# Patient Record
Sex: FEMALE | Race: WHITE | NOT HISPANIC OR LATINO | Employment: FULL TIME | URBAN - METROPOLITAN AREA
[De-identification: names, ages, dates, MRNs, and addresses within clinical notes are randomized per-mention and may not be internally consistent; named-entity substitution may affect disease eponyms.]

---

## 2017-10-10 ENCOUNTER — CLINICAL SUPPORT (OUTPATIENT)
Dept: OTHER | Facility: CLINIC | Age: 37
End: 2017-10-10
Payer: COMMERCIAL

## 2017-10-10 DIAGNOSIS — Z00.8 HEALTH EXAMINATION IN POPULATION SURVEYS: Primary | ICD-10-CM

## 2017-10-10 PROCEDURE — 82947 ASSAY GLUCOSE BLOOD QUANT: CPT | Mod: QW,S$GLB,, | Performed by: INTERNAL MEDICINE

## 2017-10-10 PROCEDURE — 99401 PREV MED CNSL INDIV APPRX 15: CPT | Mod: S$GLB,,, | Performed by: INTERNAL MEDICINE

## 2017-10-10 PROCEDURE — 80061 LIPID PANEL: CPT | Mod: QW,S$GLB,, | Performed by: INTERNAL MEDICINE

## 2017-10-11 VITALS
HEIGHT: 64 IN | WEIGHT: 193 LBS | BODY MASS INDEX: 32.95 KG/M2 | SYSTOLIC BLOOD PRESSURE: 136 MMHG | DIASTOLIC BLOOD PRESSURE: 92 MMHG

## 2017-10-11 LAB
GLUCOSE SERPL-MCNC: ABNORMAL MG/DL (ref 60–140)
POC CHOLESTEROL, HDL: 39 MG/DL (ref 40–?)
POC CHOLESTEROL, LDL: 124 MG/DL (ref ?–160)
POC CHOLESTEROL, TOTAL: 228 MG/DL (ref ?–240)
POC GLUCOSE FASTING: 189 MG/DL (ref 60–110)
POC TOTAL CHOLESTEROL / HDL RATIO: 5.85 (ref ?–6)
POC TRIGLYCERIDES: 325 MG/DL (ref ?–160)

## 2018-11-27 ENCOUNTER — OFFICE VISIT (OUTPATIENT)
Dept: OTOLARYNGOLOGY | Facility: CLINIC | Age: 38
End: 2018-11-27
Payer: COMMERCIAL

## 2018-11-27 VITALS
WEIGHT: 194.88 LBS | HEART RATE: 72 BPM | TEMPERATURE: 98 F | SYSTOLIC BLOOD PRESSURE: 132 MMHG | DIASTOLIC BLOOD PRESSURE: 76 MMHG | BODY MASS INDEX: 33.45 KG/M2

## 2018-11-27 DIAGNOSIS — H60.391 OTHER INFECTIVE ACUTE OTITIS EXTERNA OF RIGHT EAR: Primary | ICD-10-CM

## 2018-11-27 DIAGNOSIS — H72.93 TYMPANIC MEMBRANE PERFORATION, BILATERAL: ICD-10-CM

## 2018-11-27 DIAGNOSIS — H91.93 BILATERAL HEARING LOSS, UNSPECIFIED HEARING LOSS TYPE: ICD-10-CM

## 2018-11-27 PROCEDURE — 99999 PR PBB SHADOW E&M-EST. PATIENT-LVL III: CPT | Mod: PBBFAC,,, | Performed by: ORTHOPAEDIC SURGERY

## 2018-11-27 PROCEDURE — 3008F BODY MASS INDEX DOCD: CPT | Mod: CPTII,S$GLB,, | Performed by: ORTHOPAEDIC SURGERY

## 2018-11-27 PROCEDURE — 99204 OFFICE O/P NEW MOD 45 MIN: CPT | Mod: S$GLB,,, | Performed by: ORTHOPAEDIC SURGERY

## 2018-11-27 RX ORDER — CEFDINIR 300 MG/1
300 CAPSULE ORAL 2 TIMES DAILY
Qty: 20 CAPSULE | Refills: 0 | Status: SHIPPED | OUTPATIENT
Start: 2018-11-27 | End: 2018-12-07

## 2018-11-27 RX ORDER — FLUTICASONE PROPIONATE 50 MCG
2 SPRAY, SUSPENSION (ML) NASAL DAILY
COMMUNITY
End: 2018-12-27

## 2018-11-28 NOTE — PROGRESS NOTES
Subjective:       Patient ID: Duane Romero is a 38 y.o. female.    Chief Complaint: Frequent ear infections    Patient is a very pleasant 38-year-old female here to see me today for the 1st time for evaluation of her ears.  She has a long and complicated otologic history, and required 10 sets of ear tubes as a child.  She says that she eventually had a retained ear tube as well as bilateral perforations, the retained tube was removed at about 17-18 years of age.  They attempted repair of her left tympanic membrane perforation around 1997 with a postauricular approach.  However, that was not successful, and she has not had any further attempts at repair.  She has also followed with Dr. Lora at [facility] and Dr. Cortez, her last audiogram was approximately a year ago.  She also had a CT scan done at an outside facility several years ago, she does not have those reports with her now.  Currently, she has had drainage from her right ear since Sunday.  She has not been using any ear drops, and says that in fact she has not used any ear drops since childhood she had an episode where triggered severe ear pain.  Occasionally, she does have pain in both ears.  She does have hearing loss at her baseline in both ears, and has not noted any worsening of her hearing.  She denies any dizziness.  She does have tinnitus at her baseline, it has not been lower recently.  She also has a history of significant allergies, and is currently using Flonase daily.  She has had allergy testing in the past, and did reveal reactions to multiple different aeroallergens.      Review of Systems   Constitutional: Negative for chills, fatigue, fever and unexpected weight change.   HENT: Positive for congestion, ear discharge, ear pain, hearing loss and tinnitus. Negative for dental problem, facial swelling, nosebleeds, postnasal drip, rhinorrhea, sinus pressure, sneezing, sore throat, trouble swallowing and voice change.    Eyes: Negative for  redness, itching and visual disturbance.   Respiratory: Negative for cough, choking, shortness of breath and wheezing.    Cardiovascular: Negative for chest pain and palpitations.   Gastrointestinal: Negative for abdominal pain.        No reflux.   Musculoskeletal: Negative for gait problem.   Skin: Negative for rash.   Allergic/Immunologic: Positive for environmental allergies.   Neurological: Negative for dizziness, light-headedness and headaches.       Objective:      Physical Exam   Constitutional: She is oriented to person, place, and time. She appears well-developed and well-nourished. No distress.   HENT:   Head: Normocephalic and atraumatic.   Right Ear: Tympanic membrane, external ear and ear canal normal. There is drainage, swelling and tenderness.   Left Ear: External ear and ear canal normal. Tympanic membrane is perforated.   Nose: Nose normal. No mucosal edema, rhinorrhea, nasal deformity or septal deviation. No epistaxis. Right sinus exhibits no maxillary sinus tenderness and no frontal sinus tenderness. Left sinus exhibits no maxillary sinus tenderness and no frontal sinus tenderness.   Mouth/Throat: Uvula is midline, oropharynx is clear and moist and mucous membranes are normal. Mucous membranes are not pale and not dry. No dental caries. No oropharyngeal exudate or posterior oropharyngeal erythema.   Diffuse edema of the right EAC, very swollen and erythematous, unable to see the TM due to edema of EAC   Eyes: Conjunctivae, EOM and lids are normal. Pupils are equal, round, and reactive to light. Right eye exhibits no chemosis. Left eye exhibits no chemosis. Right conjunctiva is not injected. Left conjunctiva is not injected. No scleral icterus. Right eye exhibits normal extraocular motion and no nystagmus. Left eye exhibits normal extraocular motion and no nystagmus.   Neck: Trachea normal and phonation normal. No tracheal tenderness present. No tracheal deviation present. No thyroid mass and no  thyromegaly present.   Cardiovascular: Intact distal pulses.   Pulmonary/Chest: Effort normal. No stridor. No respiratory distress.   Abdominal: She exhibits no distension.   Lymphadenopathy:        Head (right side): No submental, no submandibular, no preauricular, no posterior auricular and no occipital adenopathy present.        Head (left side): No submental, no submandibular, no preauricular, no posterior auricular and no occipital adenopathy present.     She has no cervical adenopathy.   Neurological: She is alert and oriented to person, place, and time. No cranial nerve deficit.   Skin: Skin is warm and dry. No rash noted. No erythema.   Psychiatric: She has a normal mood and affect. Her behavior is normal.       Assessment:       1. Other infective acute otitis externa of right ear    2. Tympanic membrane perforation, bilateral    3. Bilateral hearing loss, unspecified hearing loss type        Plan:       1.  OE right ear:  She has a significant OE of the right ear, but cannot generally tolerate topical ear drops.  Will start on Floxin ear drops to her right ear.  Will also start on Omnicef due to difficulty with drops.   2.  TM perforation bilaterally:  Has been stable for some time, she is not interested in repair at this time.  Discussed that we do have otology available here if needed.  Will have her sign release for us to obtain her previous records from Dr. Cortez as well as [facility] regarding her CT temporal bones.  She will try and bring a disc to her return visit so we can upload to our system.  3.  Bilateral hearing loss:  I would recommend a repeat audiogram at time of return visit, obtain old audiograms as above.

## 2018-11-29 ENCOUNTER — TELEPHONE (OUTPATIENT)
Dept: OTOLARYNGOLOGY | Facility: CLINIC | Age: 38
End: 2018-11-29

## 2018-11-29 NOTE — TELEPHONE ENCOUNTER
Called and spoke with patient who was at work.  She has noted gradual improvement today but says that ear seems to be popping more today.  She is on Flonase.  She has not used ear drops today.  Continues on oral antibiotics.  Offered her an appointment to see me today to assess whether otowick placement is needed.  She declined and says she'll resume drops and call us back with any worsening symptoms.

## 2018-11-29 NOTE — TELEPHONE ENCOUNTER
Patient saw Dr. Garcia on 11/27 (Tuesday) for right sided ear infection.  Dr. Garcia placed her on Omnicef.  Patient had some Floxin drops that Dr. Garcia told her was ok to use.  She is in horrible pain in the right ear and would like to know what to do.  Can you help?  I told her Dr. Garcia was not in today.  Thanks.

## 2018-11-29 NOTE — TELEPHONE ENCOUNTER
----- Message from Liz Curran sent at 11/29/2018  7:01 AM CST -----  Contact: pt   Call pt regarding a bad ear infection and the ear drops are making it worst and need to know what to do.     [phone] (home)

## 2018-11-30 ENCOUNTER — TELEPHONE (OUTPATIENT)
Dept: OTOLARYNGOLOGY | Facility: CLINIC | Age: 38
End: 2018-11-30

## 2018-11-30 NOTE — TELEPHONE ENCOUNTER
Faxed over a release of information to [facility]. Confirmation received to both locations.

## 2018-12-19 ENCOUNTER — CLINICAL SUPPORT (OUTPATIENT)
Dept: AUDIOLOGY | Facility: CLINIC | Age: 38
End: 2018-12-19
Payer: COMMERCIAL

## 2018-12-19 ENCOUNTER — TELEPHONE (OUTPATIENT)
Dept: OTOLARYNGOLOGY | Facility: CLINIC | Age: 38
End: 2018-12-19

## 2018-12-19 ENCOUNTER — OFFICE VISIT (OUTPATIENT)
Dept: OTOLARYNGOLOGY | Facility: CLINIC | Age: 38
End: 2018-12-19
Payer: COMMERCIAL

## 2018-12-19 VITALS
DIASTOLIC BLOOD PRESSURE: 79 MMHG | TEMPERATURE: 97 F | WEIGHT: 194.88 LBS | BODY MASS INDEX: 33.27 KG/M2 | HEIGHT: 64 IN | SYSTOLIC BLOOD PRESSURE: 122 MMHG

## 2018-12-19 DIAGNOSIS — H72.93 TYMPANIC MEMBRANE PERFORATION, BILATERAL: ICD-10-CM

## 2018-12-19 DIAGNOSIS — H90.0 CONDUCTIVE HEARING LOSS, BILATERAL: ICD-10-CM

## 2018-12-19 DIAGNOSIS — H60.503 ACUTE OTITIS EXTERNA OF BOTH EARS, UNSPECIFIED TYPE: ICD-10-CM

## 2018-12-19 DIAGNOSIS — H72.93 TYMPANIC MEMBRANE PERFORATION, BILATERAL: Primary | ICD-10-CM

## 2018-12-19 DIAGNOSIS — H90.0 CONDUCTIVE HEARING LOSS OF BOTH EARS: Primary | ICD-10-CM

## 2018-12-19 PROCEDURE — 99999 PR PBB SHADOW E&M-EST. PATIENT-LVL III: CPT | Mod: PBBFAC,,, | Performed by: ORTHOPAEDIC SURGERY

## 2018-12-19 PROCEDURE — 92567 TYMPANOMETRY: CPT | Mod: S$GLB,,, | Performed by: AUDIOLOGIST-HEARING AID FITTER

## 2018-12-19 PROCEDURE — 92553 AUDIOMETRY AIR & BONE: CPT | Mod: S$GLB,,, | Performed by: AUDIOLOGIST-HEARING AID FITTER

## 2018-12-19 PROCEDURE — 99214 OFFICE O/P EST MOD 30 MIN: CPT | Mod: S$GLB,,, | Performed by: ORTHOPAEDIC SURGERY

## 2018-12-19 PROCEDURE — 3008F BODY MASS INDEX DOCD: CPT | Mod: CPTII,S$GLB,, | Performed by: ORTHOPAEDIC SURGERY

## 2018-12-19 PROCEDURE — 92555 SPEECH THRESHOLD AUDIOMETRY: CPT | Mod: S$GLB,,, | Performed by: AUDIOLOGIST-HEARING AID FITTER

## 2018-12-19 NOTE — PROGRESS NOTES
Subjective:       Patient ID: Duane Romero is a 38 y.o. female.    Chief Complaint: Follow-up    Patient is a very pleasant 38-year-old female here to see me today in followup for evaluation of her ears.  She has a long and complicated otologic history, and required 10 sets of ear tubes as a child.  She says that she eventually had a retained ear tube as well as bilateral perforations, the retained tube was removed at about 17-18 years of age.  They attempted repair of her left tympanic membrane perforation around 1997 with a postauricular approach.  However, that was not successful, and she has not had any further attempts at repair.  She has also followed with Dr. Lora at [facility] and Dr. Cortez, her last audiogram was approximately a year ago and then had one today.  She also had a CT scan done at an outside facility several years ago, she does not have those reports with her now.  Occasionally, she does have pain in both ears.  She does have hearing loss at her baseline in both ears, and has not noted any worsening of her hearing.  She denies any dizziness.  She does have tinnitus at her baseline.   She also has a history of significant allergies, and is currently using Flonase daily.  She has had allergy testing in the past, and did reveal reactions to multiple different aeroallergens.  Since her last visit, the pain and drainage has resolved her right ear.  She has been on Floxin ear drops as well as oral Omnicef.  She feels that her hearing is worse now than it was prior to her ear infection.      Review of Systems   Constitutional: Negative for chills, fatigue, fever and unexpected weight change.   HENT: Positive for congestion, hearing loss and tinnitus. Negative for dental problem, ear discharge, ear pain, facial swelling, nosebleeds, postnasal drip, rhinorrhea, sinus pressure, sneezing, sore throat, trouble swallowing and voice change.    Eyes: Negative for redness, itching and visual disturbance.    Respiratory: Negative for cough, choking, shortness of breath and wheezing.    Cardiovascular: Negative for chest pain and palpitations.   Gastrointestinal: Negative for abdominal pain.        No reflux.   Musculoskeletal: Negative for gait problem.   Skin: Negative for rash.   Allergic/Immunologic: Positive for environmental allergies.   Neurological: Negative for dizziness, light-headedness and headaches.       Objective:      Physical Exam   Constitutional: She is oriented to person, place, and time. She appears well-developed and well-nourished. No distress.   HENT:   Head: Normocephalic and atraumatic.   Right Ear: External ear and ear canal normal. No drainage, swelling or tenderness. Tympanic membrane is perforated (small, anterior inferior quadrant).   Left Ear: External ear and ear canal normal. Tympanic membrane is perforated (large, anterior).   Nose: Nose normal. No mucosal edema, rhinorrhea, nasal deformity or septal deviation. No epistaxis. Right sinus exhibits no maxillary sinus tenderness and no frontal sinus tenderness. Left sinus exhibits no maxillary sinus tenderness and no frontal sinus tenderness.   Mouth/Throat: Uvula is midline, oropharynx is clear and moist and mucous membranes are normal. Mucous membranes are not pale and not dry. No dental caries. No oropharyngeal exudate or posterior oropharyngeal erythema.   Diffuse edema of the right EAC, very swollen and erythematous, unable to see the TM due to edema of EAC   Eyes: Conjunctivae, EOM and lids are normal. Pupils are equal, round, and reactive to light. Right eye exhibits no chemosis. Left eye exhibits no chemosis. Right conjunctiva is not injected. Left conjunctiva is not injected. No scleral icterus. Right eye exhibits normal extraocular motion and no nystagmus. Left eye exhibits normal extraocular motion and no nystagmus.   Neck: Trachea normal and phonation normal. No tracheal tenderness present. No tracheal deviation present. No  thyroid mass and no thyromegaly present.   Cardiovascular: Intact distal pulses.   Pulmonary/Chest: Effort normal. No stridor. No respiratory distress.   Abdominal: She exhibits no distension.   Lymphadenopathy:        Head (right side): No submental, no submandibular, no preauricular, no posterior auricular and no occipital adenopathy present.        Head (left side): No submental, no submandibular, no preauricular, no posterior auricular and no occipital adenopathy present.     She has no cervical adenopathy.   Neurological: She is alert and oriented to person, place, and time. No cranial nerve deficit.   Skin: Skin is warm and dry. No rash noted. No erythema.   Psychiatric: She has a normal mood and affect. Her behavior is normal.       Audiology:  Bilateral conductive hearing loss with type B tympanograms      Assessment:       1. Tympanic membrane perforation, bilateral    2. Conductive hearing loss, bilateral        Plan:       1.  Bilateral tympanic membrane perforations:  Reviewed her audiogram together in detail.  Unfortunately, we have not yet received her outside records so I cannot tell her if her hearing has in fact worsened in her ear since her ear infection.  However, we did discussed different options available to her at this point.  We discussed that she is a good candidate for a BAHA if she is interested.  She did have very good questions regarding this procedure, I would like to meet with audiology for demonstration.  We also discussed that she would be a candidate for possible middle ear exploration and ossicular chain reconstruction.  She had previously discussed this with Dr. Lora, but was concerned regarding complications, which is certainly reasonable.  She would like to meet with Dr. Kelley to discuss all of her options.  Will schedule visit with audiology and Dr. Kelley for consultation and BAHA demonstration.   She will bring her records with her to that visit.  2.  Bilateral conductive  hearing loss:  As above.

## 2018-12-27 ENCOUNTER — OFFICE VISIT (OUTPATIENT)
Dept: OTOLARYNGOLOGY | Facility: CLINIC | Age: 38
End: 2018-12-27
Payer: COMMERCIAL

## 2018-12-27 VITALS — WEIGHT: 193.13 LBS | BODY MASS INDEX: 32.97 KG/M2 | HEIGHT: 64 IN

## 2018-12-27 DIAGNOSIS — H92.02 LEFT EAR PAIN: ICD-10-CM

## 2018-12-27 DIAGNOSIS — H90.0 CONDUCTIVE HEARING LOSS, BILATERAL: ICD-10-CM

## 2018-12-27 DIAGNOSIS — H72.93 TYMPANIC MEMBRANE PERFORATION, BILATERAL: Primary | ICD-10-CM

## 2018-12-27 PROCEDURE — 99999 PR PBB SHADOW E&M-EST. PATIENT-LVL II: CPT | Mod: PBBFAC,,, | Performed by: PHYSICIAN ASSISTANT

## 2018-12-27 PROCEDURE — 99213 OFFICE O/P EST LOW 20 MIN: CPT | Mod: S$GLB,,, | Performed by: PHYSICIAN ASSISTANT

## 2018-12-27 PROCEDURE — 3008F BODY MASS INDEX DOCD: CPT | Mod: CPTII,S$GLB,, | Performed by: PHYSICIAN ASSISTANT

## 2018-12-27 RX ORDER — OFLOXACIN 3 MG/ML
3 SOLUTION AURICULAR (OTIC) 2 TIMES DAILY
Qty: 5 ML | Refills: 0 | Status: SHIPPED | OUTPATIENT
Start: 2018-12-27 | End: 2019-01-06

## 2018-12-27 RX ORDER — FLUTICASONE PROPIONATE 50 MCG
2 SPRAY, SUSPENSION (ML) NASAL DAILY
Qty: 1 BOTTLE | Refills: 12 | Status: SHIPPED | OUTPATIENT
Start: 2018-12-27 | End: 2019-01-31

## 2018-12-27 NOTE — PROGRESS NOTES
Subjective:       Patient ID: Duane Romero is a 38 y.o. female.    Chief Complaint: Ear Drainage (right) and Otalgia (left)    Patient is a very pleasant 38-year-old female here to see me today for the first time in followup for her ears.  She was last here on 12/19/18 with Dr. Garcia.  She reports left ear pain 2 nights ago; seems better today.  She's noticed her ears are popping.  No left ear drainage but says her right ear continues to drain occasionally since her last visit (< 2 weeks ago) but denies right otalgia.  She had just completed Floxin ear drops as well as oral Omnicef at her last visit.  No recent fever.  No recent change in her hearing.  She also has a history of significant allergies.  She has had allergy testing in the past, and did reveal reactions to multiple different aeroallergens.  She has been out of Flonase for past 7-10 days.    She has a long and complicated otologic history, and required 10 sets of ear tubes as a child.  She says that she eventually had a retained ear tube as well as bilateral perforations.  The retained tube was removed at about 17-18 years of age.  They attempted repair of her left tympanic membrane perforation around 1997 with a postauricular approach.  However, that was not successful, and she has not had any further attempts at repair.  She has also followed with Dr. Lora at [facility] and Dr. Cortez.  She had an audiogram at her last visit.  She also had a CT scan done at an outside facility several years ago, she does not have those reports with her now.  Occasionally, she does have pain in both ears.  She does have hearing loss at her baseline in both ears, and has not noted any worsening of her hearing.  She denies any dizziness.  She does have tinnitus at her baseline.        Review of Systems   Constitutional: Negative for chills, fatigue, fever and unexpected weight change.   HENT: Positive for congestion, hearing loss and tinnitus. Negative for dental  problem, ear discharge, ear pain, facial swelling, nosebleeds, postnasal drip, rhinorrhea, sinus pressure, sneezing, sore throat, trouble swallowing and voice change.    Eyes: Negative for redness, itching and visual disturbance.   Respiratory: Negative for cough, choking, shortness of breath and wheezing.    Cardiovascular: Negative for chest pain and palpitations.   Gastrointestinal: Negative for abdominal pain.        No reflux.   Musculoskeletal: Negative for gait problem.   Skin: Negative for rash.   Allergic/Immunologic: Positive for environmental allergies.   Neurological: Negative for dizziness, light-headedness and headaches.       Objective:      Physical Exam   Constitutional: She is oriented to person, place, and time. She appears well-developed and well-nourished. She is cooperative. No distress.   HENT:   Head: Normocephalic and atraumatic.   Right Ear: External ear and ear canal normal. No drainage, swelling (minimal cracked skin near EAC meatus) or tenderness. Tympanic membrane is scarred. No middle ear effusion.   Left Ear: External ear and ear canal normal. No drainage, swelling or tenderness. Tympanic membrane is perforated (dry middle ear mucosa).   Nose: Nose normal. No mucosal edema, rhinorrhea, nasal deformity or septal deviation. No epistaxis. Right sinus exhibits no maxillary sinus tenderness and no frontal sinus tenderness. Left sinus exhibits no maxillary sinus tenderness and no frontal sinus tenderness.   Mouth/Throat: Uvula is midline, oropharynx is clear and moist and mucous membranes are normal. Mucous membranes are not pale and not dry. No trismus in the jaw. No uvula swelling or dental caries. No oropharyngeal exudate or posterior oropharyngeal erythema.   Eyes: Conjunctivae, EOM and lids are normal. Pupils are equal, round, and reactive to light. Right eye exhibits no chemosis. Left eye exhibits no chemosis. Right conjunctiva is not injected. Left conjunctiva is not injected. No  scleral icterus. Right eye exhibits normal extraocular motion and no nystagmus. Left eye exhibits normal extraocular motion and no nystagmus.   Neck: Trachea normal and phonation normal. No tracheal tenderness present. No tracheal deviation present. No thyroid mass and no thyromegaly present.   Cardiovascular: Intact distal pulses.   Pulmonary/Chest: Effort normal. No stridor. No respiratory distress.   Abdominal: She exhibits no distension.   Lymphadenopathy:        Head (right side): No submental, no submandibular, no preauricular, no posterior auricular and no occipital adenopathy present.        Head (left side): No submental, no submandibular, no preauricular, no posterior auricular and no occipital adenopathy present.     She has no cervical adenopathy.   Neurological: She is alert and oriented to person, place, and time. No cranial nerve deficit.   Skin: Skin is warm and dry. No rash noted. No erythema.   Psychiatric: She has a normal mood and affect. Her behavior is normal.       Assessment:       1. Tympanic membrane perforation, bilateral    2. Conductive hearing loss, bilateral    3. Left ear pain        Plan:         Discussed with patient that she has a large LEFT TM perforation but I see no signs of infection or drainage to culture today.  I recommend she resume Flonase.  Instructed her to RTC with any worsening symptoms.  As for her bilateral tympanic membrane perforations and CHL AU, I agree with referral to Dr. Kelley to discuss treatment options (BAHA if she is interested; candidate for possible middle ear exploration and ossicular chain reconstruction).  She had previously discussed this with Dr. Lora, but was concerned regarding complications, which is certainly reasonable.  She is already scheduled to meet with Dr. Kelley to discuss all of her options (visit with audiology and Dr. Kelley for consultation and BAHA demonstration).   She will bring her records with her to that visit.

## 2019-01-31 ENCOUNTER — OFFICE VISIT (OUTPATIENT)
Dept: OTOLARYNGOLOGY | Facility: CLINIC | Age: 39
End: 2019-01-31
Payer: COMMERCIAL

## 2019-01-31 ENCOUNTER — CLINICAL SUPPORT (OUTPATIENT)
Dept: AUDIOLOGY | Facility: CLINIC | Age: 39
End: 2019-01-31
Payer: COMMERCIAL

## 2019-01-31 VITALS
WEIGHT: 191.81 LBS | BODY MASS INDEX: 33.44 KG/M2 | HEART RATE: 64 BPM | SYSTOLIC BLOOD PRESSURE: 119 MMHG | DIASTOLIC BLOOD PRESSURE: 74 MMHG

## 2019-01-31 DIAGNOSIS — Z71.89 HEARING AID CONSULTATION: Primary | ICD-10-CM

## 2019-01-31 DIAGNOSIS — H90.0 CONDUCTIVE HEARING LOSS, BILATERAL: ICD-10-CM

## 2019-01-31 DIAGNOSIS — H72.93 TYMPANIC MEMBRANE PERFORATION, BILATERAL: Primary | ICD-10-CM

## 2019-01-31 PROCEDURE — 3008F BODY MASS INDEX DOCD: CPT | Mod: CPTII,S$GLB,, | Performed by: OTOLARYNGOLOGY

## 2019-01-31 PROCEDURE — 99999 PR PBB SHADOW E&M-EST. PATIENT-LVL II: CPT | Mod: PBBFAC,,, | Performed by: OTOLARYNGOLOGY

## 2019-01-31 PROCEDURE — 3008F PR BODY MASS INDEX (BMI) DOCUMENTED: ICD-10-PCS | Mod: CPTII,S$GLB,, | Performed by: OTOLARYNGOLOGY

## 2019-01-31 PROCEDURE — 99213 PR OFFICE/OUTPT VISIT, EST, LEVL III, 20-29 MIN: ICD-10-PCS | Mod: S$GLB,,, | Performed by: OTOLARYNGOLOGY

## 2019-01-31 PROCEDURE — 99213 OFFICE O/P EST LOW 20 MIN: CPT | Mod: S$GLB,,, | Performed by: OTOLARYNGOLOGY

## 2019-01-31 PROCEDURE — 99999 PR PBB SHADOW E&M-EST. PATIENT-LVL II: ICD-10-PCS | Mod: PBBFAC,,, | Performed by: OTOLARYNGOLOGY

## 2019-01-31 NOTE — PROGRESS NOTES
Duane Romero was seen 01/31/2019 for a Bone Anchored Hearing Aid evaluation to determine candidacy.  Patient was tested in the sound pires in the sound field using pulsed FM signal to obtain aided audiogram with BAHA programmed for her LEFT side.     Her word recognition score with BAHA demo was 100% at 55dBHL and 100% at a + 5 S/N ratio.    Patient subjectively reported noticeable benefit with the BAHA demo for speech clarity, quality and lateralization.  She would like to proceed with the BAHA surgery bilaterally pending medical clearance and insurance coverage.  The Audiological BAHA purchase agreement was discussed and fitting/programming fee agreed.    The following may be ordered:  BAHA 5 (Connect vs. Attract TBD with ENT) bilateral in blonde  Mini ojhn and TV streamer    Patient will follow-up with  for review and consult.    Recommendations include:    1.  ENT followup  2.  Wear hearing protective devices around loud noise  3.  Annual audiograms

## 2019-01-31 NOTE — LETTER
February 1, 2019      Brittany Garcia MD  [address] [place] [address] [address] [place] [id number]           [facility]  [address] [facility]  [place] [address]  Phone: [phone]  Fax: [phone]          Patient: Duane Romero   MR Number: [id number]   YOB: 1980   Date of Visit: 1/31/2019       Dear Dr. Brittany Garcia:    Thank you for referring Duane Romero to me for evaluation. Attached you will find relevant portions of my assessment and plan of care.    If you have questions, please do not hesitate to call me. I look forward to following Duane Romero along with you.    Sincerely,    Jeff Kelley MD    Enclosure  CC:  No Recipients    If you would like to receive this communication electronically, please contact [email or web address] or [phone] to request more information on [employer] access.    For providers and/or their staff who would like to refer a patient to [facility], please contact us through our one-stop-shop provider referral line, [facility], at [phone].    If you feel you have received this communication in error or would no longer like to receive these types of communications, please e-mail [email or web address]

## 2019-02-01 NOTE — PROGRESS NOTES
Subjective:       Patient ID: Duane Romero is a 38 y.o. female.    Chief Complaint: Hearing Loss    HPI     Duane Romero is a 38 y.o. female referred by Dr. Brittany Garcia for bilateral tympanic membrane perforations and conductive hearing loss.  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  Has had bilateral tympanoplasty with persistent perforation.  Has associated moderate constant bilateral hearing loss. Denies otorrhea currently, but states get ear infections a couple times a year.    Review of Systems   Constitutional: Negative for chills and fever.   HENT: Negative for sore throat and trouble swallowing.    Respiratory: Negative for apnea and chest tightness.    Cardiovascular: Negative for chest pain.       Objective:      Physical Exam   Constitutional: She appears well-developed and well-nourished.   HENT:   Head: Normocephalic and atraumatic.   Right Ear: External ear and ear canal normal. Tympanic membrane is perforated.   Left Ear: External ear and ear canal normal. Tympanic membrane is perforated.   Ears:    Nose: Nose normal.   Mouth/Throat: Uvula is midline, oropharynx is clear and moist and mucous membranes are normal.   Neck: Normal range of motion. No thyroid mass present.       Data:      Audiogram tracings independently reviewed and discussed with patient shows bilateral CHL with very large ABG  Assessment:       1. Tympanic membrane perforation, bilateral    2. Conductive hearing loss, bilateral          This is a 38 yr old with history of CSOM.  Currently she has bilateral anterior perforations and severe CHL.  Hearing rehabilation options include tympanoplasty with possible OCR, conventional HA or a BAHA.  We had an extensive discussion on the pros and cons of each option.  Patient is more interested in BAHA at this time, however she would prefer to have surgery in [place] if it can be done here.  Plan:    Patient to call to schedule surgery   If patient opts for  DEBRA she would like it to be done in [place].  I explained that this is fine, but she would need to schedule with Dr. Arellano at this time, as I am not operating in [place] currently.

## 2019-02-01 NOTE — PROGRESS NOTES
Referring Provider:Dr. Radha Zepeda Heather was seen 12/19/2018 for an audiological evaluation.  Patient was seen for a 1 month follow-up today due to acute otitis externa and bilateral tympanic membrane perforations. She has a long and complicated otologic history, and required 10 sets of ear tubes as a child.  She says that she eventually had a retained ear tube as well as bilateral perforations, the retained tube was removed at about 17-16 years of age.  They attempted repair of her left tympanic membrane perforation around 1997 with a postauricular approach.  However, that was not successful, and she has not had any further attempts at repair.  She has also followed with Dr. Lora at [facility] and Dr. Cortez, her last audiogram was approximately a year ago.    Results reveal a moderate rising to mild conductive hearing loss 250-8000 Hz for the right ear, and a moderately severe rising to mild conductive hearing loss 250-8000 Hz for the left ear.   Speech Reception Thresholds were  40 dBHL for the right ear and 50 dBHL for the left ear.   Tympanograms were Type B large volume, abnormal for the right ear and Type B, abnormal for the left ear.    Patient was counseled on the above findings.    Recommendations:  1. ENT          
5

## 2019-10-23 ENCOUNTER — PATIENT OUTREACH (OUTPATIENT)
Dept: ADMINISTRATIVE | Facility: HOSPITAL | Age: 39
End: 2019-10-23

## 2019-11-20 ENCOUNTER — OFFICE VISIT (OUTPATIENT)
Dept: INTERNAL MEDICINE | Facility: CLINIC | Age: 39
End: 2019-11-20
Payer: COMMERCIAL

## 2019-11-20 VITALS
SYSTOLIC BLOOD PRESSURE: 138 MMHG | TEMPERATURE: 98 F | BODY MASS INDEX: 32.16 KG/M2 | OXYGEN SATURATION: 98 % | DIASTOLIC BLOOD PRESSURE: 66 MMHG | HEIGHT: 64 IN | WEIGHT: 188.38 LBS | HEART RATE: 84 BPM

## 2019-11-20 DIAGNOSIS — E66.9 OBESITY (BMI 30.0-34.9): ICD-10-CM

## 2019-11-20 DIAGNOSIS — Z76.89 ENCOUNTER TO ESTABLISH CARE: Primary | ICD-10-CM

## 2019-11-20 DIAGNOSIS — R00.2 HEART PALPITATIONS: ICD-10-CM

## 2019-11-20 PROBLEM — E66.811 OBESITY (BMI 30.0-34.9): Status: ACTIVE | Noted: 2019-11-20

## 2019-11-20 PROCEDURE — 3008F BODY MASS INDEX DOCD: CPT | Mod: CPTII,S$GLB,, | Performed by: INTERNAL MEDICINE

## 2019-11-20 PROCEDURE — 3008F PR BODY MASS INDEX (BMI) DOCUMENTED: ICD-10-PCS | Mod: CPTII,S$GLB,, | Performed by: INTERNAL MEDICINE

## 2019-11-20 PROCEDURE — 99204 OFFICE O/P NEW MOD 45 MIN: CPT | Mod: S$GLB,,, | Performed by: INTERNAL MEDICINE

## 2019-11-20 PROCEDURE — 99204 PR OFFICE/OUTPT VISIT, NEW, LEVL IV, 45-59 MIN: ICD-10-PCS | Mod: S$GLB,,, | Performed by: INTERNAL MEDICINE

## 2019-11-20 PROCEDURE — 99999 PR PBB SHADOW E&M-EST. PATIENT-LVL III: ICD-10-PCS | Mod: PBBFAC,,, | Performed by: INTERNAL MEDICINE

## 2019-11-20 PROCEDURE — 99999 PR PBB SHADOW E&M-EST. PATIENT-LVL III: CPT | Mod: PBBFAC,,, | Performed by: INTERNAL MEDICINE

## 2019-11-20 NOTE — PROGRESS NOTES
Subjective:      Patient ID: Duane Romero is a 39 y.o. female.    Chief Complaint: [facility]      MsFidelina Romero is a patient of Peter House MD, who presents to [facility] primary care.    HPI    She reports wanting to lose weight.     She reports feeling episodes of 1 heart palpitation 0-3 times per day over the past 2-3 weeks, which causes her to take a deep breath. No chest pain or shortness of breath or dizziness or headache or blurry vision or nausea. She endorses having diaphoresis on some nights, which she attributes to early menopause or with working out. She endorses increased work-related stress.       Past Medical History:   Diagnosis Date    Allergy     Otitis media     Prediabetes     Sinusitis      Past Surgical History:   Procedure Laterality Date    ADENOIDECTOMY       SECTION      CYST REMOVAL Right     Under right arm    DILATION AND CURETTAGE OF UTERUS      TONSILLECTOMY      TYMPANOPLASTY      TYMPANOSTOMY TUBE PLACEMENT       Social History     Socioeconomic History    Marital status:      Spouse name: Not on file    Number of children: Not on file    Years of education: Not on file    Highest education level: Not on file   Occupational History    Not on file   Social Needs    Financial resource strain: Not on file    Food insecurity:     Worry: Not on file     Inability: Not on file    Transportation needs:     Medical: Not on file     Non-medical: Not on file   Tobacco Use    Smoking status: Never Smoker    Smokeless tobacco: Never Used   Substance and Sexual Activity    Alcohol use: Yes    Drug use: Not on file    Sexual activity: Not on file   Lifestyle    Physical activity:     Days per week: Not on file     Minutes per session: Not on file    Stress: Not on file   Relationships    Social connections:     Talks on phone: Not on file     Gets together: Not on file     Attends Taoism service: Not on file     Active  "member of club or organization: Not on file     Attends meetings of clubs or organizations: Not on file     Relationship status: Not on file   Other Topics Concern    Not on file   Social History Narrative    Patient goal(s): Weight of 130 lbs    Motivation: Stay healthy (prevent dz/need for rx) to enjoy outdoor activities with family or by herself    Breakfast: 1-2 sausages & 1-2 eggs with 1 slice bread; water & coffee w/ Stevia & sugar-free creamer    Lunch: Boneless chicken wings & 1 thin slice of cheesecake or 1 turkey sandwich on white bread, 1 slice of American cheese; water    Dinner: Hamburger (90:10 or 80:20 meat) with cheese & bread or chicken; water or wine 5.5 oz    Snacks: Apples or 1-2 small bag of chips daily (160 jamaal)    Eating out: 3-4x/wk    Water (oz/day): 102 oz/d    Physical Activity: 35-50 min/d of resistance/aerobic & 4-5d/wk     Strategies: Lose it radha (smaller portions; lower carb)     Goals    None       Family History   Problem Relation Age of Onset    Cancer Maternal Aunt         Skin cancer    Thyroid disease Maternal Grandmother     Hyperlipidemia Mother     Diabetes Father         DM2 insulin pump & oral    Hypertension Father     Obesity Father     Migraines Neg Hx     Asthma Neg Hx        Current Outpatient Medications:     multivitamin,tx-minerals (VITAMINS AND MINERALS) Tab, Take by mouth once daily., Disp: , Rfl:     Review of patient's allergies indicates:   Allergen Reactions    Pcn [penicillins] Itching     Injection only.  Can tolerate oral form       Review of Systems   All remaining systems negative    Objective:     /66 (BP Location: Right arm, Patient Position: Sitting)   Pulse 84   Temp 98.3 °F (36.8 °C)   Ht 5' 3.5" (1.613 m)   Wt 85.5 kg (188 lb 6.1 oz)   LMP 10/11/2019   SpO2 98%   BMI 32.85 kg/m²     Physical Exam  GEN: A&O fully, NAD  PSYC: Normal affect  CV: RRR, no M/G/R  PULM: CTA bilaterally, no wheezes, rales, crackles   EXT: No C/C/E, " normal DP pulses bilaterally      LABS:  Lab Results   Component Value Date    CHOL 228 10/11/2017       Assessment:      1. Encounter to establish care    2. Obesity (BMI 30.0-34.9): I encouraged the following permanent lifestyle modifications, particularly gradually and systematically increasing physical activity (5-90 min/episode, 3-5 times/week), water (1/2 of body weight in ounces) and avoiding potatoes, sugar sweetened beverages, red/processed meats (including chicken), fast food, grains (rice/bread/pasta); and increasing yogurt, whole fruits, unsalted nuts to 3-5 servings/day, and daily weight logging; non-starchy vegetables, cooked beans, and un-fried seafood have weak effects on weight.        Plan:   Encounter to establish care  -     CBC auto differential; Future; Expected date: 11/20/2019  -     Comprehensive metabolic panel; Future; Expected date: 11/20/2019  -     Lipid panel; Future; Expected date: 11/20/2019  -     Vitamin D; Future; Expected date: 11/20/2019    Obesity (BMI 30.0-34.9)      Follow up in about 3 months (around 2/20/2020), or if symptoms worsen or fail to improve, for FU on pre-diabetes.      I spent >45 minutes of time with patient 50% or more of which was discussing labs and plans of care.

## 2019-11-22 DIAGNOSIS — Z12.39 BREAST CANCER SCREENING: ICD-10-CM

## 2019-11-25 DIAGNOSIS — Z76.89 ESTABLISHING CARE WITH NEW DOCTOR, ENCOUNTER FOR: Primary | ICD-10-CM

## 2019-11-26 ENCOUNTER — OFFICE VISIT (OUTPATIENT)
Dept: CARDIOLOGY | Facility: CLINIC | Age: 39
End: 2019-11-26
Payer: COMMERCIAL

## 2019-11-26 ENCOUNTER — CLINICAL SUPPORT (OUTPATIENT)
Dept: CARDIOLOGY | Facility: CLINIC | Age: 39
End: 2019-11-26
Payer: COMMERCIAL

## 2019-11-26 VITALS
WEIGHT: 185 LBS | SYSTOLIC BLOOD PRESSURE: 120 MMHG | DIASTOLIC BLOOD PRESSURE: 70 MMHG | HEIGHT: 63 IN | BODY MASS INDEX: 32.78 KG/M2 | HEART RATE: 74 BPM

## 2019-11-26 DIAGNOSIS — Z76.89 ESTABLISHING CARE WITH NEW DOCTOR, ENCOUNTER FOR: ICD-10-CM

## 2019-11-26 DIAGNOSIS — R06.09 DOE (DYSPNEA ON EXERTION): ICD-10-CM

## 2019-11-26 DIAGNOSIS — E88.810 METABOLIC SYNDROME: ICD-10-CM

## 2019-11-26 DIAGNOSIS — R00.2 PALPITATIONS: ICD-10-CM

## 2019-11-26 DIAGNOSIS — E88.819 INSULIN RESISTANCE: ICD-10-CM

## 2019-11-26 PROCEDURE — 93010 EKG 12-LEAD: ICD-10-PCS | Mod: S$GLB,,, | Performed by: INTERNAL MEDICINE

## 2019-11-26 PROCEDURE — 99999 PR PBB SHADOW E&M-EST. PATIENT-LVL III: CPT | Mod: PBBFAC,,, | Performed by: NUCLEAR MEDICINE

## 2019-11-26 PROCEDURE — 3008F PR BODY MASS INDEX (BMI) DOCUMENTED: ICD-10-PCS | Mod: CPTII,S$GLB,, | Performed by: NUCLEAR MEDICINE

## 2019-11-26 PROCEDURE — 3008F BODY MASS INDEX DOCD: CPT | Mod: CPTII,S$GLB,, | Performed by: NUCLEAR MEDICINE

## 2019-11-26 PROCEDURE — 93005 ELECTROCARDIOGRAM TRACING: CPT | Mod: S$GLB,,, | Performed by: NUCLEAR MEDICINE

## 2019-11-26 PROCEDURE — 99204 PR OFFICE/OUTPT VISIT, NEW, LEVL IV, 45-59 MIN: ICD-10-PCS | Mod: S$GLB,,, | Performed by: NUCLEAR MEDICINE

## 2019-11-26 PROCEDURE — 99999 PR PBB SHADOW E&M-EST. PATIENT-LVL III: ICD-10-PCS | Mod: PBBFAC,,, | Performed by: NUCLEAR MEDICINE

## 2019-11-26 PROCEDURE — 99204 OFFICE O/P NEW MOD 45 MIN: CPT | Mod: S$GLB,,, | Performed by: NUCLEAR MEDICINE

## 2019-11-26 PROCEDURE — 93005 EKG 12-LEAD: ICD-10-PCS | Mod: S$GLB,,, | Performed by: NUCLEAR MEDICINE

## 2019-11-26 PROCEDURE — 93010 ELECTROCARDIOGRAM REPORT: CPT | Mod: S$GLB,,, | Performed by: INTERNAL MEDICINE

## 2019-11-26 NOTE — LETTER
November 26, 2019      Pteer House MD  [address]  [address]  [place] [address]           [facility]  [address] [address]  Phone: [phone]  Fax: [phone]          Patient: Duane Romero   MR Number: [id number]   YOB: 1980   Date of Visit: 11/26/2019       Dear Dr. Peter House:    Thank you for referring Duane Romero to me for evaluation. Attached you will find relevant portions of my assessment and plan of care.    If you have questions, please do not hesitate to call me. I look forward to following Duane Romero along with you.    Sincerely,    Kevin Good MD    Enclosure  CC:  No Recipients    If you would like to receive this communication electronically, please contact [email or web address] or [phone] to request more information on [facility] access.    For providers and/or their staff who would like to refer a patient to [facility], please contact us through our one-stop-shop provider referral line, [facility], at [phone].    If you feel you have received this communication in error or would no longer like to receive these types of communications, please e-mail [email or web address]

## 2019-11-26 NOTE — PROGRESS NOTES
Subjective:   Patient ID:  Duane Romero is a 39 y.o. female who presents for evaluation of Palpitations and Shortness of Breath      HPI SELF REFERRED FOR CARD EVALUATION OF WILLIS AND PALPITATIONS  3 MONTHS HX OF WILLIS PARTICULARLY DURING GYM EXERCISE.  NO WILLIS WITH REGULAR DAILY ACTIVITIES AND MODERATE ACTIVITIES  NO DYSPNEA AT REST. NO ORTHOPNEA OR PND  ABOUT 2-3 WEEKS HX OF INTERMITTENT , EPISODES OF PALPITATIONS- LIKE THUMP, SKIP BEATS. VERY BRIEF DURATION.  NO ASSOCIATED SYMPTOMS   NO HX OF CARD ARRHYTHMIAS. NO ABNORMAL ECG.  NO CARDIOMEGALY, NO HEART MURMUR. NO CONGENITAL HD  NO HX OF CAD- ANGINA OR ACS  NO HX OF HF, CARDIOMEGALY OR MYOPERICARDITIS  NO FM HX OF SCD. CARD ARRHYTHMIAS, SYNCOPE OR DEVICE PLACEMENT  NO HX OF DVT , PE OR PHT  NO HX OF CKD. HTN, NO THYROID DISEASE. NO SMOKING  HX OF METABOLIC SYND- ABDOMINAL OBESITY. LOW HDL, HIGH TG, INSULIN RESISTANCE  ECG TODAY- SR 70, NORMAL    Family History   Problem Relation Age of Onset    Cancer Maternal Aunt         Skin cancer    Thyroid disease Maternal Grandmother     Hyperlipidemia Mother     Diabetes Father         DM2 insulin pump & oral    Hypertension Father     Obesity Father     Migraines Neg Hx     Asthma Neg Hx      Past Medical History:   Diagnosis Date    Allergy     Asthma     Hyperlipidemia     Otitis media     Prediabetes     Sinusitis     Type A insulin resistance      Social History     Socioeconomic History    Marital status:      Spouse name: Not on file    Number of children: Not on file    Years of education: Not on file    Highest education level: Not on file   Occupational History    Not on file   Social Needs    Financial resource strain: Not on file    Food insecurity:     Worry: Not on file     Inability: Not on file    Transportation needs:     Medical: Not on file     Non-medical: Not on file   Tobacco Use    Smoking status: Never Smoker    Smokeless tobacco: Never Used   Substance and  Sexual Activity    Alcohol use: Yes    Drug use: Never    Sexual activity: Not on file   Lifestyle    Physical activity:     Days per week: Not on file     Minutes per session: Not on file    Stress: Not on file   Relationships    Social connections:     Talks on phone: Not on file     Gets together: Not on file     Attends Pentecostal service: Not on file     Active member of club or organization: Not on file     Attends meetings of clubs or organizations: Not on file     Relationship status: Not on file   Other Topics Concern    Not on file   Social History Narrative    Patient goal(s): Weight of 130 lbs    Motivation: Stay healthy (prevent dz/need for rx) to enjoy outdoor activities with family or by herself    Breakfast: 1-2 sausages & 1-2 eggs with 1 slice bread; water & coffee w/ Stevia & sugar-free creamer    Lunch: Boneless chicken wings & 1 thin slice of cheesecake or 1 turkey sandwich on white bread, 1 slice of American cheese; water    Dinner: Hamburger (90:10 or 80:20 meat) with cheese & bread or chicken; water or wine 5.5 oz    Snacks: Apples or 1-2 small bag of chips daily (160 jamaal)    Eating out: 3-4x/wk    Water (oz/day): 102 oz/d    Physical Activity: 35-50 min/d of resistance/aerobic & 4-5d/wk     Strategies: Lose it radha (smaller portions; lower carb)     Current Outpatient Medications on File Prior to Visit   Medication Sig Dispense Refill    multivitamin,tx-minerals (VITAMINS AND MINERALS) Tab Take by mouth once daily.       No current facility-administered medications on file prior to visit.      Review of patient's allergies indicates:   Allergen Reactions    Pcn [penicillins] Itching     Injection only.  Can tolerate oral form       Review of Systems   Constitution: Negative for chills, decreased appetite, fever, malaise/fatigue, night sweats, weight gain and weight loss.   HENT: Negative for nosebleeds.    Eyes: Negative for blurred vision, double vision and visual disturbance.    Cardiovascular: Positive for dyspnea on exertion and palpitations. Negative for chest pain, claudication, cyanosis, irregular heartbeat, leg swelling, near-syncope, orthopnea, paroxysmal nocturnal dyspnea and syncope.   Respiratory: Negative for cough, hemoptysis, shortness of breath, sleep disturbances due to breathing, snoring and wheezing.    Endocrine: Negative for cold intolerance, heat intolerance, polydipsia and polyuria.   Hematologic/Lymphatic: Does not bruise/bleed easily.   Skin: Negative for flushing and rash.   Musculoskeletal: Negative for gout, joint pain, joint swelling, muscle weakness and myalgias.   Gastrointestinal: Negative for abdominal pain, anorexia, change in bowel habit, constipation, diarrhea, dysphagia, heartburn, hematemesis, hematochezia, jaundice, melena, nausea and vomiting.   Genitourinary: Negative for decreased libido, dysuria, frequency, hematuria, nocturia and urgency.   Neurological: Negative for difficulty with concentration, excessive daytime sleepiness, dizziness, focal weakness, headaches, light-headedness, numbness, seizures, sensory change, tremors, vertigo and weakness.   Psychiatric/Behavioral: Negative for depression and memory loss. The patient does not have insomnia and is not nervous/anxious.    Allergic/Immunologic: Negative for environmental allergies and hives.         Objective:     Physical Exam   Constitutional: She is oriented to person, place, and time. She appears well-developed. No distress.   HENT:   Head: Normocephalic.   Eyes: Pupils are equal, round, and reactive to light. Conjunctivae are normal. No scleral icterus.   Neck: Normal range of motion. Neck supple. Normal carotid pulses, no hepatojugular reflux and no JVD present. Carotid bruit is not present. No edema present. No thyroid mass and no thyromegaly present.   Cardiovascular: Normal rate, regular rhythm, S1 normal, S2 normal, normal heart sounds and intact distal pulses. PMI is not displaced.  Exam reveals no gallop and no friction rub.   No murmur heard.  Pulses:       Carotid pulses are 2+ on the right side, and 2+ on the left side.       Radial pulses are 2+ on the right side, and 2+ on the left side.        Femoral pulses are 2+ on the right side, and 2+ on the left side.       Popliteal pulses are 2+ on the right side, and 2+ on the left side.        Dorsalis pedis pulses are 2+ on the right side, and 2+ on the left side.        Posterior tibial pulses are 2+ on the right side, and 2+ on the left side.   Pulmonary/Chest: Effort normal and breath sounds normal. She has no wheezes. She has no rales. She exhibits no tenderness.   Abdominal: Soft. Bowel sounds are normal. She exhibits no pulsatile midline mass and no mass. There is no hepatosplenomegaly. There is no tenderness.   Musculoskeletal: Normal range of motion. She exhibits no edema or tenderness.        Cervical back: Normal.        Thoracic back: Normal.        Lumbar back: Normal.   Lymphadenopathy:     She has no cervical adenopathy.     She has no axillary adenopathy.        Right: No supraclavicular adenopathy present.        Left: No supraclavicular adenopathy present.   Neurological: She is alert and oriented to person, place, and time. She has normal strength and normal reflexes. No sensory deficit. Gait normal.   Skin: Skin is warm. No rash noted. No cyanosis. No pallor. Nails show no clubbing.   Psychiatric: She has a normal mood and affect. Her speech is normal and behavior is normal. Cognition and memory are normal.       Assessment:     1. Palpitations    2. WILLIS (dyspnea on exertion)    3. Insulin resistance    4. Metabolic syndrome        Plan:     NO CLINICAL EVIDENCE OF STRUCTURAL OR FUNCTIONAL HEART DISEASE    OBSERVATION FOR 6 MONTHS  ENCOURAGE- WEIGHT LOSS.  REGULAR EXERCISE  REVIEW LAB TEST RECENTLY ORDERED BY PCP  RETURN IN 6 MONTHS OR PRN IF SYMPTOMS GET WORSE

## 2019-11-27 ENCOUNTER — LAB VISIT (OUTPATIENT)
Dept: LAB | Facility: HOSPITAL | Age: 39
End: 2019-11-27
Attending: INTERNAL MEDICINE
Payer: COMMERCIAL

## 2019-11-27 DIAGNOSIS — Z76.89 ENCOUNTER TO ESTABLISH CARE: ICD-10-CM

## 2019-11-27 LAB
25(OH)D3+25(OH)D2 SERPL-MCNC: 33 NG/ML (ref 30–96)
ALBUMIN SERPL BCP-MCNC: 3.5 G/DL (ref 3.5–5.2)
ALP SERPL-CCNC: 87 U/L (ref 55–135)
ALT SERPL W/O P-5'-P-CCNC: 18 U/L (ref 10–44)
ANION GAP SERPL CALC-SCNC: 10 MMOL/L (ref 8–16)
AST SERPL-CCNC: 13 U/L (ref 10–40)
BASOPHILS # BLD AUTO: 0.02 K/UL (ref 0–0.2)
BASOPHILS NFR BLD: 0.4 % (ref 0–1.9)
BILIRUB SERPL-MCNC: 0.6 MG/DL (ref 0.1–1)
BUN SERPL-MCNC: 13 MG/DL (ref 6–20)
CALCIUM SERPL-MCNC: 8.8 MG/DL (ref 8.7–10.5)
CHLORIDE SERPL-SCNC: 106 MMOL/L (ref 95–110)
CHOLEST SERPL-MCNC: 203 MG/DL (ref 120–199)
CHOLEST/HDLC SERPL: 4.4 {RATIO} (ref 2–5)
CO2 SERPL-SCNC: 21 MMOL/L (ref 23–29)
CREAT SERPL-MCNC: 0.7 MG/DL (ref 0.5–1.4)
DIFFERENTIAL METHOD: ABNORMAL
EOSINOPHIL # BLD AUTO: 0 K/UL (ref 0–0.5)
EOSINOPHIL NFR BLD: 0.8 % (ref 0–8)
ERYTHROCYTE [DISTWIDTH] IN BLOOD BY AUTOMATED COUNT: 13.2 % (ref 11.5–14.5)
EST. GFR  (AFRICAN AMERICAN): >60 ML/MIN/1.73 M^2
EST. GFR  (NON AFRICAN AMERICAN): >60 ML/MIN/1.73 M^2
GLUCOSE SERPL-MCNC: 232 MG/DL (ref 70–110)
HCT VFR BLD AUTO: 42.8 % (ref 37–48.5)
HDLC SERPL-MCNC: 46 MG/DL (ref 40–75)
HDLC SERPL: 22.7 % (ref 20–50)
HGB BLD-MCNC: 13.9 G/DL (ref 12–16)
IMM GRANULOCYTES # BLD AUTO: 0.01 K/UL (ref 0–0.04)
IMM GRANULOCYTES NFR BLD AUTO: 0.2 % (ref 0–0.5)
LDLC SERPL CALC-MCNC: 140 MG/DL (ref 63–159)
LYMPHOCYTES # BLD AUTO: 1.1 K/UL (ref 1–4.8)
LYMPHOCYTES NFR BLD: 20.4 % (ref 18–48)
MCH RBC QN AUTO: 31.1 PG (ref 27–31)
MCHC RBC AUTO-ENTMCNC: 32.5 G/DL (ref 32–36)
MCV RBC AUTO: 96 FL (ref 82–98)
MONOCYTES # BLD AUTO: 0.4 K/UL (ref 0.3–1)
MONOCYTES NFR BLD: 6.7 % (ref 4–15)
NEUTROPHILS # BLD AUTO: 3.7 K/UL (ref 1.8–7.7)
NEUTROPHILS NFR BLD: 71.5 % (ref 38–73)
NONHDLC SERPL-MCNC: 157 MG/DL
NRBC BLD-RTO: 0 /100 WBC
PLATELET # BLD AUTO: 235 K/UL (ref 150–350)
PMV BLD AUTO: 10.9 FL (ref 9.2–12.9)
POTASSIUM SERPL-SCNC: 4 MMOL/L (ref 3.5–5.1)
PROT SERPL-MCNC: 6.3 G/DL (ref 6–8.4)
RBC # BLD AUTO: 4.47 M/UL (ref 4–5.4)
SODIUM SERPL-SCNC: 137 MMOL/L (ref 136–145)
TRIGL SERPL-MCNC: 85 MG/DL (ref 30–150)
WBC # BLD AUTO: 5.2 K/UL (ref 3.9–12.7)

## 2019-11-27 PROCEDURE — 36415 COLL VENOUS BLD VENIPUNCTURE: CPT | Mod: PO

## 2019-11-27 PROCEDURE — 80061 LIPID PANEL: CPT

## 2019-11-27 PROCEDURE — 82306 VITAMIN D 25 HYDROXY: CPT

## 2019-11-27 PROCEDURE — 80053 COMPREHEN METABOLIC PANEL: CPT

## 2019-11-27 PROCEDURE — 85025 COMPLETE CBC W/AUTO DIFF WBC: CPT

## 2019-11-29 DIAGNOSIS — R73.9 HYPERGLYCEMIA: Primary | ICD-10-CM

## 2020-05-05 ENCOUNTER — TELEPHONE (OUTPATIENT)
Dept: OTOLARYNGOLOGY | Facility: CLINIC | Age: 40
End: 2020-05-05

## 2020-05-05 NOTE — TELEPHONE ENCOUNTER
pt lives in [place] and she will call the clinic there to see someone in office since dr. zhao is not in [place] right now

## 2020-05-05 NOTE — TELEPHONE ENCOUNTER
----- Message from Noris Curtis LPN sent at 5/5/2020 12:05 PM CDT -----  Contact: pt      ----- Message -----  From: Kim Cancino  Sent: 5/5/2020  10:48 AM CDT  To: Radha ADORNO Staff    Pt states she's had a ear infection since Thursday.  She believes the medication is making it worse.  She would like to speak to office about the medication before making an appt. [phone]

## 2020-05-06 ENCOUNTER — OFFICE VISIT (OUTPATIENT)
Dept: OTOLARYNGOLOGY | Facility: CLINIC | Age: 40
End: 2020-05-06
Payer: COMMERCIAL

## 2020-05-06 VITALS
HEART RATE: 60 BPM | BODY MASS INDEX: 34.76 KG/M2 | SYSTOLIC BLOOD PRESSURE: 143 MMHG | WEIGHT: 196.19 LBS | DIASTOLIC BLOOD PRESSURE: 92 MMHG | TEMPERATURE: 98 F

## 2020-05-06 DIAGNOSIS — H72.93 TYMPANIC MEMBRANE PERFORATION, BILATERAL: ICD-10-CM

## 2020-05-06 DIAGNOSIS — H90.0 CONDUCTIVE HEARING LOSS, BILATERAL: ICD-10-CM

## 2020-05-06 DIAGNOSIS — H92.01 RIGHT EAR PAIN: ICD-10-CM

## 2020-05-06 DIAGNOSIS — H60.391 INFECTIVE OTITIS EXTERNA OF RIGHT EAR: Primary | ICD-10-CM

## 2020-05-06 PROCEDURE — 99999 PR PBB SHADOW E&M-EST. PATIENT-LVL III: CPT | Mod: PBBFAC,,, | Performed by: PHYSICIAN ASSISTANT

## 2020-05-06 PROCEDURE — 99214 PR OFFICE/OUTPT VISIT, EST, LEVL IV, 30-39 MIN: ICD-10-PCS | Mod: S$GLB,,, | Performed by: PHYSICIAN ASSISTANT

## 2020-05-06 PROCEDURE — 3008F BODY MASS INDEX DOCD: CPT | Mod: CPTII,S$GLB,, | Performed by: PHYSICIAN ASSISTANT

## 2020-05-06 PROCEDURE — 3008F PR BODY MASS INDEX (BMI) DOCUMENTED: ICD-10-PCS | Mod: CPTII,S$GLB,, | Performed by: PHYSICIAN ASSISTANT

## 2020-05-06 PROCEDURE — 99999 PR PBB SHADOW E&M-EST. PATIENT-LVL III: ICD-10-PCS | Mod: PBBFAC,,, | Performed by: PHYSICIAN ASSISTANT

## 2020-05-06 PROCEDURE — 99214 OFFICE O/P EST MOD 30 MIN: CPT | Mod: S$GLB,,, | Performed by: PHYSICIAN ASSISTANT

## 2020-05-06 RX ORDER — NEOMYCIN SULFATE, POLYMYXIN B SULFATE, HYDROCORTISONE 3.5; 10000; 1 MG/ML; [USP'U]/ML; MG/ML
3 SOLUTION/ DROPS AURICULAR (OTIC)
COMMUNITY
Start: 2020-04-30 | End: 2020-05-06 | Stop reason: ALTCHOICE

## 2020-05-06 RX ORDER — FLUCONAZOLE 150 MG/1
150 TABLET ORAL DAILY
Qty: 1 TABLET | Refills: 1 | Status: SHIPPED | OUTPATIENT
Start: 2020-05-06 | End: 2020-05-07

## 2020-05-06 RX ORDER — OFLOXACIN 3 MG/ML
3 SOLUTION AURICULAR (OTIC) 2 TIMES DAILY
Qty: 5 ML | Refills: 0 | Status: SHIPPED | OUTPATIENT
Start: 2020-05-06 | End: 2020-05-16

## 2020-05-06 NOTE — PROGRESS NOTES
Subjective:       Patient ID: Duane Romero is a 39 y.o. female.    Chief Complaint: Otalgia (Right ear) and Ear Drainage (Right ear)    Patient is a very pleasant 39-year-old female here to see me today for evaluation of RIGHT ear pain and drainage for past week.  She has hx of CSOM with 10 sets of ear tubes as a child.  She says that she eventually had a retained ear tube as well as bilateral perforations.  The retained tube was removed at about 17-18 years of age.  They attempted repair of her left tympanic membrane perforation around 1997 with a postauricular approach.  However, that was not successful, and she has not had any further attempts at repair.  She has also followed with Dr. Lora at [facility] and Dr. Cortez and saw Dr. Kelley in 2019 and has been considering BAHA.     She reports right ear pain started about one week ago.  Was seen at [facility] and started on Augmentin (stopped after 2 days because she thought causing yeast infection), Cortisporin drops (stopped due to burning in the ear and throat) and Prednisone (she did not start it).  She's noticed her ears are popping and initially had pain with manipulation of right external ear but says that's now improved.  No issues with her left ear currently but says her right ear continues to drain clear, sticky fluid.  No recent fever.  She does have hearing loss AU at baseline, and has noticed RIGHT hearing is slightly worse since drainage began.  She denies any dizziness.  She does have tinnitus at her baseline.  She also has a history of significant allergies.  She has had allergy testing in the past, and did reveal reactions to multiple different aeroallergens.  Not currently using Flonase.    Review of Systems   Constitutional: Negative for activity change, appetite change, fatigue and fever.   HENT: Positive for ear discharge (AD), ear pain (AD), hearing loss, rhinorrhea (clear) and tinnitus. Negative for congestion, postnasal drip,  sinus pressure, sinus pain and sneezing.    Respiratory: Negative for cough.    Neurological: Negative for dizziness.       Objective:      Physical Exam   Constitutional: She appears well-developed and well-nourished. She is cooperative.  Non-toxic appearance. She does not have a sickly appearance.   HENT:   Head: Normocephalic and atraumatic.   Right Ear: External ear and ear canal normal. No drainage, swelling (mild erythema of canal) or tenderness. Tympanic membrane is injected, perforated and erythematous.   Left Ear: External ear and ear canal normal. No drainage, swelling or tenderness. Tympanic membrane is perforated.   Ears:    Nose: Nose normal.   Mouth/Throat: Uvula is midline, oropharynx is clear and moist and mucous membranes are normal. No trismus in the jaw.   Neck: Normal range of motion. No thyroid mass present.   Lymphadenopathy:     She has no cervical adenopathy.       Assessment:       1. Infective otitis externa of right ear    2. Tympanic membrane perforation, bilateral    3. Right ear pain    4. Conductive hearing loss, bilateral        Plan:         1. OE right ear:  She has OE of the right ear, but cannot generally tolerate topical ear drops.  Will change from Cortipsorin to Floxin ear drops to her right ear.  I also recommend she complete Augmentin as prescribed due to difficulty with drops.  Will also send in Diflucan as requested for yeast infection.  Discussed dry ear precautions.  RTC in 2-3 weeks for recheck.  She says she might followup with Dr. Arellano to discuss possible BAHA.  2.  TM perforation bilaterally:  Has been stable for some time, she is not interested in repair at this time after seeing Dr. Kelley last year.  She says she may wish to see Dr. Arellano upon return visit to discuss possible BAHA but she's still considering it at this time.    3.  Bilateral hearing loss:  I would recommend a repeat audiogram at time of return visit for comparison; last done in 1/2019.

## 2020-05-14 ENCOUNTER — TELEPHONE (OUTPATIENT)
Dept: OTOLARYNGOLOGY | Facility: CLINIC | Age: 40
End: 2020-05-14

## 2020-05-14 NOTE — TELEPHONE ENCOUNTER
Pt states that her ear is still popping but not painful and wanted to know what to do. Pt informed to continue medication and if the ssx do not subside to call us and we will get her in for a follow up with Dr Garcia or Beverley        ----- Message from Barak Villegas sent at 5/14/2020  4:08 PM CDT -----  Contact: pt   Pt would like cb states she has some questions declined to elaborate.       [phone]

## 2020-10-13 ENCOUNTER — OFFICE VISIT (OUTPATIENT)
Dept: OTOLARYNGOLOGY | Facility: CLINIC | Age: 40
End: 2020-10-13
Payer: COMMERCIAL

## 2020-10-13 VITALS
DIASTOLIC BLOOD PRESSURE: 74 MMHG | SYSTOLIC BLOOD PRESSURE: 128 MMHG | BODY MASS INDEX: 33.43 KG/M2 | WEIGHT: 188.69 LBS | HEART RATE: 62 BPM | TEMPERATURE: 98 F

## 2020-10-13 DIAGNOSIS — H92.12 OTORRHEA OF LEFT EAR: Primary | ICD-10-CM

## 2020-10-13 DIAGNOSIS — H60.391 INFECTIVE OTITIS EXTERNA OF RIGHT EAR: ICD-10-CM

## 2020-10-13 PROCEDURE — 99213 PR OFFICE/OUTPT VISIT, EST, LEVL III, 20-29 MIN: ICD-10-PCS | Mod: S$GLB,,, | Performed by: PHYSICIAN ASSISTANT

## 2020-10-13 PROCEDURE — 99213 OFFICE O/P EST LOW 20 MIN: CPT | Mod: S$GLB,,, | Performed by: PHYSICIAN ASSISTANT

## 2020-10-13 PROCEDURE — 87186 SC STD MICRODIL/AGAR DIL: CPT

## 2020-10-13 PROCEDURE — 3008F PR BODY MASS INDEX (BMI) DOCUMENTED: ICD-10-PCS | Mod: CPTII,S$GLB,, | Performed by: PHYSICIAN ASSISTANT

## 2020-10-13 PROCEDURE — 99999 PR PBB SHADOW E&M-EST. PATIENT-LVL III: ICD-10-PCS | Mod: PBBFAC,,, | Performed by: PHYSICIAN ASSISTANT

## 2020-10-13 PROCEDURE — 87077 CULTURE AEROBIC IDENTIFY: CPT

## 2020-10-13 PROCEDURE — 99999 PR PBB SHADOW E&M-EST. PATIENT-LVL III: CPT | Mod: PBBFAC,,, | Performed by: PHYSICIAN ASSISTANT

## 2020-10-13 PROCEDURE — 87070 CULTURE OTHR SPECIMN AEROBIC: CPT

## 2020-10-13 PROCEDURE — 3008F BODY MASS INDEX DOCD: CPT | Mod: CPTII,S$GLB,, | Performed by: PHYSICIAN ASSISTANT

## 2020-10-13 RX ORDER — OFLOXACIN 3 MG/ML
3 SOLUTION AURICULAR (OTIC) 2 TIMES DAILY
Qty: 5 ML | Refills: 0 | Status: SHIPPED | OUTPATIENT
Start: 2020-10-13 | End: 2020-10-23

## 2020-10-13 RX ORDER — CEFDINIR 300 MG/1
300 CAPSULE ORAL 2 TIMES DAILY
Qty: 20 CAPSULE | Refills: 0 | Status: SHIPPED | OUTPATIENT
Start: 2020-10-13 | End: 2020-10-23

## 2020-10-13 RX ORDER — FLUCONAZOLE 150 MG/1
150 TABLET ORAL DAILY
Qty: 1 TABLET | Refills: 0 | Status: SHIPPED | OUTPATIENT
Start: 2020-10-13 | End: 2020-10-14

## 2020-10-13 NOTE — PROGRESS NOTES
Subjective:   Patient ID: Duane Romero is a 40 y.o. female.    Chief Complaint: Otitis Media    Patient is a very pleasant 40-year-old female here to see me today for evaluation of LEFt ear pain and drainage for past week.  She has hx of CSOM with 10 sets of ear tubes as a child.  She says that she eventually had a retained ear tube as well as bilateral perforations.  The retained tube was removed at about 17-18 years of age.  They attempted repair of her left tympanic membrane perforation around 1997 with a postauricular approach.  However, that was not successful, and she has not had any further attempts at repair.  She has also followed with Dr. Lora at [facility] and Dr. Cortez and saw Dr. Kelley in 2019 and has been considering BAHA.      She reports right ear pain started about one week ago. She's noticed her ears are popping and initially had pain with manipulation of right external ear but says that's now improved. No recent fever.  She does have hearing loss AU at baseline. She denies any dizziness.  She does have tinnitus at her baseline.  She also has a history of significant allergies.  She has had allergy testing in the past, and did reveal reactions to multiple different aeroallergens.         Review of patient's allergies indicates:   Allergen Reactions    Pcn [penicillins] Itching     Injection only.  Can tolerate oral form           Review of Systems   Constitutional: Negative for chills, fatigue, fever and unexpected weight change.   HENT: Positive for ear discharge, ear pain and hearing loss. Negative for congestion, dental problem, facial swelling, nosebleeds, postnasal drip, rhinorrhea, sinus pressure, sneezing, sore throat, tinnitus, trouble swallowing and voice change.    Eyes: Negative for redness, itching and visual disturbance.   Respiratory: Negative for cough, choking, shortness of breath and wheezing.    Cardiovascular: Negative for chest pain and palpitations.    Gastrointestinal: Negative for abdominal pain.        No reflux.   Musculoskeletal: Negative for gait problem.   Skin: Negative for rash.   Neurological: Negative for dizziness, light-headedness and headaches.         Objective:   /74   Pulse 62   Temp 98.2 °F (36.8 °C) (Temporal)   Wt 85.6 kg (188 lb 11.4 oz)   BMI 33.43 kg/m²     Physical Exam       Assessment:     1. Otorrhea of left ear    2. Infective otitis externa of right ear        Plan:     Otorrhea of left ear  -     Aerobic culture    Infective otitis externa of right ear    Other orders  -     cefdinir (OMNICEF) 300 MG capsule; Take 1 capsule (300 mg total) by mouth 2 (two) times daily. for 10 days  Dispense: 20 capsule; Refill: 0  -     ofloxacin (FLOXIN) 0.3 % otic solution; Place 3 drops into both ears 2 (two) times daily. for 10 days  Dispense: 5 mL; Refill: 0  -     fluconazole (DIFLUCAN) 150 MG Tab; Take 1 tablet (150 mg total) by mouth once daily. for 1 day  Dispense: 1 tablet; Refill: 0      I have sent fluid for culture and will add oral antibiotics AND START HER ON OFLOXACIN. She states the steroid in cortisporin causes stinging. Return to clinic in 2 weeks or sooner if needed.

## 2020-10-13 NOTE — LETTER
October 13, 2020      [facility]  [address]  [place] [address]  Phone: [phone]  Fax: [phone]       Patient: Duane Romero   YOB: 1980  Date of Visit: 10/13/2020    To Whom It May Concern:    Napoleon Romero  was at [facility] on 10/13/2020. She may return to work/school on 10/13/2020 with no restrictions. If you have any questions or concerns, or if I can be of further assistance, please do not hesitate to contact me.    Sincerely,      Steph Fontaine MA

## 2020-10-16 LAB — BACTERIA SPEC AEROBE CULT: ABNORMAL

## 2020-10-27 ENCOUNTER — OFFICE VISIT (OUTPATIENT)
Dept: OTOLARYNGOLOGY | Facility: CLINIC | Age: 40
End: 2020-10-27
Payer: COMMERCIAL

## 2020-10-27 VITALS
SYSTOLIC BLOOD PRESSURE: 123 MMHG | WEIGHT: 188.69 LBS | TEMPERATURE: 98 F | BODY MASS INDEX: 33.43 KG/M2 | DIASTOLIC BLOOD PRESSURE: 79 MMHG | HEART RATE: 70 BPM

## 2020-10-27 DIAGNOSIS — H92.12 OTORRHEA OF LEFT EAR: Primary | ICD-10-CM

## 2020-10-27 PROCEDURE — 99213 PR OFFICE/OUTPT VISIT, EST, LEVL III, 20-29 MIN: ICD-10-PCS | Mod: S$GLB,,, | Performed by: PHYSICIAN ASSISTANT

## 2020-10-27 PROCEDURE — 99213 OFFICE O/P EST LOW 20 MIN: CPT | Mod: S$GLB,,, | Performed by: PHYSICIAN ASSISTANT

## 2020-10-27 PROCEDURE — 99999 PR PBB SHADOW E&M-EST. PATIENT-LVL III: ICD-10-PCS | Mod: PBBFAC,,, | Performed by: PHYSICIAN ASSISTANT

## 2020-10-27 PROCEDURE — 3008F BODY MASS INDEX DOCD: CPT | Mod: CPTII,S$GLB,, | Performed by: PHYSICIAN ASSISTANT

## 2020-10-27 PROCEDURE — 99999 PR PBB SHADOW E&M-EST. PATIENT-LVL III: CPT | Mod: PBBFAC,,, | Performed by: PHYSICIAN ASSISTANT

## 2020-10-27 PROCEDURE — 3008F PR BODY MASS INDEX (BMI) DOCUMENTED: ICD-10-PCS | Mod: CPTII,S$GLB,, | Performed by: PHYSICIAN ASSISTANT

## 2020-10-27 RX ORDER — TRIAMCINOLONE ACETONIDE 0.25 MG/G
CREAM TOPICAL 2 TIMES DAILY
Qty: 15 G | Refills: 2 | Status: SHIPPED | OUTPATIENT
Start: 2020-10-27 | End: 2022-06-03

## 2020-10-27 NOTE — PROGRESS NOTES
Subjective:   Patient ID: Duane Romero is a 40 y.o. female.    Chief Complaint: Follow-up (Ear Infection)    Patient is a very pleasant 40-year-old female here to see me today for follow up of LEFt ear pain and drainage . I last saw her 10/13 and treated her with omnicef and ofloxacin. She states that her symptoms have improved.  She has hx of CSOM with 10 sets of ear tubes as a child.  She says that she eventually had a retained ear tube as well as bilateral perforations.  The retained tube was removed at about 17-18 years of age.  They attempted repair of her left tympanic membrane perforation around 1997 with a postauricular approach.  However, that was not successful, and she has not had any further attempts at repair.  She has also followed with Dr. Lora at [facility] and Dr. Cortez and saw Dr. Kelley in 2019 and has been considering BAHA.          Review of patient's allergies indicates:   Allergen Reactions    Pcn [penicillins] Itching     Injection only.  Can tolerate oral form           Review of Systems   Constitutional: Negative for chills, fatigue, fever and unexpected weight change.   HENT: Positive for hearing loss. Negative for congestion, dental problem, ear discharge, ear pain, facial swelling, nosebleeds, postnasal drip, rhinorrhea, sinus pressure, sneezing, sore throat, tinnitus, trouble swallowing and voice change.    Eyes: Negative for redness, itching and visual disturbance.   Respiratory: Negative for cough, choking, shortness of breath and wheezing.    Cardiovascular: Negative for chest pain and palpitations.   Gastrointestinal: Negative for abdominal pain.        No reflux.   Musculoskeletal: Negative for gait problem.   Skin: Negative for rash.   Neurological: Negative for dizziness, light-headedness and headaches.         Objective:   /79   Pulse 70   Temp 98.4 °F (36.9 °C) (Temporal)   Wt 85.6 kg (188 lb 11.4 oz)   BMI 33.43 kg/m²     Physical Exam  Constitutional:        General: She is not in acute distress.     Appearance: She is well-developed.   HENT:      Head: Normocephalic and atraumatic.      Right Ear: Tympanic membrane, ear canal and external ear normal.      Left Ear: Tympanic membrane, ear canal and external ear normal. Drainage (very mild- much improved from last visit) present.      Nose: Nose normal. No nasal deformity, septal deviation, mucosal edema or rhinorrhea.      Right Sinus: No maxillary sinus tenderness or frontal sinus tenderness.      Left Sinus: No maxillary sinus tenderness or frontal sinus tenderness.      Mouth/Throat:      Mouth: Mucous membranes are not pale and not dry.      Dentition: No dental caries.      Pharynx: Uvula midline. No oropharyngeal exudate or posterior oropharyngeal erythema.   Eyes:      General: Lids are normal. No scleral icterus.     Extraocular Movements:      Right eye: Normal extraocular motion and no nystagmus.      Left eye: Normal extraocular motion and no nystagmus.      Conjunctiva/sclera: Conjunctivae normal.      Right eye: Right conjunctiva is not injected. No chemosis.     Left eye: Left conjunctiva is not injected. No chemosis.     Pupils: Pupils are equal, round, and reactive to light.   Neck:      Thyroid: No thyroid mass or thyromegaly.      Trachea: Trachea and phonation normal. No tracheal tenderness or tracheal deviation.   Pulmonary:      Effort: Pulmonary effort is normal. No respiratory distress.      Breath sounds: No stridor.   Abdominal:      General: There is no distension.   Lymphadenopathy:      Head:      Right side of head: No submental, submandibular, preauricular, posterior auricular or occipital adenopathy.      Left side of head: No submental, submandibular, preauricular, posterior auricular or occipital adenopathy.      Cervical: No cervical adenopathy.   Skin:     General: Skin is warm and dry.      Findings: No erythema or rash.   Neurological:      Mental Status: She is alert and  oriented to person, place, and time.      Cranial Nerves: No cranial nerve deficit.   Psychiatric:         Behavior: Behavior normal.          Assessment:     1. Otorrhea of left ear        Plan:     Otorrhea of left ear    Other orders  -     triamcinolone acetonide 0.025% (KENALOG) 0.025 % cream; Apply topically 2 (two) times daily.  Dispense: 15 g; Refill: 2      Otorrhea has resolved. I have written her for steroid cream to apply to left bulb of ear for itching. She would like to be re-evaluated for BAHA. Will set up with audiology.

## 2020-12-03 ENCOUNTER — CLINICAL SUPPORT (OUTPATIENT)
Dept: AUDIOLOGY | Facility: CLINIC | Age: 40
End: 2020-12-03
Payer: COMMERCIAL

## 2020-12-03 ENCOUNTER — OFFICE VISIT (OUTPATIENT)
Dept: OTOLARYNGOLOGY | Facility: CLINIC | Age: 40
End: 2020-12-03
Payer: COMMERCIAL

## 2020-12-03 VITALS
TEMPERATURE: 98 F | SYSTOLIC BLOOD PRESSURE: 134 MMHG | HEART RATE: 49 BPM | DIASTOLIC BLOOD PRESSURE: 77 MMHG | BODY MASS INDEX: 32.89 KG/M2 | HEIGHT: 63 IN | WEIGHT: 185.63 LBS

## 2020-12-03 DIAGNOSIS — H72.93 TYMPANIC MEMBRANE PERFORATION, BILATERAL: Primary | ICD-10-CM

## 2020-12-03 DIAGNOSIS — H90.2 HEARING LOSS, CONDUCTIVE, TYMPANIC MEMBRANE: Primary | ICD-10-CM

## 2020-12-03 DIAGNOSIS — Z71.89 HEARING AID CONSULTATION: Primary | ICD-10-CM

## 2020-12-03 DIAGNOSIS — H90.0 CONDUCTIVE HEARING LOSS, BILATERAL: ICD-10-CM

## 2020-12-03 PROCEDURE — 1126F PR PAIN SEVERITY QUANTIFIED, NO PAIN PRESENT: ICD-10-PCS | Mod: S$GLB,,, | Performed by: OTOLARYNGOLOGY

## 2020-12-03 PROCEDURE — 92567 PR TYMPA2METRY: ICD-10-PCS | Mod: S$GLB,,, | Performed by: AUDIOLOGIST

## 2020-12-03 PROCEDURE — 92567 TYMPANOMETRY: CPT | Mod: S$GLB,,, | Performed by: AUDIOLOGIST

## 2020-12-03 PROCEDURE — 3008F BODY MASS INDEX DOCD: CPT | Mod: CPTII,S$GLB,, | Performed by: OTOLARYNGOLOGY

## 2020-12-03 PROCEDURE — 92557 COMPREHENSIVE HEARING TEST: CPT | Mod: S$GLB,,, | Performed by: AUDIOLOGIST

## 2020-12-03 PROCEDURE — 3008F PR BODY MASS INDEX (BMI) DOCUMENTED: ICD-10-PCS | Mod: CPTII,S$GLB,, | Performed by: OTOLARYNGOLOGY

## 2020-12-03 PROCEDURE — 99999 PR PBB SHADOW E&M-EST. PATIENT-LVL III: CPT | Mod: PBBFAC,,, | Performed by: OTOLARYNGOLOGY

## 2020-12-03 PROCEDURE — 99213 OFFICE O/P EST LOW 20 MIN: CPT | Mod: S$GLB,,, | Performed by: OTOLARYNGOLOGY

## 2020-12-03 PROCEDURE — 99213 PR OFFICE/OUTPT VISIT, EST, LEVL III, 20-29 MIN: ICD-10-PCS | Mod: S$GLB,,, | Performed by: OTOLARYNGOLOGY

## 2020-12-03 PROCEDURE — 92557 PR COMPREHENSIVE HEARING TEST: ICD-10-PCS | Mod: S$GLB,,, | Performed by: AUDIOLOGIST

## 2020-12-03 PROCEDURE — 99999 PR PBB SHADOW E&M-EST. PATIENT-LVL III: ICD-10-PCS | Mod: PBBFAC,,, | Performed by: OTOLARYNGOLOGY

## 2020-12-03 PROCEDURE — 1126F AMNT PAIN NOTED NONE PRSNT: CPT | Mod: S$GLB,,, | Performed by: OTOLARYNGOLOGY

## 2020-12-03 RX ORDER — FLUCONAZOLE 150 MG/1
TABLET ORAL
COMMUNITY
Start: 2020-11-02 | End: 2022-05-02

## 2020-12-03 NOTE — PROGRESS NOTES
Duane Romero was seen 12/03/2020 for an audiological evaluation.  Patient here for repeat audiogram and to determine her surgical and non surgical options.     Results reveal a mild-to-moderate conductive hearing loss 250-3000 Hz for the right ear, and  mild-to-moderately severe sensorineural hearing loss 250-8000 Hz for the left ear.   Speech Reception Thresholds were  30 dBHL for the right ear and 35 dBHL for the left ear.   Word recognition scores were excellent for the right ear and excellent for the left ear.   Tympanograms were Type B large volume for the right ear and Type B large volume for the left ear.    Patient was counseled on the above findings.    Recommendations include:    1.  ENT followup  2.  BAHA/Osia/Hearing aid consult today   3.  Wear hearing protective devices around loud noise  4.  Annual audiograms

## 2020-12-03 NOTE — PROGRESS NOTES
Duane Romero was seen 12/03/2020 for a consult to discuss hearing aids vs. BAHA vs. Osia. Vs revision tympanoplasty.. She has bilateral marginal tympanic membrane perforations, narrow ear canals,  and history of prior failed tympanoplasty.     Duane would like to re-visit all of her treatment options for better hearing. She had a BAHA evaluation last year and did very well. She is not sure about having an abutment with the BAHA. We discussed the new Osia by [employer]chle[employer] today as well. She will check with [employer] to determine if she has any coverage towards hearing aids.      She has many questions for Dr. Kelley and will follow-up with him today.     Patient is unsure which option she would like to pursue at this time.  She will contact the clinic with her decision in the future.

## 2020-12-03 NOTE — PROGRESS NOTES
Subjective:       Patient ID: Duane Romero is a 40 y.o. female.    Chief Complaint: Hearing loss    HPI       Duane Romero is a 40 y.o. female with history of bilateral tympanic membrane perforations for over a decade.  She also has associated bilateral conductive hearing loss worse in the left ear.  She has had a previous tympanoplasty 1 of her ears that did not take.  Previously we discussed options for hearing rehabilitation she is here to read discussed these options with her .  She reports in the last year she has had a few infections of her ears    Review of Systems   Constitutional: Negative for chills and fever.   HENT: Negative for sore throat and trouble swallowing.    Respiratory: Negative for apnea and chest tightness.    Cardiovascular: Negative for chest pain.         Objective:      Physical Exam  Constitutional:       Appearance: She is well-developed.   HENT:      Head: Normocephalic and atraumatic.      Right Ear: Ear canal and external ear normal. Tympanic membrane is perforated.      Left Ear: Ear canal and external ear normal. Tympanic membrane is perforated.      Ears:        Comments: Binocular Microscopy  Indications: perforation, pre op planning  Details: binocular microscopy used to examine both ears  Findings  AD:  Anterior inferior perforation dry unable to see anterior margin due to small ear canal and anterior hump of the of ear  AS:       Nose: Nose normal.      Mouth/Throat:      Pharynx: Uvula midline.   Neck:      Musculoskeletal: Normal range of motion.      Thyroid: No thyroid mass.         Assessment:       1. Tympanic membrane perforation, bilateral    2. Conductive hearing loss, bilateral        Plan:         the patient's problem and treatment thereof.  In summary she has pleasant 40-year-old with bilateral marginal tympanic membrane perforations, narrow ear canals,  and history of prior failed tympanoplasty.    We discussed treatment options  including traditional hearing aids, bone conducting hearing aids, and revision tympanoplasty.  Patient is unsure which option she would like to pursue at this time.  She will contact the clinic with her decision in the future.

## 2021-04-01 ENCOUNTER — TELEPHONE (OUTPATIENT)
Dept: OTOLARYNGOLOGY | Facility: CLINIC | Age: 41
End: 2021-04-01

## 2021-04-01 RX ORDER — OFLOXACIN 3 MG/ML
3 SOLUTION AURICULAR (OTIC) 2 TIMES DAILY
Qty: 5 ML | Refills: 0 | Status: SHIPPED | OUTPATIENT
Start: 2021-04-01 | End: 2021-04-11

## 2021-04-29 ENCOUNTER — PATIENT MESSAGE (OUTPATIENT)
Dept: RESEARCH | Facility: HOSPITAL | Age: 41
End: 2021-04-29

## 2021-07-01 ENCOUNTER — OFFICE VISIT (OUTPATIENT)
Dept: INTERNAL MEDICINE | Facility: CLINIC | Age: 41
End: 2021-07-01
Payer: COMMERCIAL

## 2021-07-01 DIAGNOSIS — M79.604 RIGHT LEG PAIN: Primary | ICD-10-CM

## 2021-07-01 PROCEDURE — 99213 PR OFFICE/OUTPT VISIT, EST, LEVL III, 20-29 MIN: ICD-10-PCS | Mod: 95,,, | Performed by: FAMILY MEDICINE

## 2021-07-01 PROCEDURE — 99213 OFFICE O/P EST LOW 20 MIN: CPT | Mod: 95,,, | Performed by: FAMILY MEDICINE

## 2021-10-19 ENCOUNTER — TELEPHONE (OUTPATIENT)
Dept: OTOLARYNGOLOGY | Facility: CLINIC | Age: 41
End: 2021-10-19

## 2021-10-19 RX ORDER — OFLOXACIN 3 MG/ML
5 SOLUTION AURICULAR (OTIC) 2 TIMES DAILY
Qty: 10 ML | Refills: 0 | Status: SHIPPED | OUTPATIENT
Start: 2021-10-19 | End: 2022-06-03

## 2021-10-20 ENCOUNTER — OFFICE VISIT (OUTPATIENT)
Dept: OTOLARYNGOLOGY | Facility: CLINIC | Age: 41
End: 2021-10-20
Payer: COMMERCIAL

## 2021-10-20 VITALS
DIASTOLIC BLOOD PRESSURE: 78 MMHG | SYSTOLIC BLOOD PRESSURE: 130 MMHG | BODY MASS INDEX: 32.06 KG/M2 | HEART RATE: 62 BPM | WEIGHT: 181 LBS | TEMPERATURE: 98 F

## 2021-10-20 DIAGNOSIS — H92.01 RIGHT EAR PAIN: ICD-10-CM

## 2021-10-20 DIAGNOSIS — H90.0 CONDUCTIVE HEARING LOSS, BILATERAL: ICD-10-CM

## 2021-10-20 DIAGNOSIS — H60.391 INFECTIVE OTITIS EXTERNA OF RIGHT EAR: ICD-10-CM

## 2021-10-20 DIAGNOSIS — H72.93 TYMPANIC MEMBRANE PERFORATION, BILATERAL: Primary | ICD-10-CM

## 2021-10-20 PROCEDURE — 3008F PR BODY MASS INDEX (BMI) DOCUMENTED: ICD-10-PCS | Mod: CPTII,S$GLB,, | Performed by: ORTHOPAEDIC SURGERY

## 2021-10-20 PROCEDURE — 3078F DIAST BP <80 MM HG: CPT | Mod: CPTII,S$GLB,, | Performed by: ORTHOPAEDIC SURGERY

## 2021-10-20 PROCEDURE — 99999 PR PBB SHADOW E&M-EST. PATIENT-LVL III: CPT | Mod: PBBFAC,,, | Performed by: ORTHOPAEDIC SURGERY

## 2021-10-20 PROCEDURE — 3008F BODY MASS INDEX DOCD: CPT | Mod: CPTII,S$GLB,, | Performed by: ORTHOPAEDIC SURGERY

## 2021-10-20 PROCEDURE — 3078F PR MOST RECENT DIASTOLIC BLOOD PRESSURE < 80 MM HG: ICD-10-PCS | Mod: CPTII,S$GLB,, | Performed by: ORTHOPAEDIC SURGERY

## 2021-10-20 PROCEDURE — 3075F SYST BP GE 130 - 139MM HG: CPT | Mod: CPTII,S$GLB,, | Performed by: ORTHOPAEDIC SURGERY

## 2021-10-20 PROCEDURE — 3075F PR MOST RECENT SYSTOLIC BLOOD PRESS GE 130-139MM HG: ICD-10-PCS | Mod: CPTII,S$GLB,, | Performed by: ORTHOPAEDIC SURGERY

## 2021-10-20 PROCEDURE — 99213 OFFICE O/P EST LOW 20 MIN: CPT | Mod: S$GLB,,, | Performed by: ORTHOPAEDIC SURGERY

## 2021-10-20 PROCEDURE — 1159F PR MEDICATION LIST DOCUMENTED IN MEDICAL RECORD: ICD-10-PCS | Mod: CPTII,S$GLB,, | Performed by: ORTHOPAEDIC SURGERY

## 2021-10-20 PROCEDURE — 1159F MED LIST DOCD IN RCRD: CPT | Mod: CPTII,S$GLB,, | Performed by: ORTHOPAEDIC SURGERY

## 2021-10-20 PROCEDURE — 99213 PR OFFICE/OUTPT VISIT, EST, LEVL III, 20-29 MIN: ICD-10-PCS | Mod: S$GLB,,, | Performed by: ORTHOPAEDIC SURGERY

## 2021-10-20 PROCEDURE — 99999 PR PBB SHADOW E&M-EST. PATIENT-LVL III: ICD-10-PCS | Mod: PBBFAC,,, | Performed by: ORTHOPAEDIC SURGERY

## 2021-10-20 RX ORDER — OFLOXACIN 3 MG/ML
3 SOLUTION AURICULAR (OTIC) 2 TIMES DAILY
Qty: 5 ML | Refills: 0 | Status: SHIPPED | OUTPATIENT
Start: 2021-10-20 | End: 2021-10-30

## 2021-10-20 RX ORDER — FLUTICASONE PROPIONATE 50 MCG
2 SPRAY, SUSPENSION (ML) NASAL DAILY
Qty: 16 G | Refills: 12 | Status: SHIPPED | OUTPATIENT
Start: 2021-10-20 | End: 2022-07-15

## 2021-10-22 ENCOUNTER — TELEPHONE (OUTPATIENT)
Dept: OTOLARYNGOLOGY | Facility: CLINIC | Age: 41
End: 2021-10-22

## 2021-10-26 ENCOUNTER — TELEPHONE (OUTPATIENT)
Dept: OTOLARYNGOLOGY | Facility: CLINIC | Age: 41
End: 2021-10-26
Payer: COMMERCIAL

## 2021-10-28 ENCOUNTER — OFFICE VISIT (OUTPATIENT)
Dept: OTOLARYNGOLOGY | Facility: CLINIC | Age: 41
End: 2021-10-28
Payer: COMMERCIAL

## 2021-10-28 VITALS — HEIGHT: 63 IN | WEIGHT: 182.75 LBS | BODY MASS INDEX: 32.38 KG/M2

## 2021-10-28 DIAGNOSIS — H66.014 RECURRENT ACUTE SUPPURATIVE OTITIS MEDIA OF RIGHT EAR WITH SPONTANEOUS RUPTURE OF TYMPANIC MEMBRANE: Primary | ICD-10-CM

## 2021-10-28 DIAGNOSIS — H72.93 TYMPANIC MEMBRANE PERFORATION, BILATERAL: ICD-10-CM

## 2021-10-28 PROCEDURE — 3008F BODY MASS INDEX DOCD: CPT | Mod: CPTII,S$GLB,, | Performed by: STUDENT IN AN ORGANIZED HEALTH CARE EDUCATION/TRAINING PROGRAM

## 2021-10-28 PROCEDURE — 92504 PR EAR MICROSCOPY EXAMINATION: ICD-10-PCS | Mod: S$GLB,,, | Performed by: STUDENT IN AN ORGANIZED HEALTH CARE EDUCATION/TRAINING PROGRAM

## 2021-10-28 PROCEDURE — 1159F MED LIST DOCD IN RCRD: CPT | Mod: CPTII,S$GLB,, | Performed by: STUDENT IN AN ORGANIZED HEALTH CARE EDUCATION/TRAINING PROGRAM

## 2021-10-28 PROCEDURE — 92504 EAR MICROSCOPY EXAMINATION: CPT | Mod: S$GLB,,, | Performed by: STUDENT IN AN ORGANIZED HEALTH CARE EDUCATION/TRAINING PROGRAM

## 2021-10-28 PROCEDURE — 99214 OFFICE O/P EST MOD 30 MIN: CPT | Mod: 25,S$GLB,, | Performed by: STUDENT IN AN ORGANIZED HEALTH CARE EDUCATION/TRAINING PROGRAM

## 2021-10-28 PROCEDURE — 99999 PR PBB SHADOW E&M-EST. PATIENT-LVL III: CPT | Mod: PBBFAC,,, | Performed by: STUDENT IN AN ORGANIZED HEALTH CARE EDUCATION/TRAINING PROGRAM

## 2021-10-28 PROCEDURE — 3008F PR BODY MASS INDEX (BMI) DOCUMENTED: ICD-10-PCS | Mod: CPTII,S$GLB,, | Performed by: STUDENT IN AN ORGANIZED HEALTH CARE EDUCATION/TRAINING PROGRAM

## 2021-10-28 PROCEDURE — 99214 PR OFFICE/OUTPT VISIT, EST, LEVL IV, 30-39 MIN: ICD-10-PCS | Mod: 25,S$GLB,, | Performed by: STUDENT IN AN ORGANIZED HEALTH CARE EDUCATION/TRAINING PROGRAM

## 2021-10-28 PROCEDURE — 99999 PR PBB SHADOW E&M-EST. PATIENT-LVL III: ICD-10-PCS | Mod: PBBFAC,,, | Performed by: STUDENT IN AN ORGANIZED HEALTH CARE EDUCATION/TRAINING PROGRAM

## 2021-10-28 PROCEDURE — 1159F PR MEDICATION LIST DOCUMENTED IN MEDICAL RECORD: ICD-10-PCS | Mod: CPTII,S$GLB,, | Performed by: STUDENT IN AN ORGANIZED HEALTH CARE EDUCATION/TRAINING PROGRAM

## 2021-10-28 PROCEDURE — 87075 CULTR BACTERIA EXCEPT BLOOD: CPT | Performed by: STUDENT IN AN ORGANIZED HEALTH CARE EDUCATION/TRAINING PROGRAM

## 2021-10-28 RX ORDER — SULFACETAMIDE SODIUM 100 MG/ML
SOLUTION/ DROPS OPHTHALMIC
Qty: 5 ML | Refills: 0 | Status: SHIPPED | OUTPATIENT
Start: 2021-10-28 | End: 2022-06-03

## 2021-10-28 RX ORDER — CLOBETASOL PROPIONATE 0.5 MG/G
CREAM TOPICAL
Qty: 15 G | Refills: 0 | Status: SHIPPED | OUTPATIENT
Start: 2021-10-28 | End: 2022-07-15

## 2021-11-03 LAB — BACTERIA SPEC ANAEROBE CULT: NORMAL

## 2021-11-24 ENCOUNTER — CLINICAL SUPPORT (OUTPATIENT)
Dept: AUDIOLOGY | Facility: CLINIC | Age: 41
End: 2021-11-24
Payer: COMMERCIAL

## 2021-11-24 DIAGNOSIS — H90.2 HEARING LOSS, CONDUCTIVE, TYMPANIC MEMBRANE: Primary | ICD-10-CM

## 2021-11-24 PROCEDURE — 92557 COMPREHENSIVE HEARING TEST: CPT | Mod: S$GLB,,, | Performed by: AUDIOLOGIST

## 2021-11-24 PROCEDURE — 92567 PR TYMPA2METRY: ICD-10-PCS | Mod: S$GLB,,, | Performed by: AUDIOLOGIST

## 2021-11-24 PROCEDURE — 92557 PR COMPREHENSIVE HEARING TEST: ICD-10-PCS | Mod: S$GLB,,, | Performed by: AUDIOLOGIST

## 2021-11-24 PROCEDURE — 92567 TYMPANOMETRY: CPT | Mod: S$GLB,,, | Performed by: AUDIOLOGIST

## 2021-12-03 ENCOUNTER — TELEPHONE (OUTPATIENT)
Dept: OTOLARYNGOLOGY | Facility: CLINIC | Age: 41
End: 2021-12-03
Payer: COMMERCIAL

## 2022-03-15 ENCOUNTER — PATIENT MESSAGE (OUTPATIENT)
Dept: OTOLARYNGOLOGY | Facility: CLINIC | Age: 42
End: 2022-03-15
Payer: COMMERCIAL

## 2022-03-31 ENCOUNTER — OFFICE VISIT (OUTPATIENT)
Dept: OTOLARYNGOLOGY | Facility: CLINIC | Age: 42
End: 2022-03-31
Payer: COMMERCIAL

## 2022-03-31 VITALS
BODY MASS INDEX: 30.62 KG/M2 | DIASTOLIC BLOOD PRESSURE: 74 MMHG | SYSTOLIC BLOOD PRESSURE: 122 MMHG | WEIGHT: 172.81 LBS | TEMPERATURE: 98 F

## 2022-03-31 DIAGNOSIS — H90.0 CONDUCTIVE HEARING LOSS, BILATERAL: Primary | ICD-10-CM

## 2022-03-31 PROCEDURE — 3074F SYST BP LT 130 MM HG: CPT | Mod: CPTII,S$GLB,, | Performed by: OTOLARYNGOLOGY

## 2022-03-31 PROCEDURE — 99214 OFFICE O/P EST MOD 30 MIN: CPT | Mod: 57,S$GLB,, | Performed by: OTOLARYNGOLOGY

## 2022-03-31 PROCEDURE — 99214 PR OFFICE/OUTPT VISIT, EST, LEVL IV, 30-39 MIN: ICD-10-PCS | Mod: 57,S$GLB,, | Performed by: OTOLARYNGOLOGY

## 2022-03-31 PROCEDURE — 1160F PR REVIEW ALL MEDS BY PRESCRIBER/CLIN PHARMACIST DOCUMENTED: ICD-10-PCS | Mod: CPTII,S$GLB,, | Performed by: OTOLARYNGOLOGY

## 2022-03-31 PROCEDURE — 3008F BODY MASS INDEX DOCD: CPT | Mod: CPTII,S$GLB,, | Performed by: OTOLARYNGOLOGY

## 2022-03-31 PROCEDURE — 3078F PR MOST RECENT DIASTOLIC BLOOD PRESSURE < 80 MM HG: ICD-10-PCS | Mod: CPTII,S$GLB,, | Performed by: OTOLARYNGOLOGY

## 2022-03-31 PROCEDURE — 99999 PR PBB SHADOW E&M-EST. PATIENT-LVL IV: ICD-10-PCS | Mod: PBBFAC,,, | Performed by: OTOLARYNGOLOGY

## 2022-03-31 PROCEDURE — 1159F MED LIST DOCD IN RCRD: CPT | Mod: CPTII,S$GLB,, | Performed by: OTOLARYNGOLOGY

## 2022-03-31 PROCEDURE — 99999 PR PBB SHADOW E&M-EST. PATIENT-LVL IV: CPT | Mod: PBBFAC,,, | Performed by: OTOLARYNGOLOGY

## 2022-03-31 PROCEDURE — 1160F RVW MEDS BY RX/DR IN RCRD: CPT | Mod: CPTII,S$GLB,, | Performed by: OTOLARYNGOLOGY

## 2022-03-31 PROCEDURE — 3078F DIAST BP <80 MM HG: CPT | Mod: CPTII,S$GLB,, | Performed by: OTOLARYNGOLOGY

## 2022-03-31 PROCEDURE — 1159F PR MEDICATION LIST DOCUMENTED IN MEDICAL RECORD: ICD-10-PCS | Mod: CPTII,S$GLB,, | Performed by: OTOLARYNGOLOGY

## 2022-03-31 PROCEDURE — 3074F PR MOST RECENT SYSTOLIC BLOOD PRESSURE < 130 MM HG: ICD-10-PCS | Mod: CPTII,S$GLB,, | Performed by: OTOLARYNGOLOGY

## 2022-03-31 PROCEDURE — 3008F PR BODY MASS INDEX (BMI) DOCUMENTED: ICD-10-PCS | Mod: CPTII,S$GLB,, | Performed by: OTOLARYNGOLOGY

## 2022-04-01 NOTE — PROGRESS NOTES
Subjective:       Patient ID: Duane Romero is a 41 y.o. female.    Chief Complaint: Hearing loss    Hearing Loss: No fever.         Duane Romero is a 41 y.o. 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  She also has associated bilateral conductive hearing loss worse in the left ear.  She has had a previous tympanoplasty 1 of her ears that did not take.  Previously we discussed options for hearing rehabilitation including hearing aids, revision tympanoplasty and BCIs    Review of Systems   Constitutional: Negative for chills and fever.   HENT: Positive for hearing loss. Negative for sore throat and trouble swallowing.    Respiratory: Negative for apnea and chest tightness.    Cardiovascular: Negative for chest pain.         Objective:      Physical Exam  Constitutional:       Appearance: She is well-developed.   HENT:      Head: Normocephalic and atraumatic.      Right Ear: Ear canal and external ear normal. Tympanic membrane is perforated.      Left Ear: Ear canal and external ear normal. Tympanic membrane is perforated.      Ears:        Comments: Binocular Microscopy  Indications: perforation, pre op planning  Details: binocular microscopy used to examine both ears  Findings  AD:  Anterior inferior perforation dry unable to see anterior margin due to small ear canal and anterior hump of the of ear  AS:       Nose: Nose normal.      Mouth/Throat:      Pharynx: Uvula midline.   Neck:      Thyroid: No thyroid mass.   Musculoskeletal:      Cervical back: Normal range of motion.         Assessment:       1. Conductive hearing loss, bilateral        Plan:         the patient's problem and treatment thereof.  In summary she has pleasant 40-year-old with bilateral marginal tympanic membrane perforations, narrow ear canals,  and history of prior failed tympanoplasty.    We discussed treatment options including traditional hearing aids, bone conducting hearing aids,  and revision tympanoplasty. Patient would prefer not to have another tympanoplasty and instead wants to proceed with bilateral BCIs.     We discussed the risks of surgery including, pain, bleeding, infection, scarring, device failure,     Patient wishes to proceed with Osia BCI system.  Will plan to do bilateral implants.      Answers for HPI/ROS submitted by the patient on 3/30/2022  Seasonal Allergies?: Yes

## 2022-04-20 ENCOUNTER — OFFICE VISIT (OUTPATIENT)
Dept: SURGERY | Facility: CLINIC | Age: 42
End: 2022-04-20
Payer: COMMERCIAL

## 2022-04-20 VITALS
DIASTOLIC BLOOD PRESSURE: 83 MMHG | TEMPERATURE: 97 F | BODY MASS INDEX: 29.18 KG/M2 | WEIGHT: 170 LBS | HEART RATE: 71 BPM | SYSTOLIC BLOOD PRESSURE: 132 MMHG

## 2022-04-20 DIAGNOSIS — K42.9 UMBILICAL HERNIA WITHOUT OBSTRUCTION AND WITHOUT GANGRENE: Primary | ICD-10-CM

## 2022-04-20 PROCEDURE — 99999 PR PBB SHADOW E&M-EST. PATIENT-LVL III: ICD-10-PCS | Mod: PBBFAC,,, | Performed by: SURGERY

## 2022-04-20 PROCEDURE — 3079F DIAST BP 80-89 MM HG: CPT | Mod: CPTII,S$GLB,, | Performed by: SURGERY

## 2022-04-20 PROCEDURE — 3008F BODY MASS INDEX DOCD: CPT | Mod: CPTII,S$GLB,, | Performed by: SURGERY

## 2022-04-20 PROCEDURE — 99204 OFFICE O/P NEW MOD 45 MIN: CPT | Mod: S$GLB,,, | Performed by: SURGERY

## 2022-04-20 PROCEDURE — 3079F PR MOST RECENT DIASTOLIC BLOOD PRESSURE 80-89 MM HG: ICD-10-PCS | Mod: CPTII,S$GLB,, | Performed by: SURGERY

## 2022-04-20 PROCEDURE — 3075F SYST BP GE 130 - 139MM HG: CPT | Mod: CPTII,S$GLB,, | Performed by: SURGERY

## 2022-04-20 PROCEDURE — 1159F PR MEDICATION LIST DOCUMENTED IN MEDICAL RECORD: ICD-10-PCS | Mod: CPTII,S$GLB,, | Performed by: SURGERY

## 2022-04-20 PROCEDURE — 1160F PR REVIEW ALL MEDS BY PRESCRIBER/CLIN PHARMACIST DOCUMENTED: ICD-10-PCS | Mod: CPTII,S$GLB,, | Performed by: SURGERY

## 2022-04-20 PROCEDURE — 1160F RVW MEDS BY RX/DR IN RCRD: CPT | Mod: CPTII,S$GLB,, | Performed by: SURGERY

## 2022-04-20 PROCEDURE — 1159F MED LIST DOCD IN RCRD: CPT | Mod: CPTII,S$GLB,, | Performed by: SURGERY

## 2022-04-20 PROCEDURE — 3008F PR BODY MASS INDEX (BMI) DOCUMENTED: ICD-10-PCS | Mod: CPTII,S$GLB,, | Performed by: SURGERY

## 2022-04-20 PROCEDURE — 99204 PR OFFICE/OUTPT VISIT, NEW, LEVL IV, 45-59 MIN: ICD-10-PCS | Mod: S$GLB,,, | Performed by: SURGERY

## 2022-04-20 PROCEDURE — 3075F PR MOST RECENT SYSTOLIC BLOOD PRESS GE 130-139MM HG: ICD-10-PCS | Mod: CPTII,S$GLB,, | Performed by: SURGERY

## 2022-04-20 PROCEDURE — 99999 PR PBB SHADOW E&M-EST. PATIENT-LVL III: CPT | Mod: PBBFAC,,, | Performed by: SURGERY

## 2022-04-27 ENCOUNTER — TELEPHONE (OUTPATIENT)
Dept: SURGERY | Facility: CLINIC | Age: 42
End: 2022-04-27
Payer: COMMERCIAL

## 2022-04-27 NOTE — TELEPHONE ENCOUNTER
----- Message from Maryjo Diop MA sent at 4/26/2022  5:04 PM CDT -----    ----- Message -----  From: Keena Clemons  Sent: 4/26/2022   4:46 PM CDT  To: [facility] Staff    Patient Call Back    Who Called: PT     What is the request in detail: Pt calling to speak with someone regarding her having the surgery scheduled at the same time as she is getting her hearing aids implanted. The pt just want to make arrangements regarding both of the surgeries if they can be both done at the same time. Pls advise.    Can the clinic reply by [facility]?    Best Call Back Number:[phone]

## 2022-05-02 NOTE — PROGRESS NOTES
History & Physical    SUBJECTIVE:     History of Present Illness:  Patient is a 41 y.o. female referred for hernia repair.  She reports a bulge at at her umbilicus.  She denies any severe pain but reports it is uncomfortable when pressed upon at times.  She is going to be undergoing ENT surgery soon as well.    No chief complaint on file.      Review of patient's allergies indicates:   Allergen Reactions    Pcn [penicillins] Itching     Injection only.  Can tolerate oral form       Current Outpatient Medications   Medication Sig Dispense Refill    clobetasoL (TEMOVATE) 0.05 % cream Apply to ear canals twice daily for one week as needed. 15 g 0    fluticasone propionate (FLONASE) 50 mcg/actuation nasal spray 2 sprays (100 mcg total) by Each Nostril route once daily. 16 g 12    multivitamin,tx-minerals Tab Take by mouth once daily.      ofloxacin (FLOXIN) 0.3 % otic solution Place 5 drops into both ears 2 (two) times daily. (Patient not taking: No sig reported) 10 mL 0    sulfacetamide sodium 10% (BLEPH-10) 10 % ophthalmic solution Apply 3-4 drops to the right ear twice daily for 7 days. (Patient not taking: No sig reported) 5 mL 0    triamcinolone acetonide 0.025% (KENALOG) 0.025 % cream Apply topically 2 (two) times daily. (Patient not taking: Reported on 2022) 15 g 2     No current facility-administered medications for this visit.       Past Medical History:   Diagnosis Date    Allergy     Asthma     Hyperlipidemia     Otitis media     Prediabetes     Sinusitis     Type A insulin resistance      Past Surgical History:   Procedure Laterality Date    ADENOIDECTOMY       SECTION      CYST REMOVAL Right     Under right arm    DILATION AND CURETTAGE OF UTERUS      HYSTERECTOMY      TONSILLECTOMY      TYMPANOPLASTY      TYMPANOSTOMY TUBE PLACEMENT       Family History   Problem Relation Age of Onset    Cancer Maternal Aunt         Skin cancer    Thyroid disease Maternal Grandmother      Hyperlipidemia Mother     Diabetes Father         DM2 insulin pump & oral    Hypertension Father     Obesity Father     Migraines Neg Hx     Asthma Neg Hx      Social History     Tobacco Use    Smoking status: Never Smoker    Smokeless tobacco: Never Used   Substance Use Topics    Alcohol use: Not Currently    Drug use: Never        Review of Systems:  Review of Systems   Constitutional: Negative for activity change, chills, fatigue, fever and unexpected weight change.   HENT: Positive for hearing loss and rhinorrhea. Negative for trouble swallowing.    Eyes: Negative for discharge and visual disturbance.   Respiratory: Negative for cough, chest tightness, shortness of breath, wheezing and stridor.    Cardiovascular: Negative for chest pain, palpitations and leg swelling.   Gastrointestinal: Negative for abdominal distention, abdominal pain, blood in stool, constipation, diarrhea, nausea and vomiting.   Endocrine: Negative for polydipsia and polyuria.   Genitourinary: Negative for difficulty urinating, dysuria, frequency, hematuria, menstrual problem and urgency.   Musculoskeletal: Negative for arthralgias, joint swelling and neck pain.   Skin: Negative for color change, pallor, rash and wound.   Neurological: Positive for headaches. Negative for weakness.   Hematological: Does not bruise/bleed easily.   Psychiatric/Behavioral: Negative for confusion and dysphoric mood.       OBJECTIVE:     Vital Signs (Most Recent)  Temp: 97.2 °F (36.2 °C) (04/20/22 1509)  Pulse: 71 (04/20/22 1509)  BP: 132/83 (04/20/22 1509)     77.1 kg (169 lb 15.6 oz)     Physical Exam:  Physical Exam  Vitals reviewed.   Constitutional:       Appearance: She is well-developed.   HENT:      Head: Normocephalic and atraumatic.   Cardiovascular:      Rate and Rhythm: Normal rate and regular rhythm.   Pulmonary:      Effort: Pulmonary effort is normal.      Breath sounds: Normal breath sounds.   Abdominal:      General: Bowel sounds are  normal. There is no distension.      Palpations: Abdomen is soft.      Tenderness: There is no abdominal tenderness.      Hernia: A hernia (Umbilical reducible) is present.   Musculoskeletal:      Cervical back: Neck supple.   Skin:     General: Skin is warm and dry.   Neurological:      Mental Status: She is alert and oriented to person, place, and time.           ASSESSMENT/PLAN:     41-year-old female with umbilical hernia    PLAN:Plan     Umbilical hernia repair possible mesh; date to be determined based on patient's scheduled surgery  Preop: CBC, BMP, EKG  Risks and benefits discussed with patient including: pain, bleeding, infection, injury to underlying organs, recurrence, need for further procedure

## 2022-05-12 ENCOUNTER — TELEPHONE (OUTPATIENT)
Dept: SURGERY | Facility: CLINIC | Age: 42
End: 2022-05-12
Payer: COMMERCIAL

## 2022-05-12 NOTE — TELEPHONE ENCOUNTER
Returned call to patient.. She is calling to inquire about scheduling surgery. Patient advised that a message forwarded to Zoe

## 2022-05-12 NOTE — TELEPHONE ENCOUNTER
----- Message from Dominique Webster sent at 5/12/2022  3:24 PM CDT -----  Regarding: Surgery scheduling  Contact: Patient  Patient would like a call back concerning  scheduling her surgery at  [phone] (home) [phone] (work)

## 2022-05-16 ENCOUNTER — TELEPHONE (OUTPATIENT)
Dept: SURGERY | Facility: CLINIC | Age: 42
End: 2022-05-16
Payer: COMMERCIAL

## 2022-05-16 NOTE — TELEPHONE ENCOUNTER
Returned call to patient. She would like to schedule her surgery for 7/8/2022. Advised that I would forward Dr Paiz a message to see if that date is available. Understanding verbalized.

## 2022-05-16 NOTE — TELEPHONE ENCOUNTER
----- Message from Lucila Hernandez sent at 5/16/2022  3:37 PM CDT -----  .Type:  Needs Medical Advice    Who Called: NANCY RODRIGUEZ [[id number]]  Symptoms (please be specific):   How long has patient had these symptoms:   Pharmacy name and phone #:    Would the patient rather a call back or a response via [facility]?   Best Call Back Number:  [phone]  Additional Information: Pt is requesting a call from office regarding her surgery.Please call

## 2022-05-17 DIAGNOSIS — K42.9 UMBILICAL HERNIA: ICD-10-CM

## 2022-05-17 DIAGNOSIS — K42.9 UMBILICAL HERNIA WITHOUT OBSTRUCTION AND WITHOUT GANGRENE: Primary | ICD-10-CM

## 2022-05-17 RX ORDER — SODIUM CHLORIDE 9 MG/ML
INJECTION, SOLUTION INTRAVENOUS CONTINUOUS
Status: CANCELLED | OUTPATIENT
Start: 2022-05-17

## 2022-05-17 RX ORDER — LIDOCAINE HYDROCHLORIDE 10 MG/ML
1 INJECTION, SOLUTION EPIDURAL; INFILTRATION; INTRACAUDAL; PERINEURAL ONCE
Status: CANCELLED | OUTPATIENT
Start: 2022-05-17 | End: 2022-05-17

## 2022-05-18 ENCOUNTER — TELEPHONE (OUTPATIENT)
Dept: SURGERY | Facility: CLINIC | Age: 42
End: 2022-05-18
Payer: COMMERCIAL

## 2022-05-18 NOTE — TELEPHONE ENCOUNTER
----- Message from Philipp Paiz MD sent at 5/17/2022  6:08 PM CDT -----  Will place case request for 6/24.  Thanks  ----- Message -----  From: Shreya Slaughter MA  Sent: 5/17/2022   8:36 AM CDT  To: Philipp Paiz MD    She can do 6/23, 6/24,7/14 or 7/15  ----- Message -----  From: Philipp Paiz MD  Sent: 5/16/2022   8:06 PM CDT  To: Shreya Slaughter MA    I will be out of town that week. I could do the surgery the week before or after but no availability that week.  ----- Message -----  From: Shreya Slaughter MA  Sent: 5/16/2022   3:57 PM CDT  To: Philipp Paiz MD    Patient wants to know if her hernia repair can be scheduled on 7/8/2022. Please advise  ----- Message -----  From: Lucila Hernandez  Sent: 5/16/2022   3:41 PM CDT  To: [facility] Staff    .Type:  Needs Medical Advice    Who Called: NANCY RODRIGUEZ [[id number]]  Symptoms (please be specific):   How long has patient had these symptoms:   Pharmacy name and phone #:    Would the patient rather a call back or a response via [facility]?   Best Call Back Number:  [phone]  Additional Information: Pt is requesting a call from office regarding her surgery.Please call

## 2022-05-24 ENCOUNTER — CLINICAL SUPPORT (OUTPATIENT)
Dept: AUDIOLOGY | Facility: CLINIC | Age: 42
End: 2022-05-24
Payer: COMMERCIAL

## 2022-05-24 DIAGNOSIS — Z71.89 HEARING AID CONSULTATION: Primary | ICD-10-CM

## 2022-05-25 NOTE — PROGRESS NOTES
Duane Romero was seen 05/24/2022 for a Bone Anchored Hearing Aid consult to discuss Osia and complete new purchase agreement.  Surgery is scheduled for 6/30/22. 2 Osia processors will be ordered, along with surgical supplies and mini john and TV streamer. The [employer] Osia purchase agreement was discussed and $350.00 fitting/programming fee agreed.    I will see her 4-6 weeks post surgery for Osia fitting.

## 2022-05-31 ENCOUNTER — OFFICE VISIT (OUTPATIENT)
Dept: ALLERGY | Facility: CLINIC | Age: 42
End: 2022-05-31
Payer: COMMERCIAL

## 2022-05-31 ENCOUNTER — TELEPHONE (OUTPATIENT)
Dept: ALLERGY | Facility: CLINIC | Age: 42
End: 2022-05-31
Payer: COMMERCIAL

## 2022-05-31 VITALS
HEART RATE: 56 BPM | HEIGHT: 63 IN | TEMPERATURE: 98 F | DIASTOLIC BLOOD PRESSURE: 68 MMHG | SYSTOLIC BLOOD PRESSURE: 121 MMHG | BODY MASS INDEX: 29.64 KG/M2 | WEIGHT: 167.31 LBS

## 2022-05-31 DIAGNOSIS — J30.1 SEASONAL ALLERGIC RHINITIS DUE TO POLLEN: Primary | ICD-10-CM

## 2022-05-31 DIAGNOSIS — J30.89 ALLERGIC RHINITIS DUE TO AMERICAN HOUSE DUST MITE: ICD-10-CM

## 2022-05-31 DIAGNOSIS — J45.20 MILD INTERMITTENT ASTHMA WITHOUT COMPLICATION: ICD-10-CM

## 2022-05-31 PROCEDURE — 3078F PR MOST RECENT DIASTOLIC BLOOD PRESSURE < 80 MM HG: ICD-10-PCS | Mod: CPTII,S$GLB,, | Performed by: ALLERGY & IMMUNOLOGY

## 2022-05-31 PROCEDURE — 3008F BODY MASS INDEX DOCD: CPT | Mod: CPTII,S$GLB,, | Performed by: ALLERGY & IMMUNOLOGY

## 2022-05-31 PROCEDURE — 99204 PR OFFICE/OUTPT VISIT, NEW, LEVL IV, 45-59 MIN: ICD-10-PCS | Mod: 25,S$GLB,, | Performed by: ALLERGY & IMMUNOLOGY

## 2022-05-31 PROCEDURE — 99999 PR PBB SHADOW E&M-EST. PATIENT-LVL IV: ICD-10-PCS | Mod: PBBFAC,,, | Performed by: ALLERGY & IMMUNOLOGY

## 2022-05-31 PROCEDURE — 3078F DIAST BP <80 MM HG: CPT | Mod: CPTII,S$GLB,, | Performed by: ALLERGY & IMMUNOLOGY

## 2022-05-31 PROCEDURE — 95004 PERQ TESTS W/ALRGNC XTRCS: CPT | Mod: S$GLB,,, | Performed by: ALLERGY & IMMUNOLOGY

## 2022-05-31 PROCEDURE — 1159F PR MEDICATION LIST DOCUMENTED IN MEDICAL RECORD: ICD-10-PCS | Mod: CPTII,S$GLB,, | Performed by: ALLERGY & IMMUNOLOGY

## 2022-05-31 PROCEDURE — 99999 PR PBB SHADOW E&M-EST. PATIENT-LVL IV: CPT | Mod: PBBFAC,,, | Performed by: ALLERGY & IMMUNOLOGY

## 2022-05-31 PROCEDURE — 3008F PR BODY MASS INDEX (BMI) DOCUMENTED: ICD-10-PCS | Mod: CPTII,S$GLB,, | Performed by: ALLERGY & IMMUNOLOGY

## 2022-05-31 PROCEDURE — 3074F SYST BP LT 130 MM HG: CPT | Mod: CPTII,S$GLB,, | Performed by: ALLERGY & IMMUNOLOGY

## 2022-05-31 PROCEDURE — 1159F MED LIST DOCD IN RCRD: CPT | Mod: CPTII,S$GLB,, | Performed by: ALLERGY & IMMUNOLOGY

## 2022-05-31 PROCEDURE — 99204 OFFICE O/P NEW MOD 45 MIN: CPT | Mod: 25,S$GLB,, | Performed by: ALLERGY & IMMUNOLOGY

## 2022-05-31 PROCEDURE — 3074F PR MOST RECENT SYSTOLIC BLOOD PRESSURE < 130 MM HG: ICD-10-PCS | Mod: CPTII,S$GLB,, | Performed by: ALLERGY & IMMUNOLOGY

## 2022-05-31 PROCEDURE — 95004 PR ALLERGY SKIN TESTS,ALLERGENS: ICD-10-PCS | Mod: S$GLB,,, | Performed by: ALLERGY & IMMUNOLOGY

## 2022-05-31 RX ORDER — EPINEPHRINE 0.3 MG/.3ML
1 INJECTION SUBCUTANEOUS ONCE
Qty: 2 EACH | Refills: 11 | Status: SHIPPED | OUTPATIENT
Start: 2022-05-31 | End: 2022-11-22

## 2022-05-31 RX ORDER — CETIRIZINE HYDROCHLORIDE 10 MG/1
10 TABLET ORAL
Status: COMPLETED | OUTPATIENT
Start: 2022-05-31 | End: 2022-05-31

## 2022-05-31 RX ORDER — EPINEPHRINE 0.3 MG/.3ML
1 INJECTION SUBCUTANEOUS ONCE
Qty: 2 EACH | Refills: 11 | Status: SHIPPED | OUTPATIENT
Start: 2022-05-31 | End: 2022-05-31 | Stop reason: SDUPTHER

## 2022-05-31 RX ADMIN — CETIRIZINE HYDROCHLORIDE 10 MG: 10 TABLET ORAL at 10:05

## 2022-05-31 NOTE — TELEPHONE ENCOUNTER
----- Message from Sherri Mar sent at 5/31/2022  4:44 PM CDT -----  [facility] regarding prescription for the pt. The prescription have Epipen on it and it need to be changed. She was speaking to Partha earlier regarding this issue. Call back number is [phone] and fax is [phone]. Thx EL

## 2022-05-31 NOTE — TELEPHONE ENCOUNTER
----- Message from Melania Hickman sent at 5/31/2022  3:24 PM CDT -----  Contact: [phone]  Good Afternoon  Rx is 55.00 for EPINEPHrine (EPIPEN) 0.3 mg/0.3 mL AtIn. Patient would like another Rx.    Thank you so much

## 2022-05-31 NOTE — PROGRESS NOTES
"Subjective:       Patient ID: Duane Romero is a 41 y.o. female.    Chief Complaint:  Asthma      HPI:  41 year old female wanting to establish care. She is interested in restarting allergy immunotherapy. She lives in [place]. She stopped allergy immunotherapy several years ago. She reports being on allergy immunotherapy intermittently for 30 years.   Symptoms: runny nose, nasal congestion, headaches, earaches, sore throat  "I am borderline asthmatic".  Last used albuterol several years ago  NO oral steroids of steroid injection  Wheezing and shortness of breath in the Fall and Spring  Medications: Flonase daily. Alternates Claritin and Allegra     She is Kristina Quintero's MA cousin    Past Medical History:   Diagnosis Date    Allergy     Asthma     Hyperlipidemia     Otitis media     Prediabetes     Sinusitis     Type A insulin resistance      Family History   Problem Relation Age of Onset    Cancer Maternal Aunt         Skin cancer    Thyroid disease Maternal Grandmother     Hyperlipidemia Mother     Diabetes Father         DM2 insulin pump & oral    Hypertension Father     Obesity Father     Migraines Neg Hx     Asthma Neg Hx      Current Outpatient Medications on File Prior to Visit   Medication Sig Dispense Refill    clobetasoL (TEMOVATE) 0.05 % cream Apply to ear canals twice daily for one week as needed. 15 g 0    fluticasone propionate (FLONASE) 50 mcg/actuation nasal spray 2 sprays (100 mcg total) by Each Nostril route once daily. 16 g 12    multivitamin,tx-minerals Tab Take by mouth once daily.      ofloxacin (FLOXIN) 0.3 % otic solution Place 5 drops into both ears 2 (two) times daily. (Patient not taking: No sig reported) 10 mL 0    sulfacetamide sodium 10% (BLEPH-10) 10 % ophthalmic solution Apply 3-4 drops to the right ear twice daily for 7 days. (Patient not taking: No sig reported) 5 mL 0    triamcinolone acetonide 0.025% (KENALOG) 0.025 % cream Apply topically 2 (two) times " daily. (Patient not taking: No sig reported) 15 g 2     No current facility-administered medications on file prior to visit.       Review of patient's allergies indicates:   Allergen Reactions    Paraffin     Pcn [penicillins] Itching     Injection only.  Can tolerate oral form    Prednisone      Other reaction(s): increased heart rate       Environmental History: Pets in the home: dogs (1). She does not smoke.  Review of Systems   Constitutional: Negative for chills and fever.   HENT: Positive for congestion, ear pain, postnasal drip and rhinorrhea.    Eyes: Positive for discharge and itching.   Respiratory: Negative for chest tightness, shortness of breath and wheezing.    Cardiovascular: Negative for chest pain and leg swelling.   Gastrointestinal: Negative for nausea and vomiting.   Skin: Positive for rash. Negative for wound.   Allergic/Immunologic: Positive for environmental allergies. Negative for food allergies and immunocompromised state.   Neurological: Negative for facial asymmetry and speech difficulty.   Hematological: Negative for adenopathy. Does not bruise/bleed easily.   Psychiatric/Behavioral: Negative for behavioral problems and suicidal ideas.        Objective:    Physical Exam  Vitals reviewed.   Constitutional:       General: She is not in acute distress.     Appearance: Normal appearance. She is well-developed. She is not ill-appearing, toxic-appearing or diaphoretic.   HENT:      Head: Normocephalic and atraumatic.      Right Ear: Tympanic membrane, ear canal and external ear normal. There is no impacted cerumen.      Left Ear: Tympanic membrane, ear canal and external ear normal. There is no impacted cerumen.      Nose: Nose normal. No congestion or rhinorrhea.      Mouth/Throat:      Pharynx: No oropharyngeal exudate or posterior oropharyngeal erythema.   Eyes:      General: No scleral icterus.        Right eye: No discharge.         Left eye: No discharge.      Pupils: Pupils are equal,  round, and reactive to light.   Neck:      Thyroid: No thyromegaly.   Cardiovascular:      Rate and Rhythm: Normal rate and regular rhythm.      Heart sounds: Normal heart sounds. No murmur heard.    No friction rub. No gallop.   Pulmonary:      Effort: Pulmonary effort is normal. No respiratory distress.      Breath sounds: Normal breath sounds. No stridor. No wheezing, rhonchi or rales.   Chest:      Chest wall: No tenderness.   Abdominal:      General: Bowel sounds are normal. There is no distension.      Palpations: Abdomen is soft. There is no mass.      Tenderness: There is no abdominal tenderness. There is no guarding or rebound.      Hernia: No hernia is present.   Musculoskeletal:         General: No swelling, tenderness, deformity or signs of injury. Normal range of motion.      Cervical back: Normal range of motion and neck supple. No rigidity or tenderness. No muscular tenderness.      Right lower leg: No edema.      Left lower leg: No edema.   Lymphadenopathy:      Cervical: No cervical adenopathy.   Skin:     General: Skin is warm.      Coloration: Skin is not jaundiced or pale.      Findings: No bruising or erythema.   Neurological:      Mental Status: She is alert and oriented to person, place, and time.      Gait: Gait normal.   Psychiatric:         Mood and Affect: Mood normal.         Behavior: Behavior normal.         Thought Content: Thought content normal.         Judgment: Judgment normal.     Allergy Skin Prick testing  Histamine 6X6, 10 X11  Saline 2X2  She had an appropriate response to positive and negative controls  She had a positive response to the allergens listed in bold and negative to allergens not listed in bold:  Cat, Dog, Dust mite(F)- 10 X5, 15 X11  Dust mite (Pt)- 6X5, 15X10, Cockroach, Alternaria, Aspergillus, Helminthosporium, Bald [place], [place], Elm, [place], Ligustrum, Oak, Pecan, Red Cedar, [place], [place], [place], Loco, Red Top/Agrostis Alba- 5X5, 15 X15,  5X5,  15 X15, Manpreet 5X6, 15 X15, English Plantain, Marsh Elder, Pigweed, Ragweed, Russian Thistle, Curvularia/Drechsiera Spicifera, Epicoccum, Fusarium, Hormodendrum/Cladosporium, Penicillium, Monilia/candida, Phoma, Stemphylium      Assessment:       1. Seasonal allergic rhinitis due to pollen    2. Allergic rhinitis due to American house dust mite    3. Mild intermittent asthma without complication         Plan:       Allergy testing was significant for dust mites and grass pollen.  Risk and benefits of allergy immunotherapy were explained. Consent signed. Witness present. Patient verbalizes understanding. No barriers in communication.        Seasonal allergic rhinitis due to pollen  -     cetirizine tablet 10 mg  -     Allergy Mix; Inject into the skin once. Extract 1 of 1  Dust mite mix- 1ml  Gras mix- 1ml  Allergens 2 mls  Diluent 3mls  Total 5mls  Please dilute from 1:10,000 to 1:1 in 10 fold dilutions  Consent has been signed. Injections will be given at the [facility].  She must wait 30 minutes after each injection in the office and have her Epipen 2 pack.  Starting dose 0.1ml of 1:10,000 dilution  Maintenance dose 0.5ml of 1:1  Progress per protocol. for 1 dose  Dispense: 10 mL; Refill: 11    Allergic rhinitis due to American house dust mite  -     cetirizine tablet 10 mg  -     Allergy Mix; Inject into the skin once. Extract 1 of 1  Dust mite mix- 1ml  Gras mix- 1ml  Allergens 2 mls  Diluent 3mls  Total 5mls  Please dilute from 1:10,000 to 1:1 in 10 fold dilutions  Consent has been signed. Injections will be given at the [facility].  She must wait 30 minutes after each injection in the office and have her Epipen 2 pack.  Starting dose 0.1ml of 1:10,000 dilution  Maintenance dose 0.5ml of 1:1  Progress per protocol. for 1 dose  Dispense: 10 mL; Refill: 11    Mild intermittent asthma without complication  -     Allergy Mix; Inject into the skin once. Extract 1 of 1  Dust mite mix- 1ml  Gras mix- 1ml  Allergens 2  mls  Diluent 3mls  Total 5mls  Please dilute from 1:10,000 to 1:1 in 10 fold dilutions  Consent has been signed. Injections will be given at the [facility].  She must wait 30 minutes after each injection in the office and have her Epipen 2 pack.  Starting dose 0.1ml of 1:10,000 dilution  Maintenance dose 0.5ml of 1:1  Progress per protocol. for 1 dose  Dispense: 10 mL; Refill: 11    Other orders  -     Discontinue: EPINEPHrine (EPIPEN) 0.3 mg/0.3 mL AtIn; Inject 0.3 mLs (0.3 mg total) into the muscle once. for 1 dose  Dispense: 2 each; Refill: 11    Auviq was ordered.    RTC 6 months or sooner, if needed    BRYCE PARADA spent a total of 45 minutes on the day of the visit.  This includes face to face time and non-face to face time preparing to see the patient (eg, review of tests), obtaining and/or reviewing separately obtained history, documenting clinical information in the electronic or other health record, independently interpreting results and communicating results to the patient/family/caregiver, or care coordinator.

## 2022-06-01 ENCOUNTER — PATIENT MESSAGE (OUTPATIENT)
Dept: ALLERGY | Facility: CLINIC | Age: 42
End: 2022-06-01
Payer: COMMERCIAL

## 2022-06-03 ENCOUNTER — OFFICE VISIT (OUTPATIENT)
Dept: INTERNAL MEDICINE | Facility: CLINIC | Age: 42
End: 2022-06-03
Payer: COMMERCIAL

## 2022-06-03 VITALS
TEMPERATURE: 98 F | SYSTOLIC BLOOD PRESSURE: 114 MMHG | HEIGHT: 63 IN | WEIGHT: 169.56 LBS | HEART RATE: 64 BPM | OXYGEN SATURATION: 98 % | DIASTOLIC BLOOD PRESSURE: 78 MMHG | BODY MASS INDEX: 30.04 KG/M2

## 2022-06-03 DIAGNOSIS — Z00.00 ROUTINE ADULT HEALTH MAINTENANCE: Primary | ICD-10-CM

## 2022-06-03 PROCEDURE — 99396 PR PREVENTIVE VISIT,EST,40-64: ICD-10-PCS | Mod: S$GLB,,, | Performed by: FAMILY MEDICINE

## 2022-06-03 PROCEDURE — 1159F PR MEDICATION LIST DOCUMENTED IN MEDICAL RECORD: ICD-10-PCS | Mod: CPTII,S$GLB,, | Performed by: FAMILY MEDICINE

## 2022-06-03 PROCEDURE — 3074F PR MOST RECENT SYSTOLIC BLOOD PRESSURE < 130 MM HG: ICD-10-PCS | Mod: CPTII,S$GLB,, | Performed by: FAMILY MEDICINE

## 2022-06-03 PROCEDURE — 3008F PR BODY MASS INDEX (BMI) DOCUMENTED: ICD-10-PCS | Mod: CPTII,S$GLB,, | Performed by: FAMILY MEDICINE

## 2022-06-03 PROCEDURE — 99396 PREV VISIT EST AGE 40-64: CPT | Mod: S$GLB,,, | Performed by: FAMILY MEDICINE

## 2022-06-03 PROCEDURE — 99999 PR PBB SHADOW E&M-EST. PATIENT-LVL IV: CPT | Mod: PBBFAC,,, | Performed by: FAMILY MEDICINE

## 2022-06-03 PROCEDURE — 3074F SYST BP LT 130 MM HG: CPT | Mod: CPTII,S$GLB,, | Performed by: FAMILY MEDICINE

## 2022-06-03 PROCEDURE — 99999 PR PBB SHADOW E&M-EST. PATIENT-LVL IV: ICD-10-PCS | Mod: PBBFAC,,, | Performed by: FAMILY MEDICINE

## 2022-06-03 PROCEDURE — 3078F PR MOST RECENT DIASTOLIC BLOOD PRESSURE < 80 MM HG: ICD-10-PCS | Mod: CPTII,S$GLB,, | Performed by: FAMILY MEDICINE

## 2022-06-03 PROCEDURE — 1159F MED LIST DOCD IN RCRD: CPT | Mod: CPTII,S$GLB,, | Performed by: FAMILY MEDICINE

## 2022-06-03 PROCEDURE — 3008F BODY MASS INDEX DOCD: CPT | Mod: CPTII,S$GLB,, | Performed by: FAMILY MEDICINE

## 2022-06-03 PROCEDURE — 3078F DIAST BP <80 MM HG: CPT | Mod: CPTII,S$GLB,, | Performed by: FAMILY MEDICINE

## 2022-06-06 ENCOUNTER — LAB VISIT (OUTPATIENT)
Dept: LAB | Facility: HOSPITAL | Age: 42
End: 2022-06-06
Attending: FAMILY MEDICINE
Payer: COMMERCIAL

## 2022-06-06 ENCOUNTER — TELEPHONE (OUTPATIENT)
Dept: OTOLARYNGOLOGY | Facility: CLINIC | Age: 42
End: 2022-06-06
Payer: COMMERCIAL

## 2022-06-06 DIAGNOSIS — Z00.00 ROUTINE ADULT HEALTH MAINTENANCE: ICD-10-CM

## 2022-06-06 PROCEDURE — 83036 HEMOGLOBIN GLYCOSYLATED A1C: CPT | Performed by: FAMILY MEDICINE

## 2022-06-06 PROCEDURE — 85025 COMPLETE CBC W/AUTO DIFF WBC: CPT | Performed by: FAMILY MEDICINE

## 2022-06-06 PROCEDURE — 36415 COLL VENOUS BLD VENIPUNCTURE: CPT | Mod: PO | Performed by: FAMILY MEDICINE

## 2022-06-06 PROCEDURE — 84443 ASSAY THYROID STIM HORMONE: CPT | Performed by: FAMILY MEDICINE

## 2022-06-06 PROCEDURE — 80053 COMPREHEN METABOLIC PANEL: CPT | Performed by: FAMILY MEDICINE

## 2022-06-06 PROCEDURE — 80061 LIPID PANEL: CPT | Performed by: FAMILY MEDICINE

## 2022-06-06 NOTE — TELEPHONE ENCOUNTER
----- Message from Yuliana Morales MA sent at 6/6/2022  9:14 AM CDT -----  Regarding: FW: surgery time  Contact: pt    ----- Message -----  From: Kim Cancino  Sent: 6/6/2022   8:24 AM CDT  To: Master Jeff Staff  Subject: surgery time                                     Pt asking if she can change surgery time to earlier even if she has to change locations. Pt can be reached at [phone]

## 2022-06-06 NOTE — TELEPHONE ENCOUNTER
Spoke with pt regarding earlier surgery time. Advised Dr Kelley only in [place] on Thursday and operates in afternoon. Pt stated she wants to have surgery done in [facility] area for earlier time. Sent message to Master staff advising.

## 2022-06-07 LAB
ALBUMIN SERPL BCP-MCNC: 3.7 G/DL (ref 3.5–5.2)
ALP SERPL-CCNC: 53 U/L (ref 55–135)
ALT SERPL W/O P-5'-P-CCNC: 21 U/L (ref 10–44)
ANION GAP SERPL CALC-SCNC: 8 MMOL/L (ref 8–16)
AST SERPL-CCNC: 15 U/L (ref 10–40)
BASOPHILS # BLD AUTO: 0.01 K/UL (ref 0–0.2)
BASOPHILS NFR BLD: 0.2 % (ref 0–1.9)
BILIRUB SERPL-MCNC: 0.5 MG/DL (ref 0.1–1)
BUN SERPL-MCNC: 19 MG/DL (ref 6–20)
CALCIUM SERPL-MCNC: 8.8 MG/DL (ref 8.7–10.5)
CHLORIDE SERPL-SCNC: 108 MMOL/L (ref 95–110)
CHOLEST SERPL-MCNC: 174 MG/DL (ref 120–199)
CHOLEST/HDLC SERPL: 3.5 {RATIO} (ref 2–5)
CO2 SERPL-SCNC: 24 MMOL/L (ref 23–29)
CREAT SERPL-MCNC: 0.6 MG/DL (ref 0.5–1.4)
DIFFERENTIAL METHOD: ABNORMAL
EOSINOPHIL # BLD AUTO: 0.1 K/UL (ref 0–0.5)
EOSINOPHIL NFR BLD: 1.1 % (ref 0–8)
ERYTHROCYTE [DISTWIDTH] IN BLOOD BY AUTOMATED COUNT: 13.8 % (ref 11.5–14.5)
EST. GFR  (AFRICAN AMERICAN): >60 ML/MIN/1.73 M^2
EST. GFR  (NON AFRICAN AMERICAN): >60 ML/MIN/1.73 M^2
ESTIMATED AVG GLUCOSE: 103 MG/DL (ref 68–131)
GLUCOSE SERPL-MCNC: 106 MG/DL (ref 70–110)
HBA1C MFR BLD: 5.2 % (ref 4–5.6)
HCT VFR BLD AUTO: 44.8 % (ref 37–48.5)
HDLC SERPL-MCNC: 50 MG/DL (ref 40–75)
HDLC SERPL: 28.7 % (ref 20–50)
HGB BLD-MCNC: 14.2 G/DL (ref 12–16)
IMM GRANULOCYTES # BLD AUTO: 0 K/UL (ref 0–0.04)
IMM GRANULOCYTES NFR BLD AUTO: 0 % (ref 0–0.5)
LDLC SERPL CALC-MCNC: 114.4 MG/DL (ref 63–159)
LYMPHOCYTES # BLD AUTO: 1.1 K/UL (ref 1–4.8)
LYMPHOCYTES NFR BLD: 25.3 % (ref 18–48)
MCH RBC QN AUTO: 30.7 PG (ref 27–31)
MCHC RBC AUTO-ENTMCNC: 31.7 G/DL (ref 32–36)
MCV RBC AUTO: 97 FL (ref 82–98)
MONOCYTES # BLD AUTO: 0.3 K/UL (ref 0.3–1)
MONOCYTES NFR BLD: 7.5 % (ref 4–15)
NEUTROPHILS # BLD AUTO: 2.9 K/UL (ref 1.8–7.7)
NEUTROPHILS NFR BLD: 65.9 % (ref 38–73)
NONHDLC SERPL-MCNC: 124 MG/DL
NRBC BLD-RTO: 0 /100 WBC
PLATELET # BLD AUTO: 224 K/UL (ref 150–450)
PMV BLD AUTO: 10.3 FL (ref 9.2–12.9)
POTASSIUM SERPL-SCNC: 4.5 MMOL/L (ref 3.5–5.1)
PROT SERPL-MCNC: 5.8 G/DL (ref 6–8.4)
RBC # BLD AUTO: 4.62 M/UL (ref 4–5.4)
SODIUM SERPL-SCNC: 140 MMOL/L (ref 136–145)
TRIGL SERPL-MCNC: 48 MG/DL (ref 30–150)
TSH SERPL DL<=0.005 MIU/L-ACNC: 1.06 UIU/ML (ref 0.4–4)
WBC # BLD AUTO: 4.38 K/UL (ref 3.9–12.7)

## 2022-06-17 ENCOUNTER — DOCUMENTATION ONLY (OUTPATIENT)
Dept: PREADMISSION TESTING | Facility: HOSPITAL | Age: 42
End: 2022-06-17
Payer: COMMERCIAL

## 2022-06-20 ENCOUNTER — PATIENT MESSAGE (OUTPATIENT)
Dept: ALLERGY | Facility: CLINIC | Age: 42
End: 2022-06-20
Payer: COMMERCIAL

## 2022-06-21 ENCOUNTER — TELEPHONE (OUTPATIENT)
Dept: PREADMISSION TESTING | Facility: HOSPITAL | Age: 42
End: 2022-06-21
Payer: COMMERCIAL

## 2022-06-21 ENCOUNTER — TELEPHONE (OUTPATIENT)
Dept: SURGERY | Facility: CLINIC | Age: 42
End: 2022-06-21
Payer: COMMERCIAL

## 2022-06-21 ENCOUNTER — LAB VISIT (OUTPATIENT)
Dept: PRIMARY CARE CLINIC | Facility: CLINIC | Age: 42
End: 2022-06-21
Payer: COMMERCIAL

## 2022-06-21 DIAGNOSIS — Z01.818 PRE-OP TESTING: ICD-10-CM

## 2022-06-21 DIAGNOSIS — Z01.818 PRE-OP TESTING: Primary | ICD-10-CM

## 2022-06-21 LAB
SARS-COV-2 RNA RESP QL NAA+PROBE: NOT DETECTED
SARS-COV-2- CYCLE NUMBER: NORMAL

## 2022-06-21 PROCEDURE — U0005 INFEC AGEN DETEC AMPLI PROBE: HCPCS | Performed by: SURGERY

## 2022-06-21 PROCEDURE — U0003 INFECTIOUS AGENT DETECTION BY NUCLEIC ACID (DNA OR RNA); SEVERE ACUTE RESPIRATORY SYNDROME CORONAVIRUS 2 (SARS-COV-2) (CORONAVIRUS DISEASE [COVID-19]), AMPLIFIED PROBE TECHNIQUE, MAKING USE OF HIGH THROUGHPUT TECHNOLOGIES AS DESCRIBED BY CMS-2020-01-R: HCPCS | Performed by: SURGERY

## 2022-06-21 NOTE — TELEPHONE ENCOUNTER
Pre op instructions reviewed with patient per phone.    Spoke about pre op process and surgery instructions, verbalized understanding.    To confirm, Your surgeon has instructed you:  Surgery is scheduled on 6/30/22.      Please report to [facility], 1st floor.    The address is [address] [facility] [address].  [place]  [id number]       The Pre Admissions will call you the day prior to surgery with your arrival time.        INSTRUCTIONS IMPORTANT!!!  Do Not Eat, Drink, or Smoke after 12 midnight! NO WATER after midnight! OK to brush teeth, no gum, candy or mints!        *Take only these medicines with a small swallow of water-morning of surgery.    None        ____  Do Not wear makeup, mascara nail polish or artificial nails  ____  NO powder, lotions, deodorants or creams to surgical area.  ____  Please remove all jewelry, including piercings and leave at home.  SURGERY WILL BE CANCELLED IF PIERCINGS ARE PRESENT!!!  ____  Dentures, Hearing Aids and Contact Lens will need to be removed prior to the start of surgery.  ____  Please bring photo ID and insurance information to hospital (Leave Valuables at Home)  ____  If going home the same day, arrange for a ride home. You will not be able to            drive if Anesthesia was used.    ____  Wear clean, loose fitting clothing. Allow for dressings, bandages.  ____  Stop Aspirin, Ibuprofen, Motrin and Aleve at least 5-7 days before surgery, unless otherwise instructed by your doctor, or the nurse. You MAY use Tylenol/acetaminophen until day of               surgery.  ____  If you take diabetic medication, do NOT take morning of surgery unless instructed by            Doctor. Metformin to be stopped 24 hrs prior to surgery time.   ____ Stop taking any Fish Oil supplements or Vitamins at least 5 days prior to surgery, unless instructed otherwise by your Doctor.         Bathing Instructions: The night before surgery and the morning prior to  coming to the hospital:              -Do not shave your face.  -Do not shave pubic hair 7 days prior to surgery (gyn pt's).  -Do not shave legs/underarms 3 days prior to surgery.              -Shower & Rinse your body as usual with anti-bacterial Soap (Dial, Lever 2000, or Hibiclens)              -Do not use hibiclens on your head, face, or genitals.              -Do not wash with anti-bacterial soap after you use the hibiclens.              -Rinse your body thoroughly.       Pediatric patients do not need to use anti-bacterial soap or Hibiclens.            [facility] Visitor/Ride Policy:  Only 1 adult allowed (over the age of 18) to accompany you into Pre-op/Recovery Surgery Dept and must stay through the entire length of admission.    Must have a ride home from a responsible adult that you know and trust.   Pediatric Patients are allowed 2 adult visitors.    Medical Transport, Uber or Lyft can only be used if patient has a responsible adult to accompany them during ride home.     Post-Op Instructions: You will receive surgery post-op instructions by your Discharge Nurse prior to going home.     Surgical Site Infection:     Prevention of surgical site infections:                 -Keep incisions clean and dry.              -Do not soak/submerge incisions in water until completely healed.              -Do not apply lotions, powders, creams, or deodorants to site.              -Always make sure hands are cleaned with antibacterial soap/ alcohol-based   prior to touching the surgical site.       Signs and symptoms:              -Redness and pain around the area where you had surgery              -Drainage of cloudy fluid from your surgical wound              -Fever over 100.4 or chills  >>>Call Surgeon office/on-call Surgeon if you experience any of these signs & symptoms post-surgery.        *Please Call [facility] Pre-[facility] Department with surgery instruction questions at [phone].     *Insurance  Questions, please call [phone].    Pre admit office to call afternoon prior to surgery with final arrival time.

## 2022-06-21 NOTE — TELEPHONE ENCOUNTER
----- Message from Wei Steiner sent at 6/21/2022 11:19 AM CDT -----  Contact: self  Pt would like to consult with nurse regarding extended leave for spouse.  Please contact Duane Romero @ [phone].  Thanks/As

## 2022-06-21 NOTE — TELEPHONE ENCOUNTER
Pre op instructions reviewed with patient per phone.    Spoke about pre op process and surgery instructions, verbalized understanding.    To confirm, Your surgeon has instructed you:  Surgery is scheduled on 6/24/22.      Please report to [facility], 1st floor.    The [address] is [address] [facility].  [place]  [id number]       The Pre Admissions will call you the day prior to surgery with your arrival time.        INSTRUCTIONS IMPORTANT!!!  Do Not Eat, Drink, or Smoke after 12 midnight! NO WATER after midnight! OK to brush teeth, no gum, candy or mints!        *Take only these medicines with a small swallow of water-morning of surgery.    None        ____  Do Not wear makeup, mascara nail polish or artificial nails  ____  NO powder, lotions, deodorants or creams to surgical area.  ____  Please remove all jewelry, including piercings and leave at home.  SURGERY WILL BE CANCELLED IF PIERCINGS ARE PRESENT!!!  ____  Dentures, Hearing Aids and Contact Lens will need to be removed prior to the start of surgery.  ____  Please bring photo ID and insurance information to hospital (Leave Valuables at Home)  ____  If going home the same day, arrange for a ride home. You will not be able to            drive if Anesthesia was used.    ____  Wear clean, loose fitting clothing. Allow for dressings, bandages.  ____  Stop Aspirin, Ibuprofen, Motrin and Aleve at least 5-7 days before surgery, unless otherwise instructed by your doctor, or the nurse. You MAY use Tylenol/acetaminophen until day of               surgery.  ____  If you take diabetic medication, do NOT take morning of surgery unless instructed by            Doctor. Metformin to be stopped 24 hrs prior to surgery time.   ____ Stop taking any Fish Oil supplements or Vitamins at least 5 days prior to surgery, unless instructed otherwise by your Doctor.         Bathing Instructions: The night before surgery and the morning prior to  coming to the hospital:              -Do not shave your face.  -Do not shave pubic hair 7 days prior to surgery (gyn pt's).  -Do not shave legs/underarms 3 days prior to surgery.              -Shower & Rinse your body as usual with anti-bacterial Soap (Dial, Lever 2000, or Hibiclens)              -Do not use hibiclens on your head, face, or genitals.              -Do not wash with anti-bacterial soap after you use the hibiclens.              -Rinse your body thoroughly.       Pediatric patients do not need to use anti-bacterial soap or Hibiclens.            [facility] Visitor/Ride Policy:  Only 1 adult allowed (over the age of 18) to accompany you into Pre-op/Recovery Surgery Dept and must stay through the entire length of admission.    Must have a ride home from a responsible adult that you know and trust.   Pediatric Patients are allowed 2 adult visitors.    Medical Transport, Uber or Lyft can only be used if patient has a responsible adult to accompany them during ride home.     Post-Op Instructions: You will receive surgery post-op instructions by your Discharge Nurse prior to going home.     Surgical Site Infection:     Prevention of surgical site infections:                 -Keep incisions clean and dry.              -Do not soak/submerge incisions in water until completely healed.              -Do not apply lotions, powders, creams, or deodorants to site.              -Always make sure hands are cleaned with antibacterial soap/ alcohol-based   prior to touching the surgical site.       Signs and symptoms:              -Redness and pain around the area where you had surgery              -Drainage of cloudy fluid from your surgical wound              -Fever over 100.4 or chills  >>>Call Surgeon office/on-call Surgeon if you experience any of these signs & symptoms post-surgery.        *Please Call [facility] Pre[facility] Department with surgery instruction questions at [phone].     *Insurance  Questions, please call [phone].    Pre admit office to call afternoon prior to surgery with final arrival time.

## 2022-06-22 ENCOUNTER — ANESTHESIA EVENT (OUTPATIENT)
Dept: SURGERY | Facility: HOSPITAL | Age: 42
End: 2022-06-22
Payer: COMMERCIAL

## 2022-06-23 ENCOUNTER — TELEPHONE (OUTPATIENT)
Dept: PREADMISSION TESTING | Facility: HOSPITAL | Age: 42
End: 2022-06-23
Payer: COMMERCIAL

## 2022-06-23 ENCOUNTER — PATIENT MESSAGE (OUTPATIENT)
Dept: PREADMISSION TESTING | Facility: HOSPITAL | Age: 42
End: 2022-06-23
Payer: COMMERCIAL

## 2022-06-23 NOTE — TELEPHONE ENCOUNTER
Called and spoke with the patient about the following:    Your Surgery arrival time is at 6/24/2022 on 0630am at [facility] location.    The [place] is [address] [facility] [place]  [address].     Only one adult (over 18) is to accompany you to surgery, unless it is a Pediatric patient, then 2 adults are encouraged to accompany them to the surgery center.    Your ride MUST STAY the entire time until you are discharged.      Please come in the main lobby (located on the 1st floor).     Be prepared to show your photo ID and insurance card.     Nothing to eat or drink after after midnight, unless you were instructed to take specific medications discussed with the Pre-admit Nurse.     Please call [phone] with any questions or concerns.     Thanks.

## 2022-06-23 NOTE — ANESTHESIA PREPROCEDURE EVALUATION
06/23/2022  Duane Romero is a 41 y.o., female.      Pre-op Assessment    I have reviewed the Patient Summary Reports.     I have reviewed the Nursing Notes. I have reviewed the NPO Status.   I have reviewed the Medications.     Review of Systems  Anesthesia Hx:  No problems with previous Anesthesia  Denies Family Hx of Anesthesia complications.   Denies Personal Hx of Anesthesia complications.   Social:  Non-Smoker, Alcohol Use    Hematology/Oncology:  Hematology Normal        Cardiovascular:  Cardiovascular Normal     Pulmonary:   Asthma Shortness of breath    Renal/:  Renal/ Normal     Hepatic/GI:  Hepatic/GI Normal    Musculoskeletal:   Umbilical hernia   OB/GYN/PEDS:  S/p hysterectomy   Neurological:  Neurology Normal    Endocrine:  Endocrine Normal    Psych:  Psychiatric Normal           Physical Exam  General: Well nourished, Cooperative, Alert and Oriented    Airway:  Mallampati: II   Mouth Opening: Normal  TM Distance: < 4 cm  Tongue: Normal  Neck ROM: Normal ROM    Dental:  Intact    Chest/Lungs:  Clear to auscultation, Normal Respiratory Rate    Heart:  Rate: Normal  Rhythm: Regular Rhythm      Wt Readings from Last 3 Encounters:   06/03/22 76.9 kg (169 lb 8.5 oz)   05/31/22 75.9 kg (167 lb 5.3 oz)   04/20/22 77.1 kg (169 lb 15.6 oz)     Temp Readings from Last 3 Encounters:   06/03/22 36.6 °C (97.9 °F) (Tympanic)   05/31/22 36.7 °C (98.1 °F) (Temporal)   04/20/22 36.2 °C (97.2 °F) (Tympanic)     BP Readings from Last 3 Encounters:   06/03/22 114/78   05/31/22 121/68   04/20/22 132/83     Pulse Readings from Last 3 Encounters:   06/03/22 64   05/31/22 (!) 56   04/20/22 71     Lab Results   Component Value Date    WBC 4.38 06/06/2022    HGB 14.2 06/06/2022    HCT 44.8 06/06/2022    MCV 97 06/06/2022     06/06/2022       CMP  Sodium   Date Value Ref Range Status   06/06/2022 140 136 - 145  mmol/L Final     Potassium   Date Value Ref Range Status   06/06/2022 4.5 3.5 - 5.1 mmol/L Final     Chloride   Date Value Ref Range Status   06/06/2022 108 95 - 110 mmol/L Final     CO2   Date Value Ref Range Status   06/06/2022 24 23 - 29 mmol/L Final     Glucose   Date Value Ref Range Status   06/06/2022 106 70 - 110 mg/dL Final     BUN   Date Value Ref Range Status   06/06/2022 19 6 - 20 mg/dL Final     Creatinine   Date Value Ref Range Status   06/06/2022 0.6 0.5 - 1.4 mg/dL Final     Calcium   Date Value Ref Range Status   06/06/2022 8.8 8.7 - 10.5 mg/dL Final     Total Protein   Date Value Ref Range Status   06/06/2022 5.8 (L) 6.0 - 8.4 g/dL Final     Albumin   Date Value Ref Range Status   06/06/2022 3.7 3.5 - 5.2 g/dL Final     Total Bilirubin   Date Value Ref Range Status   06/06/2022 0.5 0.1 - 1.0 mg/dL Final     Comment:     For infants and newborns, interpretation of results should be based  on gestational age, weight and in agreement with clinical  observations.    Premature Infant recommended reference ranges:  Up to 24 hours.............<8.0 mg/dL  Up to 48 hours............<12.0 mg/dL  3-5 days..................<15.0 mg/dL  6-29 days.................<15.0 mg/dL       Alkaline Phosphatase   Date Value Ref Range Status   06/06/2022 53 (L) 55 - 135 U/L Final     AST   Date Value Ref Range Status   06/06/2022 15 10 - 40 U/L Final     ALT   Date Value Ref Range Status   06/06/2022 21 10 - 44 U/L Final     Anion Gap   Date Value Ref Range Status   06/06/2022 8 8 - 16 mmol/L Final     eGFR if    Date Value Ref Range Status   06/06/2022 >60.0 >60 mL/min/1.73 m^2 Final     eGFR if non    Date Value Ref Range Status   06/06/2022 >60.0 >60 mL/min/1.73 m^2 Final     Comment:     Calculation used to obtain the estimated glomerular filtration  rate (eGFR) is the CKD-EPI equation.            Anesthesia Plan  Type of Anesthesia, risks & benefits discussed:    Anesthesia Type: Gen  ETT  Intra-op Monitoring Plan: Standard ASA Monitors  Post Op Pain Control Plan: multimodal analgesia and IV/PO Opioids PRN  Induction:  IV  Informed Consent: Informed consent signed with the Patient and all parties understand the risks and agree with anesthesia plan.  All questions answered.   ASA Score: 2  Day of Surgery Review of History & Physical: H&P Update referred to the surgeon/provider.    Ready For Surgery From Anesthesia Perspective.     .

## 2022-06-24 ENCOUNTER — HOSPITAL ENCOUNTER (OUTPATIENT)
Facility: HOSPITAL | Age: 42
Discharge: HOME | End: 2022-06-24
Attending: SURGERY | Admitting: SURGERY
Payer: COMMERCIAL

## 2022-06-24 ENCOUNTER — ANESTHESIA (OUTPATIENT)
Dept: SURGERY | Facility: HOSPITAL | Age: 42
End: 2022-06-24
Payer: COMMERCIAL

## 2022-06-24 VITALS
OXYGEN SATURATION: 100 % | RESPIRATION RATE: 18 BRPM | WEIGHT: 166 LBS | BODY MASS INDEX: 29.41 KG/M2 | TEMPERATURE: 98 F | SYSTOLIC BLOOD PRESSURE: 110 MMHG | HEIGHT: 63 IN | DIASTOLIC BLOOD PRESSURE: 59 MMHG | HEART RATE: 61 BPM

## 2022-06-24 DIAGNOSIS — K42.9 UMBILICAL HERNIA: ICD-10-CM

## 2022-06-24 DIAGNOSIS — K42.9 UMBILICAL HERNIA WITHOUT OBSTRUCTION AND WITHOUT GANGRENE: Primary | ICD-10-CM

## 2022-06-24 PROCEDURE — D9220A PRA ANESTHESIA: Mod: ANES,,, | Performed by: ANESTHESIOLOGY

## 2022-06-24 PROCEDURE — 36000706: Performed by: SURGERY

## 2022-06-24 PROCEDURE — 25000003 PHARM REV CODE 250: Performed by: NURSE ANESTHETIST, CERTIFIED REGISTERED

## 2022-06-24 PROCEDURE — D9220A PRA ANESTHESIA: ICD-10-PCS | Mod: CRNA,,, | Performed by: NURSE ANESTHETIST, CERTIFIED REGISTERED

## 2022-06-24 PROCEDURE — 71000039 HC RECOVERY, EACH ADD'L HOUR: Performed by: SURGERY

## 2022-06-24 PROCEDURE — 88302 TISSUE EXAM BY PATHOLOGIST: CPT | Performed by: PATHOLOGY

## 2022-06-24 PROCEDURE — 25000003 PHARM REV CODE 250: Performed by: SURGERY

## 2022-06-24 PROCEDURE — 71000015 HC POSTOP RECOV 1ST HR: Performed by: SURGERY

## 2022-06-24 PROCEDURE — 37000009 HC ANESTHESIA EA ADD 15 MINS: Performed by: SURGERY

## 2022-06-24 PROCEDURE — 63600175 PHARM REV CODE 636 W HCPCS: Performed by: ANESTHESIOLOGY

## 2022-06-24 PROCEDURE — 88302 PR  SURG PATH,LEVEL II: ICD-10-PCS | Mod: 26,,, | Performed by: PATHOLOGY

## 2022-06-24 PROCEDURE — 37000008 HC ANESTHESIA 1ST 15 MINUTES: Performed by: SURGERY

## 2022-06-24 PROCEDURE — 71000033 HC RECOVERY, INTIAL HOUR: Performed by: SURGERY

## 2022-06-24 PROCEDURE — D9220A PRA ANESTHESIA: ICD-10-PCS | Mod: ANES,,, | Performed by: ANESTHESIOLOGY

## 2022-06-24 PROCEDURE — 88302 TISSUE EXAM BY PATHOLOGIST: CPT | Mod: 26,,, | Performed by: PATHOLOGY

## 2022-06-24 PROCEDURE — C1781 MESH (IMPLANTABLE): HCPCS | Performed by: SURGERY

## 2022-06-24 PROCEDURE — 63600175 PHARM REV CODE 636 W HCPCS: Performed by: NURSE ANESTHETIST, CERTIFIED REGISTERED

## 2022-06-24 PROCEDURE — 36000707: Performed by: SURGERY

## 2022-06-24 PROCEDURE — 49587 PR REPAIR UMBILICAL HERN,5+Y/O,STRANG: CPT | Mod: ,,, | Performed by: SURGERY

## 2022-06-24 PROCEDURE — 27201423 OPTIME MED/SURG SUP & DEVICES STERILE SUPPLY: Performed by: SURGERY

## 2022-06-24 PROCEDURE — 25000003 PHARM REV CODE 250: Performed by: ANESTHESIOLOGY

## 2022-06-24 PROCEDURE — D9220A PRA ANESTHESIA: Mod: CRNA,,, | Performed by: NURSE ANESTHETIST, CERTIFIED REGISTERED

## 2022-06-24 PROCEDURE — 49587 PR REPAIR UMBILICAL HERN,5+Y/O,STRANG: ICD-10-PCS | Mod: ,,, | Performed by: SURGERY

## 2022-06-24 DEVICE — PATCH HERNIA REPAIR 6.4CMX6.4.: Type: IMPLANTABLE DEVICE | Site: UMBILICAL | Status: FUNCTIONAL

## 2022-06-24 RX ORDER — CLINDAMYCIN PHOSPHATE 900 MG/50ML
INJECTION, SOLUTION INTRAVENOUS
Status: DISCONTINUED | OUTPATIENT
Start: 2022-06-24 | End: 2022-07-05

## 2022-06-24 RX ORDER — SODIUM CHLORIDE, SODIUM LACTATE, POTASSIUM CHLORIDE, CALCIUM CHLORIDE 600; 310; 30; 20 MG/100ML; MG/100ML; MG/100ML; MG/100ML
INJECTION, SOLUTION INTRAVENOUS CONTINUOUS PRN
Status: DISCONTINUED | OUTPATIENT
Start: 2022-06-24 | End: 2022-07-05

## 2022-06-24 RX ORDER — BUPIVACAINE HYDROCHLORIDE 2.5 MG/ML
INJECTION, SOLUTION EPIDURAL; INFILTRATION; INTRACAUDAL
Status: DISCONTINUED
Start: 2022-06-24 | End: 2022-06-24 | Stop reason: HOSPADM

## 2022-06-24 RX ORDER — LIDOCAINE HYDROCHLORIDE 10 MG/ML
1 INJECTION, SOLUTION EPIDURAL; INFILTRATION; INTRACAUDAL; PERINEURAL ONCE
Status: DISCONTINUED | OUTPATIENT
Start: 2022-06-24 | End: 2022-06-24 | Stop reason: HOSPADM

## 2022-06-24 RX ORDER — FENTANYL CITRATE 50 UG/ML
25 INJECTION, SOLUTION INTRAMUSCULAR; INTRAVENOUS EVERY 5 MIN PRN
Status: DISCONTINUED | OUTPATIENT
Start: 2022-06-24 | End: 2022-06-24 | Stop reason: HOSPADM

## 2022-06-24 RX ORDER — ONDANSETRON 2 MG/ML
INJECTION INTRAMUSCULAR; INTRAVENOUS
Status: DISCONTINUED | OUTPATIENT
Start: 2022-06-24 | End: 2022-07-05

## 2022-06-24 RX ORDER — ROCURONIUM BROMIDE 10 MG/ML
INJECTION, SOLUTION INTRAVENOUS
Status: DISCONTINUED | OUTPATIENT
Start: 2022-06-24 | End: 2022-07-05

## 2022-06-24 RX ORDER — SODIUM CHLORIDE 9 MG/ML
INJECTION, SOLUTION INTRAVENOUS CONTINUOUS
Status: DISCONTINUED | OUTPATIENT
Start: 2022-06-24 | End: 2022-06-24 | Stop reason: HOSPADM

## 2022-06-24 RX ORDER — LIDOCAINE HYDROCHLORIDE 10 MG/ML
INJECTION, SOLUTION EPIDURAL; INFILTRATION; INTRACAUDAL; PERINEURAL
Status: DISCONTINUED | OUTPATIENT
Start: 2022-06-24 | End: 2022-07-05

## 2022-06-24 RX ORDER — CLINDAMYCIN PHOSPHATE 900 MG/50ML
900 INJECTION, SOLUTION INTRAVENOUS
Status: DISCONTINUED | OUTPATIENT
Start: 2022-06-24 | End: 2022-06-24 | Stop reason: HOSPADM

## 2022-06-24 RX ORDER — HYDROCODONE BITARTRATE AND ACETAMINOPHEN 10; 325 MG/1; MG/1
1 TABLET ORAL EVERY 4 HOURS PRN
Status: DISCONTINUED | OUTPATIENT
Start: 2022-06-24 | End: 2022-06-24 | Stop reason: HOSPADM

## 2022-06-24 RX ORDER — ALBUTEROL SULFATE 0.83 MG/ML
2.5 SOLUTION RESPIRATORY (INHALATION) EVERY 4 HOURS PRN
Status: DISCONTINUED | OUTPATIENT
Start: 2022-06-24 | End: 2022-06-24 | Stop reason: HOSPADM

## 2022-06-24 RX ORDER — HYDROCODONE BITARTRATE AND ACETAMINOPHEN 5; 325 MG/1; MG/1
1 TABLET ORAL
Status: DISCONTINUED | OUTPATIENT
Start: 2022-06-24 | End: 2022-06-24 | Stop reason: HOSPADM

## 2022-06-24 RX ORDER — CLINDAMYCIN PHOSPHATE 900 MG/50ML
INJECTION, SOLUTION INTRAVENOUS
Status: COMPLETED
Start: 2022-06-24 | End: 2022-06-24

## 2022-06-24 RX ORDER — KETOROLAC TROMETHAMINE 30 MG/ML
30 INJECTION, SOLUTION INTRAMUSCULAR; INTRAVENOUS ONCE
Status: COMPLETED | OUTPATIENT
Start: 2022-06-24 | End: 2022-06-24

## 2022-06-24 RX ORDER — HYDROCODONE BITARTRATE AND ACETAMINOPHEN 5; 325 MG/1; MG/1
2 TABLET ORAL EVERY 4 HOURS PRN
Qty: 20 TABLET | Refills: 0 | Status: ON HOLD | OUTPATIENT
Start: 2022-06-24 | End: 2022-06-30 | Stop reason: SDUPTHER

## 2022-06-24 RX ORDER — FENTANYL CITRATE 50 UG/ML
INJECTION, SOLUTION INTRAMUSCULAR; INTRAVENOUS
Status: DISCONTINUED | OUTPATIENT
Start: 2022-06-24 | End: 2022-07-05

## 2022-06-24 RX ORDER — LIDOCAINE HYDROCHLORIDE 10 MG/ML
INJECTION, SOLUTION EPIDURAL; INFILTRATION; INTRACAUDAL; PERINEURAL
Status: DISCONTINUED
Start: 2022-06-24 | End: 2022-06-24 | Stop reason: HOSPADM

## 2022-06-24 RX ORDER — IBUPROFEN 800 MG/1
800 TABLET ORAL 3 TIMES DAILY PRN
Qty: 30 TABLET | Refills: 0 | Status: SHIPPED | OUTPATIENT
Start: 2022-06-24 | End: 2022-07-15

## 2022-06-24 RX ORDER — MIDAZOLAM HYDROCHLORIDE 1 MG/ML
INJECTION INTRAMUSCULAR; INTRAVENOUS
Status: DISCONTINUED | OUTPATIENT
Start: 2022-06-24 | End: 2022-07-05

## 2022-06-24 RX ORDER — SUCCINYLCHOLINE CHLORIDE 20 MG/ML
INJECTION INTRAMUSCULAR; INTRAVENOUS
Status: DISCONTINUED | OUTPATIENT
Start: 2022-06-24 | End: 2022-07-05

## 2022-06-24 RX ORDER — HYDROCODONE BITARTRATE AND ACETAMINOPHEN 5; 325 MG/1; MG/1
1 TABLET ORAL EVERY 4 HOURS PRN
Status: DISCONTINUED | OUTPATIENT
Start: 2022-06-24 | End: 2022-06-24 | Stop reason: HOSPADM

## 2022-06-24 RX ORDER — BUPIVACAINE HYDROCHLORIDE 2.5 MG/ML
INJECTION, SOLUTION EPIDURAL; INFILTRATION; INTRACAUDAL
Status: DISCONTINUED | OUTPATIENT
Start: 2022-06-24 | End: 2022-06-24 | Stop reason: HOSPADM

## 2022-06-24 RX ORDER — ONDANSETRON 2 MG/ML
4 INJECTION INTRAMUSCULAR; INTRAVENOUS ONCE AS NEEDED
Status: COMPLETED | OUTPATIENT
Start: 2022-06-24 | End: 2022-06-24

## 2022-06-24 RX ORDER — PROPOFOL 10 MG/ML
VIAL (ML) INTRAVENOUS
Status: DISCONTINUED | OUTPATIENT
Start: 2022-06-24 | End: 2022-07-05

## 2022-06-24 RX ORDER — DIPHENHYDRAMINE HYDROCHLORIDE 50 MG/ML
25 INJECTION INTRAMUSCULAR; INTRAVENOUS EVERY 6 HOURS PRN
Status: DISCONTINUED | OUTPATIENT
Start: 2022-06-24 | End: 2022-06-24 | Stop reason: HOSPADM

## 2022-06-24 RX ORDER — MEPERIDINE HYDROCHLORIDE 25 MG/ML
12.5 INJECTION INTRAMUSCULAR; INTRAVENOUS; SUBCUTANEOUS ONCE
Status: DISCONTINUED | OUTPATIENT
Start: 2022-06-24 | End: 2022-06-24 | Stop reason: HOSPADM

## 2022-06-24 RX ORDER — LIDOCAINE HYDROCHLORIDE 20 MG/ML
INJECTION INTRAVENOUS
Status: DISCONTINUED | OUTPATIENT
Start: 2022-06-24 | End: 2022-07-05

## 2022-06-24 RX ORDER — NEOSTIGMINE METHYLSULFATE 0.5 MG/ML
INJECTION, SOLUTION INTRAVENOUS
Status: DISCONTINUED | OUTPATIENT
Start: 2022-06-24 | End: 2022-07-05

## 2022-06-24 RX ORDER — LIDOCAINE HYDROCHLORIDE 10 MG/ML
INJECTION, SOLUTION EPIDURAL; INFILTRATION; INTRACAUDAL; PERINEURAL
Status: DISCONTINUED | OUTPATIENT
Start: 2022-06-24 | End: 2022-06-24 | Stop reason: HOSPADM

## 2022-06-24 RX ADMIN — PROPOFOL 150 MG: 10 INJECTION, EMULSION INTRAVENOUS at 08:06

## 2022-06-24 RX ADMIN — FENTANYL CITRATE 50 MCG: 50 INJECTION, SOLUTION INTRAMUSCULAR; INTRAVENOUS at 09:06

## 2022-06-24 RX ADMIN — FENTANYL CITRATE 25 MCG: 50 INJECTION, SOLUTION INTRAMUSCULAR; INTRAVENOUS at 10:06

## 2022-06-24 RX ADMIN — ONDANSETRON 4 MG: 2 INJECTION INTRAMUSCULAR; INTRAVENOUS at 11:06

## 2022-06-24 RX ADMIN — FENTANYL CITRATE 50 MCG: 50 INJECTION, SOLUTION INTRAMUSCULAR; INTRAVENOUS at 08:06

## 2022-06-24 RX ADMIN — SUCCINYLCHOLINE CHLORIDE 120 MG: 20 INJECTION, SOLUTION INTRAMUSCULAR; INTRAVENOUS at 08:06

## 2022-06-24 RX ADMIN — ONDANSETRON 4 MG: 2 INJECTION, SOLUTION INTRAMUSCULAR; INTRAVENOUS at 08:06

## 2022-06-24 RX ADMIN — Medication 50 MG: at 08:06

## 2022-06-24 RX ADMIN — SODIUM CHLORIDE, SODIUM LACTATE, POTASSIUM CHLORIDE, AND CALCIUM CHLORIDE: 600; 310; 30; 20 INJECTION, SOLUTION INTRAVENOUS at 08:06

## 2022-06-24 RX ADMIN — GLYCOPYRROLATE 0.4 MG: 0.2 INJECTION, SOLUTION INTRAMUSCULAR; INTRAVITREAL at 09:06

## 2022-06-24 RX ADMIN — ROCURONIUM BROMIDE 5 MG: 10 INJECTION, SOLUTION INTRAVENOUS at 08:06

## 2022-06-24 RX ADMIN — HYDROCODONE BITARTRATE AND ACETAMINOPHEN 1 TABLET: 5; 325 TABLET ORAL at 10:06

## 2022-06-24 RX ADMIN — CLINDAMYCIN PHOSPHATE 900 MG: 18 INJECTION, SOLUTION INTRAVENOUS at 08:06

## 2022-06-24 RX ADMIN — MIDAZOLAM HYDROCHLORIDE 2 MG: 1 INJECTION INTRAMUSCULAR; INTRAVENOUS at 08:06

## 2022-06-24 RX ADMIN — ROCURONIUM BROMIDE 30 MG: 10 INJECTION, SOLUTION INTRAVENOUS at 08:06

## 2022-06-24 RX ADMIN — NEOSTIGMINE METHYLSULFATE 4 MG: 0.5 INJECTION, SOLUTION INTRAVENOUS at 09:06

## 2022-06-24 RX ADMIN — LIDOCAINE HYDROCHLORIDE 50 MG: 10 INJECTION, SOLUTION EPIDURAL; INFILTRATION; INTRACAUDAL; PERINEURAL at 08:06

## 2022-06-24 RX ADMIN — FENTANYL CITRATE 25 MCG: 50 INJECTION, SOLUTION INTRAMUSCULAR; INTRAVENOUS at 11:06

## 2022-06-24 RX ADMIN — KETOROLAC TROMETHAMINE 30 MG: 30 INJECTION, SOLUTION INTRAMUSCULAR at 11:06

## 2022-06-24 RX ADMIN — SODIUM CHLORIDE, SODIUM LACTATE, POTASSIUM CHLORIDE, AND CALCIUM CHLORIDE: 600; 310; 30; 20 INJECTION, SOLUTION INTRAVENOUS at 10:06

## 2022-06-24 NOTE — ANESTHESIA PROCEDURE NOTES
Intubation    Date/Time: 6/24/2022 8:29 AM  Performed by: Taylor Sol CRNA  Authorized by: Sophia Ibarra MD     Intubation:     Induction:  Intravenous    Intubated:  Postinduction    Mask Ventilation:  Easy mask    Attempts:  1    Attempted By:  CRNA    Method of Intubation:  Direct    Blade:  Mono 3    Laryngeal View Grade: Grade IIA - cords partially seen      Difficult Airway Encountered?: No      Airway Device:  Oral endotracheal tube    Airway Device Size:  7.0    Style/Cuff Inflation:  Cuffed (inflated to minimal occlusive pressure)    Tube secured:  20    Secured at:  The lips    Placement Verified By:  Capnometry    Complicating Factors:  None    Findings Post-Intubation:  BS equal bilateral and atraumatic/condition of teeth unchanged

## 2022-06-24 NOTE — H&P
History & Physical     SUBJECTIVE:      History of Present Illness:  Patient is a 41 y.o. female referred for hernia repair.  She reports a bulge at at her umbilicus.  She denies any severe pain but reports it is uncomfortable when pressed upon at times.  She is going to be undergoing ENT surgery soon as well.     No chief complaint on file.              Review of patient's allergies indicates:   Allergen Reactions    Pcn [penicillins] Itching       Injection only.  Can tolerate oral form         Current Medications          Current Outpatient Medications   Medication Sig Dispense Refill    clobetasoL (TEMOVATE) 0.05 % cream Apply to ear canals twice daily for one week as needed. 15 g 0    fluticasone propionate (FLONASE) 50 mcg/actuation nasal spray 2 sprays (100 mcg total) by Each Nostril route once daily. 16 g 12    multivitamin,tx-minerals Tab Take by mouth once daily.        ofloxacin (FLOXIN) 0.3 % otic solution Place 5 drops into both ears 2 (two) times daily. (Patient not taking: No sig reported) 10 mL 0    sulfacetamide sodium 10% (BLEPH-10) 10 % ophthalmic solution Apply 3-4 drops to the right ear twice daily for 7 days. (Patient not taking: No sig reported) 5 mL 0    triamcinolone acetonide 0.025% (KENALOG) 0.025 % cream Apply topically 2 (two) times daily. (Patient not taking: Reported on 2022) 15 g 2      No current facility-administered medications for this visit.                 Past Medical History:   Diagnosis Date    Allergy      Asthma      Hyperlipidemia      Otitis media      Prediabetes      Sinusitis      Type A insulin resistance              Past Surgical History:   Procedure Laterality Date    ADENOIDECTOMY         SECTION        CYST REMOVAL Right       Under right arm    DILATION AND CURETTAGE OF UTERUS        HYSTERECTOMY        TONSILLECTOMY        TYMPANOPLASTY        TYMPANOSTOMY TUBE PLACEMENT          Family History   Problem Relation Age of Onset     Cancer Maternal Aunt           Skin cancer    Thyroid disease Maternal Grandmother      Hyperlipidemia Mother      Diabetes Father           DM2 insulin pump & oral    Hypertension Father      Obesity Father      Migraines Neg Hx      Asthma Neg Hx        Social History      Tobacco Use    Smoking status: Never Smoker    Smokeless tobacco: Never Used   Substance Use Topics    Alcohol use: Not Currently    Drug use: Never         Review of Systems:  Review of Systems   Constitutional: Negative for activity change, chills, fatigue, fever and unexpected weight change.   HENT: Positive for hearing loss and rhinorrhea. Negative for trouble swallowing.    Eyes: Negative for discharge and visual disturbance.   Respiratory: Negative for cough, chest tightness, shortness of breath, wheezing and stridor.    Cardiovascular: Negative for chest pain, palpitations and leg swelling.   Gastrointestinal: Negative for abdominal distention, abdominal pain, blood in stool, constipation, diarrhea, nausea and vomiting.   Endocrine: Negative for polydipsia and polyuria.   Genitourinary: Negative for difficulty urinating, dysuria, frequency, hematuria, menstrual problem and urgency.   Musculoskeletal: Negative for arthralgias, joint swelling and neck pain.   Skin: Negative for color change, pallor, rash and wound.   Neurological: Positive for headaches. Negative for weakness.   Hematological: Does not bruise/bleed easily.   Psychiatric/Behavioral: Negative for confusion and dysphoric mood.         OBJECTIVE:      Vital Signs (Most Recent)  Temp: 97.2 °F (36.2 °C) (04/20/22 1509)  Pulse: 71 (04/20/22 1509)  BP: 132/83 (04/20/22 1509)  77.1 kg (169 lb 15.6 oz)      Physical Exam:  Physical Exam  Vitals reviewed.   Constitutional:       Appearance: She is well-developed.   HENT:      Head: Normocephalic and atraumatic.   Cardiovascular:      Rate and Rhythm: Normal rate and regular rhythm.   Pulmonary:      Effort: Pulmonary  effort is normal.      Breath sounds: Normal breath sounds.   Abdominal:      General: Bowel sounds are normal. There is no distension.      Palpations: Abdomen is soft.      Tenderness: There is no abdominal tenderness.      Hernia: A hernia (Umbilical reducible) is present.   Musculoskeletal:      Cervical back: Neck supple.   Skin:     General: Skin is warm and dry.   Neurological:      Mental Status: She is alert and oriented to person, place, and time.             ASSESSMENT/PLAN:      41-year-old female with umbilical hernia     PLAN:Plan      Umbilical hernia repair possible mesh; date to be determined based on patient's scheduled surgery  Preop: CBC, BMP, EKG  Risks and benefits discussed with patient including: pain, bleeding, infection, injury to underlying organs, recurrence, need for further procedure

## 2022-06-24 NOTE — OP NOTE
[facility]  Surgery Department  Operative Note    SUMMARY     Date of Procedure: 6/24/2022     Procedure:   Open Incarcerated umbilical hernia repair with mesh    Surgeon(s) and Role:     * Zoraida Dial MD - Primary    Assisting Surgeon: None    Pre-Operative Diagnosis: Umbilical hernia without obstruction and without gangrene [K42.9]    Post-Operative Diagnosis: Post-Op Diagnosis Codes:     Incarcerated umbilical hernia    Anesthesia: General    Operative Findings (including complications, if any):  Umbilical hernia repair with mesh    Description of Technical Procedures:  Umbilical hernia containing incarcerated omentum    Estimated Blood Loss (EBL): 20cc           Implants:   Implant Name Type Inv. Item Serial No.  Lot No. LRB No. Used Action   PATCH HERNIA REPAIR 6.4CMX6.4. - [id number]  PATCH HERNIA REPAIR 6.4CMX6.4.  [employer]  N/A 1 Implanted       Specimens:   Specimen (24h ago, onward)             Start     Ordered    06/24/22 0918  Specimen to Pathology, Surgery General Surgery  Once        Comments: Pre-op Diagnosis: Umbilical hernia without obstruction and without gangrene [K42.9]Procedure(s):REPAIR, HERNIA, UMBILICAL, AGE 5 YEARS OR OLDER Number of specimens: 1Name of specimens: hernia sac and omentum -perm     References:    Click here for ordering Quick Tip   Question Answer Comment   Procedure Type: General Surgery    Which provider would you like to cc? ZORAIDA DIAL    Release to patient Immediate        06/24/22 0917                        Condition: Good    Disposition: PACU - hemodynamically stable.      OPERATIVE PROCEDURE: The patient was identified in preoperative holding, brought back to the Operating Room. She was placed supine on the operating table and padded appropriately. Monitors were applied and a smooth induction of general endotracheal anesthesia. Her abdomen was shaved with electric clippers and prepped and draped in the standard sterile surgical  fashion. A timeout was performed and all team members present, agreed this was the correct procedure on the correct patient. We also confirmed administration of appropriate preoperative antibiotics.     A curvilinear infraumbilical skin incision was made with the scalpel. Subcutaneous tissue was divided with Bovie electrocautery and this dissection was carried down to the anterior abdominal wall fascia. We bluntly dissected all the way around the umbilical stalk. We used cautery to dissect the base of the stalk off of the anterior abdominal fascia without causing injury to the dermis of the umbilical stalk.  There was a fair amount of incarcerated omentum.  It was unable to be reduced through the defect.  The incarcerated portion of the omentum was excised with a Harmonic scalpel.  The hernia defect was then cleared off to healthy fascia after removing the hernia sac. We measured it at 2 cm in diameter. Bovie electrocautery was used to take down a few adhesions of omentum from the anterior abdominal wall around the defect. We decided to repair it with a piece of  mesh. We used the medium 6 cm Ventralex, it was briefly soaked in saline and then introduced into the abdomen. The mesh was sewn in place with interrupted 0 Prolene sutures. All sutures were placed loosely. We checked for gaps between them and found none and then all sutures were tied. The fascia was closed over the mesh with a running 0 PDS suture. The base of the umbilical stalk was tacked down to the fascia with a single buried interrupted 3-0 Vicryl suture. Ysabel fascia was reapproximated with three buried interrupted 3-0 Vicryl sutures and the skin was closed with a running subcuticular 4-0 Monocryl suture. Marcaine was administered. A sterile dressing was applied. The patient was extubated in the Operating Room and transported to the Recovery Room in stable condition. All sponge, instrument and needle counts were correct at the end of the case. I was  present and scrubbed for the entire procedure.

## 2022-06-24 NOTE — DISCHARGE INSTRUCTIONS
Nozin Instructions  Goal: the goal of Nozin is to reduce the risk of post-procedural infections by bacteria in the nasal cavity. Think of it as hand  for your nose.    How to use:    1. Shake Nozin bottle well    2. Take a cotton swab and apply 4 drops to one tip    3. Insert cotton tip into one nostril, being sure not to go deeper into nose than tip of the swab.    4. Swab nostril 6 times counterclockwise and 6 times clockwise. Make sure to swab the inside front pocket of the nostril.    5. Take swab out and apply 2 drops to the same cotton tip. Repeat steps 3 and 4 in the other nostril.        Do steps 1-5 twice a day for 7 days.

## 2022-06-24 NOTE — TRANSFER OF CARE
"Anesthesia Transfer of Care Note    Patient: Duane Romero    Procedure(s) Performed: Procedure(s) (LRB):  REPAIR, HERNIA, UMBILICAL, AGE 5 YEARS OR OLDER (N/A)    Patient location: PACU    Anesthesia Type: general    Transport from OR: Transported from OR on room air with adequate spontaneous ventilation    Post pain: adequate analgesia    Post assessment: no apparent anesthetic complications and tolerated procedure well    Post vital signs: stable    Level of consciousness: awake, alert and oriented    Nausea/Vomiting: no nausea/vomiting    Complications: none    Transfer of care protocol was followed      Last vitals:   Visit Vitals  /61 (BP Location: Left arm, Patient Position: Lying)   Pulse 74   Temp 36.6 °C (97.9 °F) (Temporal)   Resp 12   Ht 5' 3" (1.6 m)   Wt 75.3 kg (166 lb 0.1 oz)   LMP 05/30/2021   SpO2 100%   Breastfeeding No   BMI 29.41 kg/m²     "

## 2022-06-24 NOTE — BRIEF OP NOTE
[facility] Periop Services  Brief Operative Note    Surgery Date: 6/24/2022     Surgeon(s) and Role:     * Zoraida Dial MD - Primary    Assisting Surgeon: None    Pre-op Diagnosis:  Umbilical hernia without obstruction and without gangrene [K42.9]    Post-op Diagnosis:  Post-Op Diagnosis Codes:     * Umbilical hernia without obstruction and without gangrene [K42.9]    Procedure(s) (LRB):  REPAIR, HERNIA, UMBILICAL, AGE 5 YEARS OR OLDER (N/A)    Anesthesia: General    Operative Findings: see op note    Estimated Blood Loss: * No values recorded between 6/24/2022  8:38 AM and 6/24/2022  9:46 AM *         Specimens:   Specimen (24h ago, onward)             Start     Ordered    06/24/22 0918  Specimen to Pathology, Surgery General Surgery  Once        Comments: Pre-op Diagnosis: Umbilical hernia without obstruction and without gangrene [K42.9]Procedure(s):REPAIR, HERNIA, UMBILICAL, AGE 5 YEARS OR OLDER Number of specimens: 1Name of specimens: hernia sac and omentum -perm     References:    Click here for ordering Quick Tip   Question Answer Comment   Procedure Type: General Surgery    Which provider would you like to cc? ZORAIDA DIAL    Release to patient Immediate        06/24/22 0917                  Discharge Note    OUTCOME: Patient tolerated treatment/procedure well without complication and is now ready for discharge.    DISPOSITION: Home or Self Care    FINAL DIAGNOSIS:  Umbilical hernia     FOLLOWUP: In clinic    DISCHARGE INSTRUCTIONS:    Discharge Procedure Orders   Diet general     Lifting restrictions   Order Comments: No heavy lifting greater than 10 lb or strenuous activity times 6 weeks     Call MD for:  temperature >100.4     Call MD for:  persistent nausea and vomiting     Call MD for:  severe uncontrolled pain     Call MD for:  difficulty breathing, headache or visual disturbances     Call MD for:  redness, tenderness, or signs of infection (pain, swelling, redness, odor or green/yellow discharge  around incision site)     Call MD for:  hives     Call MD for:  persistent dizziness or light-headedness     Call MD for:  extreme fatigue     Remove dressing in 48 hours     Activity as tolerated

## 2022-06-25 ENCOUNTER — NURSE TRIAGE (OUTPATIENT)
Dept: ADMINISTRATIVE | Facility: CLINIC | Age: 42
End: 2022-06-25
Payer: COMMERCIAL

## 2022-06-25 NOTE — TELEPHONE ENCOUNTER
Patient c/o constipation and inability to pass gas x 2 days. She had a hernia repair yesterday. Patient also reports pencil-like stools prior to her surgery. Advised per protocol to be seen within 2-3 days for pencil-like stools. Supportive measures were discussed for constipation and gas. Advised the patient to call back with any further questions or if symptoms worsen. Patient VU.     Reason for Disposition   Pencil-like, narrow stools    Additional Information   Negative: Patient sounds very sick or weak to the triager   Negative: [1] Vomiting AND [2] abdomen looks much more swollen than usual   Negative: [1] Vomiting AND [2] contains bile (green color)   Negative: [1] Constant abdominal pain AND [2] present > 2 hours   Negative: [1] Rectal pain or fullness from fecal impaction (rectum full of stool) AND [2] NOT better after SITZ bath, suppository or enema   Negative: [1] Intermittent mild abdominal pain AND [2] fever   Negative: Leaking stool   Negative: Abdomen is more swollen than usual   Negative: Last bowel movement (BM) > 4 days ago   Negative: Unable to have a bowel movement (BM) without manually removing stool (using finger to pull out stool or perform disimpaction)   Negative: Unable to have a bowel movement (BM) without laxative or enema   Negative: [1] Constipation persists > 1 week AND [2] no improvement after using CARE ADVICE   Negative: [1] Weight loss > 10 pounds (5 kg) AND [2] not dieting    Protocols used: CONSTIPATION-A-

## 2022-06-25 NOTE — TELEPHONE ENCOUNTER
Pt post op hernia repair yesterday. Wanting to know when/how soon ok to take shower. DC instructions reviewed, no details regarding how soon to shower-only to discuss with surgeon. Pt advised message would be sent to surgeon & clinic requesting callback when clinic reopens Monday morning, to avoid shower & getting site wet until speaking with clinic for further verification. Pt vu.     Reason for Disposition   [1] Caller requesting NON-URGENT health information AND [2] PCP's office is the best resource    Protocols used: INFORMATION ONLY CALL - NO TRIAGE-A-

## 2022-06-26 ENCOUNTER — NURSE TRIAGE (OUTPATIENT)
Dept: ADMINISTRATIVE | Facility: CLINIC | Age: 42
End: 2022-06-26
Payer: COMMERCIAL

## 2022-06-26 NOTE — TELEPHONE ENCOUNTER
Reason for Disposition   Constipation   Taking new prescription medication    Additional Information   Negative: Sounds like a life-threatening emergency to the triager   Negative: Chest pain   Negative: Difficulty breathing   Negative: Acting confused (e.g., disoriented, slurred speech) or excessively sleepy   Negative: Surgical incision symptoms and questions   Negative: [1] Discomfort (pain, burning or stinging) when passing urine AND [2] male   Negative: [1] Discomfort (pain, burning or stinging) when passing urine AND [2] female   Negative: [1] Abdomen pain is main symptom AND [2] male   Negative: [1] Abdomen pain is main symptom AND [2] adult female   Negative: Rectal bleeding or blood in stool is main symptom   Negative: Rectal pain or itching is main symptom   Negative: Constipation in a cancer patient who is currently (or recently) receiving chemotherapy or radiation therapy, or cancer patient who has metastatic or end-stage cancer and is receiving palliative care   Negative: Patient sounds very sick or weak to the triager   Negative: [1] Vomiting AND [2] abdomen looks much more swollen than usual   Negative: [1] Vomiting AND [2] contains bile (green color)   Negative: [1] Constant abdominal pain AND [2] present > 2 hours   Negative: [1] Rectal pain or fullness from fecal impaction (rectum full of stool) AND [2] NOT better after SITZ bath, suppository or enema   Negative: [1] Intermittent mild abdominal pain AND [2] fever   Negative: Abdomen is more swollen than usual   Negative: Last bowel movement (BM) > 4 days ago   Negative: Leaking stool   Negative: Unable to have a bowel movement (BM) without manually removing stool (using finger to pull out stool or perform disimpaction)   Negative: Unable to have a bowel movement (BM) without laxative or enema   Negative: [1] Constipation persists > 1 week AND [2] no improvement after using CARE ADVICE   Negative: [1] Weight loss > 10  pounds (5 kg) AND [2] not dieting   Negative: Pencil-like, narrow stools    Protocols used: POST-OP SYMPTOMS AND [id number]-GW, CONSTIPATION-A-AH  pt called re had hernia surg Friday. today battling with constipation. Passed small stool twice this am. stomach gurgling. was nauseated. +flatus. some abd pain earlier. given supp today then passed stool., rec colace, miralax. Follow up with dr in am. Pt agrees. Call back with questions

## 2022-06-26 NOTE — TELEPHONE ENCOUNTER
F/u call placed to pt in response to MD advisement on when to shower. Unable to reach at number listed for callback.

## 2022-06-27 ENCOUNTER — TELEPHONE (OUTPATIENT)
Dept: ALLERGY | Facility: CLINIC | Age: 42
End: 2022-06-27
Payer: COMMERCIAL

## 2022-06-27 ENCOUNTER — ANESTHESIA EVENT (OUTPATIENT)
Dept: SURGERY | Facility: HOSPITAL | Age: 42
End: 2022-06-27
Payer: COMMERCIAL

## 2022-06-27 ENCOUNTER — TELEPHONE (OUTPATIENT)
Dept: SURGERY | Facility: CLINIC | Age: 42
End: 2022-06-27
Payer: COMMERCIAL

## 2022-06-27 ENCOUNTER — NURSE TRIAGE (OUTPATIENT)
Dept: ADMINISTRATIVE | Facility: CLINIC | Age: 42
End: 2022-06-27
Payer: COMMERCIAL

## 2022-06-27 NOTE — TELEPHONE ENCOUNTER
Patient reports having umbilical hernia sx Friday. Patient and  took gauze off today and noticed the area had slight redness and bruising. Denies any pain, discharge, fever. Reports gauze clean and dry upon removal. Advised patient of dispo to see PCP within 24 hours. Verbalized understanding. Advised to call back if symptoms become worse or with further questions.          Reason for Disposition   [1] Small red area or streak AND [2] no fever    Additional Information   Negative: [1] Major abdominal surgical incision AND [2] wound gaping open AND [3] visible internal organs   Negative: Sounds like a life-threatening emergency to the triager   Negative: [1] Bleeding from incision AND [2] won't stop after 10 minutes of direct pressure   Negative: [1] Widespread rash AND [2] bright red, sunburn-like   Negative: Severe pain in the incision   Negative: [1] Incision gaping open AND [2] < 48 hours since wound re-opened   Negative: [1] Incision gaping open AND [2] length of opening > 2 inches (5 cm)   Negative: Patient sounds very sick or weak to the triager   Negative: Sounds like a serious complication to the triager   Negative: Fever > 100.4 F (38.0 C)   Negative: [1] Incision looks infected (spreading redness, pain) AND [2] fever > 99.5 F (37.5 C)   Negative: [1] Incision looks infected (spreading redness, pain) AND [2] large red area (> 2 in. or 5 cm)   Negative: [1] Incision looks infected (spreading redness, pain) AND [2] face wound   Negative: [1] Red streak runs from the incision AND [2] longer than 1 inch (2.5 cm)   Negative: [1] Post-op pain AND [2] not controlled with pain medications   Negative: Dressing soaked with blood or body fluid (e.g., drainage)   Negative: [1] Raised bruise and [2] size > 2 inches (5 cm) and expanding   Negative: [1] Caller has URGENT question AND [2] triager unable to answer question   Negative: [1] INCREASING pain in incision AND [2] > 2 days (48 hours)  since surgery    Protocols used: POST-OP INCISION SYMPTOMS AND [id number]-H-HE

## 2022-06-27 NOTE — ANESTHESIA PREPROCEDURE EVALUATION
06/27/2022  Duane Romero is a 41 y.o., female.      Pre-op Assessment    I have reviewed the Patient Summary Reports.     I have reviewed the Nursing Notes. I have reviewed the NPO Status.   I have reviewed the Medications.     Review of Systems  Anesthesia Hx:  No problems with previous Anesthesia Eaasy mask, Grade 1 view on DL.  History of prior surgery of interest to airway management or planning: Previous anesthesia: General Airway issues documented on chart review include mask, easy, easy direct laryngoscopy  Denies Family Hx of Anesthesia complications.   Denies Personal Hx of Anesthesia complications.   Social:  Non-Smoker, Alcohol Use    Hematology/Oncology:  Hematology Normal        Cardiovascular:  Cardiovascular Normal     Pulmonary:   Asthma Shortness of breath    Renal/:  Renal/ Normal     Hepatic/GI:  Hepatic/GI Normal    Musculoskeletal:   Umbilical hernia   OB/GYN/PEDS:  S/p hysterectomy   Neurological:  Neurology Normal    Endocrine:  Endocrine Normal    Psych:  Psychiatric Normal           Physical Exam  General: Well nourished, Cooperative, Alert and Oriented    Airway:  Mallampati: II   Mouth Opening: Normal  TM Distance: < 4 cm  Tongue: Normal  Neck ROM: Normal ROM    Dental:  Intact    Chest/Lungs:  Clear to auscultation, Normal Respiratory Rate    Heart:  Rate: Normal  Rhythm: Regular Rhythm      Wt Readings from Last 3 Encounters:   06/24/22 75.3 kg (166 lb 0.1 oz)   06/03/22 76.9 kg (169 lb 8.5 oz)   05/31/22 75.9 kg (167 lb 5.3 oz)     Temp Readings from Last 3 Encounters:   06/24/22 36.6 °C (97.9 °F) (Temporal)   06/03/22 36.6 °C (97.9 °F) (Tympanic)   05/31/22 36.7 °C (98.1 °F) (Temporal)     BP Readings from Last 3 Encounters:   06/24/22 (!) 110/59   06/03/22 114/78   05/31/22 121/68     Pulse Readings from Last 3 Encounters:   06/24/22 61   06/03/22 64   05/31/22 (!) 56      Lab Results   Component Value Date    WBC 4.38 06/06/2022    HGB 14.2 06/06/2022    HCT 44.8 06/06/2022    MCV 97 06/06/2022     06/06/2022       CMP  Sodium   Date Value Ref Range Status   06/06/2022 140 136 - 145 mmol/L Final     Potassium   Date Value Ref Range Status   06/06/2022 4.5 3.5 - 5.1 mmol/L Final     Chloride   Date Value Ref Range Status   06/06/2022 108 95 - 110 mmol/L Final     CO2   Date Value Ref Range Status   06/06/2022 24 23 - 29 mmol/L Final     Glucose   Date Value Ref Range Status   06/06/2022 106 70 - 110 mg/dL Final     BUN   Date Value Ref Range Status   06/06/2022 19 6 - 20 mg/dL Final     Creatinine   Date Value Ref Range Status   06/06/2022 0.6 0.5 - 1.4 mg/dL Final     Calcium   Date Value Ref Range Status   06/06/2022 8.8 8.7 - 10.5 mg/dL Final     Total Protein   Date Value Ref Range Status   06/06/2022 5.8 (L) 6.0 - 8.4 g/dL Final     Albumin   Date Value Ref Range Status   06/06/2022 3.7 3.5 - 5.2 g/dL Final     Total Bilirubin   Date Value Ref Range Status   06/06/2022 0.5 0.1 - 1.0 mg/dL Final     Comment:     For infants and newborns, interpretation of results should be based  on gestational age, weight and in agreement with clinical  observations.    Premature Infant recommended reference ranges:  Up to 24 hours.............<8.0 mg/dL  Up to 48 hours............<12.0 mg/dL  3-5 days..................<15.0 mg/dL  6-29 days.................<15.0 mg/dL       Alkaline Phosphatase   Date Value Ref Range Status   06/06/2022 53 (L) 55 - 135 U/L Final     AST   Date Value Ref Range Status   06/06/2022 15 10 - 40 U/L Final     ALT   Date Value Ref Range Status   06/06/2022 21 10 - 44 U/L Final     Anion Gap   Date Value Ref Range Status   06/06/2022 8 8 - 16 mmol/L Final     eGFR if    Date Value Ref Range Status   06/06/2022 >60.0 >60 mL/min/1.73 m^2 Final     eGFR if non    Date Value Ref Range Status   06/06/2022 >60.0 >60 mL/min/1.73 m^2  Final     Comment:     Calculation used to obtain the estimated glomerular filtration  rate (eGFR) is the CKD-EPI equation.            Anesthesia Plan  Type of Anesthesia, risks & benefits discussed:    Anesthesia Type: Gen ETT  Intra-op Monitoring Plan: Standard ASA Monitors  Post Op Pain Control Plan: multimodal analgesia and IV/PO Opioids PRN  Induction:  IV  Informed Consent: Informed consent signed with the Patient and all parties understand the risks and agree with anesthesia plan.  All questions answered.   ASA Score: 2  Day of Surgery Review of History & Physical: H&P Update referred to the surgeon/provider.    Ready For Surgery From Anesthesia Perspective.     .

## 2022-06-27 NOTE — TELEPHONE ENCOUNTER
----- Message from Jerry Moore sent at 6/27/2022  9:39 AM CDT -----  Contact: [phone]  Patient would like to consult with a nurse in regards to rescheduling her injections. Please call back [phone]. Thanks elva

## 2022-06-28 ENCOUNTER — TELEPHONE (OUTPATIENT)
Dept: PREADMISSION TESTING | Facility: HOSPITAL | Age: 42
End: 2022-06-28
Payer: COMMERCIAL

## 2022-06-28 NOTE — TELEPHONE ENCOUNTER
Pre op instructions reviewed with patient per phone.    Spoke about pre op process and surgery instructions, verbalized understanding.    To confirm, Your surgeon has instructed you:  Surgery is scheduled on 06/30/2022.      Please report to [facility], 1st floor.    The address is [address].  [place]  [address]       The Pre Admissions will call you the day prior to surgery with your arrival time.        INSTRUCTIONS IMPORTANT!!!  Do Not Eat, Drink, or Smoke after 12 midnight! NO WATER after midnight! OK to brush teeth, no gum, candy or mints!        *Take only these medicines with a small swallow of water-morning of surgery.    NONE        ____  Do Not wear makeup, mascara nail polish or artificial nails  ____  NO powder, lotions, deodorants or creams to surgical area.  ____  Please remove all jewelry, including piercings and leave at home.  SURGERY WILL BE CANCELLED IF PIERCINGS ARE PRESENT!!!  ____  Dentures, Hearing Aids and Contact Lens will need to be removed prior to the start of surgery.  ____  Please bring photo ID and insurance information to hospital (Leave Valuables at Home)  ____  If going home the same day, arrange for a ride home. You will not be able to            drive if Anesthesia was used.    ____  Wear clean, loose fitting clothing. Allow for dressings, bandages.  ____  Stop Aspirin, Ibuprofen, Motrin and Aleve at least 5-7 days before surgery, unless otherwise instructed by your doctor, or the nurse. You MAY use Tylenol/acetaminophen until day of               surgery.  ____  If you take diabetic medication, do NOT take morning of surgery unless instructed by            Doctor. Metformin to be stopped 24 hrs prior to surgery time.   ____ Stop taking any Fish Oil supplements or Vitamins at least 5 days prior to surgery, unless instructed otherwise by your Doctor.         Bathing Instructions: The night before surgery and the morning prior to  coming to the hospital:              -Do not shave your face.  -Do not shave pubic hair 7 days prior to surgery (gyn pt's).  -Do not shave legs/underarms 3 days prior to surgery.              -Shower & Rinse your body as usual with anti-bacterial Soap (Dial, Lever 2000, or Hibiclens)              -Do not use hibiclens on your head, face, or genitals.              -Do not wash with anti-bacterial soap after you use the hibiclens.              -Rinse your body thoroughly.       Pediatric patients do not need to use anti-bacterial soap or Hibiclens.            [facility] Visitor/Ride Policy:  Only 1 adult allowed (over the age of 18) to accompany you into Pre-op/Recovery Surgery Dept and must stay through the entire length of admission.    Must have a ride home from a responsible adult that you know and trust.   Pediatric Patients are allowed 2 adult visitors.    Medical Transport, Uber or Lyft can only be used if patient has a responsible adult to accompany them during ride home.     Post-Op Instructions: You will receive surgery post-op instructions by your Discharge Nurse prior to going home.     Surgical Site Infection:     Prevention of surgical site infections:                 -Keep incisions clean and dry.              -Do not soak/submerge incisions in water until completely healed.              -Do not apply lotions, powders, creams, or deodorants to site.              -Always make sure hands are cleaned with antibacterial soap/ alcohol-based   prior to touching the surgical site.       Signs and symptoms:              -Redness and pain around the area where you had surgery              -Drainage of cloudy fluid from your surgical wound              -Fever over 100.4 or chills  >>>Call Surgeon office/on-call Surgeon if you experience any of these signs & symptoms post-surgery.        *Please Call [facility] Pre-[facility] Department with surgery instruction questions at [phone].     *Insurance  Questions, please call [phone].    Pre admit office to call afternoon prior to surgery with final arrival time.

## 2022-06-28 NOTE — TELEPHONE ENCOUNTER
Called patient educated to follow up with the surgeon that did her surgery , educated on s/s of infection to keep clean and dry.Referr to surgeon for wound care . Patient cg stated okay thank you

## 2022-06-29 ENCOUNTER — TELEPHONE (OUTPATIENT)
Dept: PREADMISSION TESTING | Facility: HOSPITAL | Age: 42
End: 2022-06-29
Payer: COMMERCIAL

## 2022-06-29 ENCOUNTER — PATIENT MESSAGE (OUTPATIENT)
Dept: SURGERY | Facility: HOSPITAL | Age: 42
End: 2022-06-29
Payer: COMMERCIAL

## 2022-06-29 LAB
FINAL PATHOLOGIC DIAGNOSIS: NORMAL
GROSS: NORMAL
Lab: NORMAL

## 2022-06-29 NOTE — TELEPHONE ENCOUNTER
Called and spoke with the pt about the following:    Your Surgery arrival time is at 10:30 AM on 6/30/2022 at [facility] location.    The [place] is [address] [place]  [address].     Only one adult (over 18) is to accompany you to surgery, unless it is a Pediatric patient, then 2 adults are encouraged to accompany them to the surgery center.    Your ride MUST STAY the entire time until you are discharged.      Please come in the main lobby (located on the 1st floor).     Be prepared to show your photo ID and insurance card.     Nothing to eat or drink after after midnight, unless you were instructed to take specific medications discussed with the Pre-admit Nurse.     Please call [phone] with any questions or concerns.     Thanks.

## 2022-06-30 ENCOUNTER — HOSPITAL ENCOUNTER (OUTPATIENT)
Facility: HOSPITAL | Age: 42
Discharge: HOME | End: 2022-06-30
Attending: OTOLARYNGOLOGY | Admitting: OTOLARYNGOLOGY
Payer: COMMERCIAL

## 2022-06-30 ENCOUNTER — ANESTHESIA (OUTPATIENT)
Dept: SURGERY | Facility: HOSPITAL | Age: 42
End: 2022-06-30
Payer: COMMERCIAL

## 2022-06-30 ENCOUNTER — NURSE TRIAGE (OUTPATIENT)
Dept: ADMINISTRATIVE | Facility: CLINIC | Age: 42
End: 2022-06-30
Payer: COMMERCIAL

## 2022-06-30 VITALS
WEIGHT: 162.5 LBS | OXYGEN SATURATION: 97 % | TEMPERATURE: 98 F | DIASTOLIC BLOOD PRESSURE: 70 MMHG | HEART RATE: 91 BPM | HEIGHT: 64 IN | RESPIRATION RATE: 18 BRPM | BODY MASS INDEX: 27.74 KG/M2 | SYSTOLIC BLOOD PRESSURE: 132 MMHG

## 2022-06-30 DIAGNOSIS — H90.0 CONDUCTIVE HEARING LOSS OF BOTH EARS: ICD-10-CM

## 2022-06-30 DIAGNOSIS — H90.0 HEARING LOSS, CONDUCTIVE, BILATERAL: Primary | ICD-10-CM

## 2022-06-30 LAB — SARS-COV-2 RNA NPH QL NAA+NON-PROBE: NOT DETECTED

## 2022-06-30 PROCEDURE — C1713 ANCHOR/SCREW BN/BN,TIS/BN: HCPCS | Performed by: OTOLARYNGOLOGY

## 2022-06-30 PROCEDURE — 37000009 HC ANESTHESIA EA ADD 15 MINS: Performed by: OTOLARYNGOLOGY

## 2022-06-30 PROCEDURE — 69710 PR IMPLANT/REPLACE HEAR AID,TEMP BONE: ICD-10-PCS | Mod: 50,,, | Performed by: OTOLARYNGOLOGY

## 2022-06-30 PROCEDURE — 25000003 PHARM REV CODE 250: Performed by: ANESTHESIOLOGY

## 2022-06-30 PROCEDURE — 36000711: Performed by: OTOLARYNGOLOGY

## 2022-06-30 PROCEDURE — D9220A PRA ANESTHESIA: ICD-10-PCS | Mod: ANES,,, | Performed by: ANESTHESIOLOGY

## 2022-06-30 PROCEDURE — L8690 AUD OSSEO DEV, INT/EXT COMP: HCPCS | Performed by: OTOLARYNGOLOGY

## 2022-06-30 PROCEDURE — 71000033 HC RECOVERY, INTIAL HOUR: Performed by: OTOLARYNGOLOGY

## 2022-06-30 PROCEDURE — 27201423 OPTIME MED/SURG SUP & DEVICES STERILE SUPPLY: Performed by: OTOLARYNGOLOGY

## 2022-06-30 PROCEDURE — D9220A PRA ANESTHESIA: ICD-10-PCS | Mod: CRNA,,, | Performed by: NURSE ANESTHETIST, CERTIFIED REGISTERED

## 2022-06-30 PROCEDURE — 36000710: Performed by: OTOLARYNGOLOGY

## 2022-06-30 PROCEDURE — 69710 IMPLANT/REPLACE HEARING AID: CPT | Mod: 50,,, | Performed by: OTOLARYNGOLOGY

## 2022-06-30 PROCEDURE — 63600175 PHARM REV CODE 636 W HCPCS: Performed by: ANESTHESIOLOGY

## 2022-06-30 PROCEDURE — D9220A PRA ANESTHESIA: Mod: CRNA,,, | Performed by: NURSE ANESTHETIST, CERTIFIED REGISTERED

## 2022-06-30 PROCEDURE — 71000015 HC POSTOP RECOV 1ST HR: Performed by: OTOLARYNGOLOGY

## 2022-06-30 PROCEDURE — 25000003 PHARM REV CODE 250: Performed by: NURSE ANESTHETIST, CERTIFIED REGISTERED

## 2022-06-30 PROCEDURE — 25000003 PHARM REV CODE 250: Performed by: OTOLARYNGOLOGY

## 2022-06-30 PROCEDURE — 63600175 PHARM REV CODE 636 W HCPCS: Performed by: NURSE ANESTHETIST, CERTIFIED REGISTERED

## 2022-06-30 PROCEDURE — D9220A PRA ANESTHESIA: Mod: ANES,,, | Performed by: ANESTHESIOLOGY

## 2022-06-30 PROCEDURE — 37000008 HC ANESTHESIA 1ST 15 MINUTES: Performed by: OTOLARYNGOLOGY

## 2022-06-30 DEVICE — SCREW COVER BAHA 4MM: Type: IMPLANTABLE DEVICE | Site: MASTOID | Status: FUNCTIONAL

## 2022-06-30 DEVICE — IMPLANTABLE DEVICE: Type: IMPLANTABLE DEVICE | Site: MASTOID | Status: FUNCTIONAL

## 2022-06-30 RX ORDER — METHYLENE BLUE 10 MG/ML
INJECTION INTRAVENOUS
Status: DISCONTINUED | OUTPATIENT
Start: 2022-06-30 | End: 2022-06-30 | Stop reason: HOSPADM

## 2022-06-30 RX ORDER — METHYLENE BLUE 5 MG/ML
INJECTION INTRAVENOUS
Status: DISCONTINUED
Start: 2022-06-30 | End: 2022-06-30 | Stop reason: HOSPADM

## 2022-06-30 RX ORDER — ALBUTEROL SULFATE 0.83 MG/ML
2.5 SOLUTION RESPIRATORY (INHALATION) EVERY 4 HOURS PRN
Status: DISCONTINUED | OUTPATIENT
Start: 2022-06-30 | End: 2022-06-30 | Stop reason: HOSPADM

## 2022-06-30 RX ORDER — ROCURONIUM BROMIDE 10 MG/ML
INJECTION, SOLUTION INTRAVENOUS
Status: DISCONTINUED | OUTPATIENT
Start: 2022-06-30 | End: 2022-06-30

## 2022-06-30 RX ORDER — CLINDAMYCIN PHOSPHATE 900 MG/50ML
900 INJECTION, SOLUTION INTRAVENOUS
Status: COMPLETED | OUTPATIENT
Start: 2022-06-30 | End: 2022-06-30

## 2022-06-30 RX ORDER — HYDROCODONE BITARTRATE AND ACETAMINOPHEN 5; 325 MG/1; MG/1
1 TABLET ORAL EVERY 6 HOURS PRN
Qty: 18 TABLET | Refills: 0 | Status: SHIPPED | OUTPATIENT
Start: 2022-06-30 | End: 2022-07-15

## 2022-06-30 RX ORDER — ONDANSETRON 2 MG/ML
INJECTION INTRAMUSCULAR; INTRAVENOUS
Status: DISCONTINUED | OUTPATIENT
Start: 2022-06-30 | End: 2022-06-30

## 2022-06-30 RX ORDER — LIDOCAINE HYDROCHLORIDE AND EPINEPHRINE 10; 10 MG/ML; UG/ML
INJECTION, SOLUTION INFILTRATION; PERINEURAL
Status: DISCONTINUED | OUTPATIENT
Start: 2022-06-30 | End: 2022-06-30 | Stop reason: HOSPADM

## 2022-06-30 RX ORDER — HYDROCODONE BITARTRATE AND ACETAMINOPHEN 5; 325 MG/1; MG/1
1 TABLET ORAL EVERY 4 HOURS PRN
Status: DISCONTINUED | OUTPATIENT
Start: 2022-06-30 | End: 2022-06-30 | Stop reason: HOSPADM

## 2022-06-30 RX ORDER — PROPOFOL 10 MG/ML
VIAL (ML) INTRAVENOUS
Status: DISCONTINUED | OUTPATIENT
Start: 2022-06-30 | End: 2022-06-30

## 2022-06-30 RX ORDER — FENTANYL CITRATE 50 UG/ML
25 INJECTION, SOLUTION INTRAMUSCULAR; INTRAVENOUS EVERY 5 MIN PRN
Status: DISCONTINUED | OUTPATIENT
Start: 2022-06-30 | End: 2022-06-30 | Stop reason: HOSPADM

## 2022-06-30 RX ORDER — ONDANSETRON 8 MG/1
8 TABLET, ORALLY DISINTEGRATING ORAL 2 TIMES DAILY
Qty: 12 TABLET | Refills: 0 | Status: SHIPPED | OUTPATIENT
Start: 2022-06-30 | End: 2022-07-15

## 2022-06-30 RX ORDER — MEPERIDINE HYDROCHLORIDE 25 MG/ML
12.5 INJECTION INTRAMUSCULAR; INTRAVENOUS; SUBCUTANEOUS ONCE
Status: DISCONTINUED | OUTPATIENT
Start: 2022-06-30 | End: 2022-06-30 | Stop reason: HOSPADM

## 2022-06-30 RX ORDER — SODIUM CHLORIDE 0.9 % (FLUSH) 0.9 %
10 SYRINGE (ML) INJECTION
Status: DISCONTINUED | OUTPATIENT
Start: 2022-06-30 | End: 2022-06-30 | Stop reason: HOSPADM

## 2022-06-30 RX ORDER — HYDROCODONE BITARTRATE AND ACETAMINOPHEN 5; 325 MG/1; MG/1
1 TABLET ORAL
Status: DISCONTINUED | OUTPATIENT
Start: 2022-06-30 | End: 2022-06-30 | Stop reason: HOSPADM

## 2022-06-30 RX ORDER — FENTANYL CITRATE 50 UG/ML
INJECTION, SOLUTION INTRAMUSCULAR; INTRAVENOUS
Status: DISCONTINUED | OUTPATIENT
Start: 2022-06-30 | End: 2022-06-30

## 2022-06-30 RX ORDER — NEOSTIGMINE METHYLSULFATE 1 MG/ML
INJECTION, SOLUTION INTRAVENOUS
Status: DISCONTINUED | OUTPATIENT
Start: 2022-06-30 | End: 2022-06-30

## 2022-06-30 RX ORDER — CLINDAMYCIN PHOSPHATE 900 MG/50ML
INJECTION, SOLUTION INTRAVENOUS
Status: COMPLETED
Start: 2022-06-30 | End: 2022-06-30

## 2022-06-30 RX ORDER — ONDANSETRON 2 MG/ML
4 INJECTION INTRAMUSCULAR; INTRAVENOUS ONCE AS NEEDED
Status: DISCONTINUED | OUTPATIENT
Start: 2022-06-30 | End: 2022-06-30 | Stop reason: HOSPADM

## 2022-06-30 RX ORDER — MIDAZOLAM HYDROCHLORIDE 1 MG/ML
INJECTION, SOLUTION INTRAMUSCULAR; INTRAVENOUS
Status: DISCONTINUED | OUTPATIENT
Start: 2022-06-30 | End: 2022-06-30

## 2022-06-30 RX ORDER — CLINDAMYCIN HYDROCHLORIDE 300 MG/1
300 CAPSULE ORAL EVERY 6 HOURS
Qty: 28 CAPSULE | Refills: 0 | Status: SHIPPED | OUTPATIENT
Start: 2022-06-30 | End: 2022-07-15

## 2022-06-30 RX ORDER — LIDOCAINE HYDROCHLORIDE 10 MG/ML
INJECTION INFILTRATION; PERINEURAL
Status: DISCONTINUED | OUTPATIENT
Start: 2022-06-30 | End: 2022-06-30

## 2022-06-30 RX ORDER — SODIUM CHLORIDE, SODIUM LACTATE, POTASSIUM CHLORIDE, CALCIUM CHLORIDE 600; 310; 30; 20 MG/100ML; MG/100ML; MG/100ML; MG/100ML
INJECTION, SOLUTION INTRAVENOUS CONTINUOUS
Status: DISCONTINUED | OUTPATIENT
Start: 2022-06-30 | End: 2022-11-22

## 2022-06-30 RX ORDER — DIPHENHYDRAMINE HYDROCHLORIDE 50 MG/ML
25 INJECTION INTRAMUSCULAR; INTRAVENOUS EVERY 6 HOURS PRN
Status: DISCONTINUED | OUTPATIENT
Start: 2022-06-30 | End: 2022-06-30 | Stop reason: HOSPADM

## 2022-06-30 RX ORDER — LIDOCAINE HYDROCHLORIDE 10 MG/ML
1 INJECTION, SOLUTION EPIDURAL; INFILTRATION; INTRACAUDAL; PERINEURAL ONCE
Status: DISCONTINUED | OUTPATIENT
Start: 2022-06-30 | End: 2022-06-30 | Stop reason: HOSPADM

## 2022-06-30 RX ADMIN — SODIUM CHLORIDE, SODIUM LACTATE, POTASSIUM CHLORIDE, AND CALCIUM CHLORIDE: .6; .31; .03; .02 INJECTION, SOLUTION INTRAVENOUS at 11:06

## 2022-06-30 RX ADMIN — PROPOFOL 80 MG: 10 INJECTION, EMULSION INTRAVENOUS at 12:06

## 2022-06-30 RX ADMIN — GLYCOPYRROLATE 0.2 MG: 0.2 INJECTION, SOLUTION INTRAMUSCULAR; INTRAVITREAL at 02:06

## 2022-06-30 RX ADMIN — HYDROCODONE BITARTRATE AND ACETAMINOPHEN 1 TABLET: 5; 325 TABLET ORAL at 02:06

## 2022-06-30 RX ADMIN — FENTANYL CITRATE 25 MCG: 50 INJECTION, SOLUTION INTRAMUSCULAR; INTRAVENOUS at 02:06

## 2022-06-30 RX ADMIN — FENTANYL CITRATE 50 MCG: 50 INJECTION, SOLUTION INTRAMUSCULAR; INTRAVENOUS at 12:06

## 2022-06-30 RX ADMIN — ONDANSETRON 4 MG: 2 INJECTION, SOLUTION INTRAMUSCULAR; INTRAVENOUS at 02:06

## 2022-06-30 RX ADMIN — ROCURONIUM BROMIDE 50 MG: 10 INJECTION, SOLUTION INTRAVENOUS at 12:06

## 2022-06-30 RX ADMIN — LIDOCAINE HYDROCHLORIDE 100 MG: 10 INJECTION INFILTRATION; PERINEURAL at 12:06

## 2022-06-30 RX ADMIN — GLYCOPYRROLATE 0.4 MG: 0.2 INJECTION, SOLUTION INTRAMUSCULAR; INTRAVITREAL at 02:06

## 2022-06-30 RX ADMIN — CLINDAMYCIN PHOSPHATE 900 MG: 900 INJECTION, SOLUTION INTRAVENOUS at 12:06

## 2022-06-30 RX ADMIN — PROPOFOL 120 MG: 10 INJECTION, EMULSION INTRAVENOUS at 12:06

## 2022-06-30 RX ADMIN — NEOSTIGMINE METHYLSULFATE 3 MG: 1 INJECTION INTRAVENOUS at 02:06

## 2022-06-30 RX ADMIN — FENTANYL CITRATE 25 MCG: 50 INJECTION, SOLUTION INTRAMUSCULAR; INTRAVENOUS at 01:06

## 2022-06-30 RX ADMIN — MIDAZOLAM 2 MG: 1 INJECTION INTRAMUSCULAR; INTRAVENOUS at 11:06

## 2022-06-30 NOTE — ANESTHESIA PROCEDURE NOTES
Intubation    Date/Time: 6/30/2022 12:03 PM  Performed by: Sophia Ibarra MD  Authorized by: Sophia Ibarra MD     Intubation:     Induction:  Intravenous    Intubated:  Postinduction    Mask Ventilation:  Easy mask    Attempts:  1    Attempted By:  CRNA    Method of Intubation:  Direct    Blade:  Mono 3    Laryngeal View Grade: Grade I - full view of cords      Difficult Airway Encountered?: No      Complications:  None    Airway Device:  Oral endotracheal tube    Airway Device Size:  7.0    Style/Cuff Inflation:  Cuffed    Tube secured:  20    Secured at:  The teeth    Placement Verified By:  Capnometry    Complicating Factors:  None    Findings Post-Intubation:  BS equal bilateral and atraumatic/condition of teeth unchanged

## 2022-06-30 NOTE — ADDENDUM NOTE
Addendum  created 06/30/22 1457 by Myles Roy, CRNA    Child order released for a procedure order, Clinical Note Signed, Delete clinical note, Intraprocedure Blocks edited, LDA created via procedure documentation, LDA deleted via procedure documentation, Order Canceled from Note, SmartForm saved

## 2022-06-30 NOTE — H&P
Subjective:       Patient ID: Duane Romero is a 41 y.o. female.    Chief Complaint: Hearing loss    Hearing Loss: No fever.         Duane Romero is a 41 y.o. 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  She also has associated bilateral conductive hearing loss worse in the left ear.  She has had a previous tympanoplasty 1 of her ears that did not take.  Previously we discussed options for hearing rehabilitation including hearing aids, revision tympanoplasty and BCIs    Review of Systems   Constitutional: Negative for chills and fever.   HENT: Positive for hearing loss. Negative for sore throat and trouble swallowing.    Respiratory: Negative for apnea and chest tightness.    Cardiovascular: Negative for chest pain.         Objective:      Physical Exam  Constitutional:       Appearance: She is well-developed.   HENT:      Head: Normocephalic and atraumatic.      Right Ear: Ear canal and external ear normal. Tympanic membrane is perforated.      Left Ear: Ear canal and external ear normal. Tympanic membrane is perforated.      Ears:        Comments: Binocular Microscopy  Indications: perforation, pre op planning  Details: binocular microscopy used to examine both ears  Findings  AD:  Anterior inferior perforation dry unable to see anterior margin due to small ear canal and anterior hump of the of ear  AS:       Nose: Nose normal.      Mouth/Throat:      Pharynx: Uvula midline.   Neck:      Thyroid: No thyroid mass.   Musculoskeletal:      Cervical back: Normal range of motion.             Assessment:       1. Hearing loss, conductive, bilateral    2. Conductive hearing loss of both ears        Plan:         the patient's problem and treatment thereof.  In summary she has pleasant 40-year-old with bilateral marginal tympanic membrane perforations, narrow ear canals,  and history of prior failed tympanoplasty.    We discussed treatment options including  traditional hearing aids, bone conducting hearing aids, and revision tympanoplasty. Patient would prefer not to have another tympanoplasty and instead wants to proceed with bilateral BCIs.     We discussed the risks of surgery including, pain, bleeding, infection, scarring, device failure,     Patient wishes to proceed with Osia BCI system.  Will plan to do bilateral implants.      Answers for HPI/ROS submitted by the patient on 3/30/2022  Seasonal Allergies?: Yes      H&P completed on  3/01/22 has been reviewed, the patient has been examined and:  I concur with the findings and no changes have occurred since H&P was written.    There are no hospital problems to display for this patient.

## 2022-06-30 NOTE — PATIENT INSTRUCTIONS
"    Precautions  You may take the velcro dressing or pressure dress off the ear 24hrs after surgery.  Leave the tape ("steri-strips") on the incision itself.  You may feel the implant under skin, please do not try to push on it or rub it, leave it alone.  Keep the incision dry for the first week.      "

## 2022-06-30 NOTE — ANESTHESIA POSTPROCEDURE EVALUATION
Anesthesia Post Evaluation    Patient: Duane Romero    Procedure(s) Performed: Procedure(s) (LRB):  INSERTION, BAHA (Bilateral)    Final Anesthesia Type: general      Patient location during evaluation: PACU  Patient participation: Yes- Able to Participate  Level of consciousness: awake and alert, awake and oriented  Post-procedure vital signs: reviewed and stable  Pain management: adequate  Airway patency: patent    PONV status at discharge: No PONV  Anesthetic complications: no      Cardiovascular status: blood pressure returned to baseline  Respiratory status: unassisted, spontaneous ventilation and room air  Hydration status: euvolemic  Follow-up not needed.          Vitals Value Taken Time   /63 06/30/22 1443   Temp 36.7 °C (98.1 °F) 06/30/22 1435   Pulse 100 06/30/22 1444   Resp 16 06/30/22 1444   SpO2 93 % 06/30/22 1444   Vitals shown include unvalidated device data.      No case tracking events are documented in the log.      Pain/Candelaria Score: No data recorded

## 2022-06-30 NOTE — PLAN OF CARE
Patient prepped for surgery. Anesthesia notified of recent surgery on 06/24/2022. Dr. Ibarra verified with Dr. Paiz. Ok to continue with admit/surgery per anesthesia.

## 2022-06-30 NOTE — OP NOTE
[facility]  Surgery Department  Operative Note    SUMMARY     Date of Procedure: 6/30/2022     Procedure: Procedure(s) (LRB):  INSERTION, BAHA (Bilateral)     Surgeon(s) and Role:     * Jeff Kelley MD - Primary    Assisting Surgeon: None    Pre-Operative Diagnosis: Conductive hearing loss, bilateral [H90.0]    Post-Operative Diagnosis: Post-Op Diagnosis Codes:     * Conductive hearing loss, bilateral [H90.0]    Anesthesia: General    Operative Findings (including complications, if any):   4mm abutment placed bilaterally    Description of Technical Procedures:     Patient was brought to the operating room and intubated by the anesthesia team.  The bed was turned 180 degrees the Both sides wer prepped by shaving the post auricular area.  The outline  of the device was drawn using a marking pen.  It was placed approximately 1-2 cm behind the auricle in line with the external acoustic meatus.  The location of the implant was marked using methylene blue.  A linear incision was then marked about a cm away from the planned location of the device.  The skin was prepped using betadine and the area was draped.    The incision was made with a 15 blade down to the bone.  The periosteum was elevated towards the planned implant site.  Enough periosteum was elevated to fit the implant template.  We identified our previously marked spot with methylene blue.  This would be our spot for drilling.    Drilling commenced at 20,000 rpm using the 4mm drill with drill guard in place.  After drilling to 3mm the hole was checked for dural exposure.  Having confirmed no dural exposure, the guard was removed and the hole was drilled to 4mm.  The 4mm counter-sink drill was then used to widen the hole and create a counter sink.  The drill settings were then changed to 40 rpm and the implant was attached to the drill. wihout using irrigation for the first 280 degrees of insertion the implant was placed perpendicular to the skull  using the drill, until it locked into place.    The bone indicator was used to measure the flatness of the surrounding bone.  Additional drilling  Was not  needed to flatten the area.  The implant was then placed into its periosteal pocket and the actuator was the screwed into the implant and tightened to 25 newtons.  The periosteum and skin flap were closed in layers      An identical procedure was then performed on the opposite ear. Again a 4mm implant was used. Of not on the right side the predicted spot had to be adjusted due to the presence of soft tissue at 3mm.  A new spot was chosen slightly superior to this spot.     The patient was  Turned over to the anesthesia team and awakened from the surgery.                     Estimated Blood Loss (EBL): 10ml    Implants:   Implant Name Type Inv. Item Serial No.  Lot No. LRB No. Used Action   osi implant templant     [id number] Bilateral 1 Implanted and Explanted   IMPLANT OSIA [id number] - [id number]  IMPLANT OSIA [id number] COCHLEAR CESAR  Right 1 Implanted   SCREW COVER BAHA 4MM - [id number]  SCREW COVER BAHA 4MM  COCHLEAR CESAR [id number] Right 1 Implanted   SCREW COVER BAHA 4MM - [id number]  SCREW COVER BAHA 4MM  COCHLEAR CESAR [id number] Left 1 Implanted   IMPLANT OSIA [id number] - [id number]  IMPLANT OSIA [id number] COCHLEAR CESAR  Left 1 Implanted       Specimens:   Specimen (24h ago, onward)            None                  Condition: Good    Disposition: PACU - hemodynamically stable.    Attestation: I was present and scrubbed for the entire procedure.

## 2022-06-30 NOTE — PLAN OF CARE
POC reviewed with patient and significant other both deny any current questions, complaints, or concerns at this time.     Reviewed and completed all discharge orders.   Printed AVS and educated patient and family member of its entirety, including physician's orders, follow-up appt, medications, when to call, and when to report to the emergency room. Reviewed prescriptions, pharmacy information, and made sure there were no conflicts preventing the patient from obtaining the newly prescribed medications. I encouraged questions, answered them thoroughly, and evaluated my instructions via teach-back method. Patient has met all hospital discharge criteria at this point.

## 2022-06-30 NOTE — BRIEF OP NOTE
[facility] Periop Services  Brief Operative Note    Surgery Date: 6/30/2022     Surgeon(s) and Role:     * Jeff Kelley MD - Primary    Assisting Surgeon: None    Pre-op Diagnosis:  Conductive hearing loss, bilateral [H90.0]    Post-op Diagnosis:  Post-Op Diagnosis Codes:     * Conductive hearing loss, bilateral [H90.0]    Procedure(s) (LRB):  INSERTION, BAHA (Bilateral)    Anesthesia: General    Operative Findings: 4mm implant placed    Estimated Blood Loss: 10ml         Specimens:   Specimen (24h ago, onward)            None            Discharge Note    OUTCOME: Patient tolerated treatment/procedure well without complication and is now ready for discharge.    DISPOSITION: Home or Self Care    FINAL DIAGNOSIS:  Conductive hearing loss bilateral     FOLLOWUP: In clinic    DISCHARGE INSTRUCTIONS:    Discharge Procedure Orders   Diet general     Call MD for:  persistent nausea and vomiting     Call MD for:  severe uncontrolled pain     Remove dressing in 24 hours      Please see below message regarding phentermine  Should she stop the medication? Pt last seen 4/22/22 and has appt on 5/27/22  I copied past refills into chart from [facility] web site and sent to provider for approval         Patient Id Name OPHELIA O VIPIN [place] 106   [] [address] [facility] 70- F [address] [facility] [address] [place]           Search Criteria    Name Date of Birth Date Range   Cholo Mendieta 83- [birth year]- To [birth year]-     Requester Name Requested Date   Vahid Godoy May 17 2022 15:41:06 GMT-0400 Deleta Glance Daylight Time)     Summary   Prescriptions  16  Prescribers  5  Pharmacies  1  View Map    Drug Classes   Benzodiazepines  3  Stimulants  1  Opioids  1  Muscle Relaxants  0    Opioid Dosage   Total MME for Active Prescriptions  0    Average MME  37 50  MME Graph   MME Calculator       · Prescriptions  · Notifications    · Prescribers  · Pharmacies  · MME Graph      [] [place]   Drug Categories:      [] Stimulants       [] Benzodiazepines       [] Opioids       [] Others       Show  entries  Search:  Patient Id Prescription #  Filled Written Drug Label Qty Days Strength MME** Prescriber Pharmacy Payment REFILL #/Auth State Detail   1  [id number] 04/25/2022 04/25/2022 Phentermine Hcl (Capsule)  30 0 30 37 5 MG NA [facility]  [place] 00 / 0 [place]    1  [id number] 04/15/2022  04/15/2022 Zolpidem Tartrate (Tablet)  30 0 30 5 MG NA [facility]  Medicaid 00 / 0 [place]    1  [id number] 03/28/2022 03/27/2022 diazePAM (Tablet)  15 0 5 5 MG NA ANTHONYLUKE BANKS  [facility]  [place]icaid 00 / 0 [place]    1  [id number] 03/08/2022 03/02/2022 Zolpidem Tartrate (Tablet)  30 0 30 5 MG NA [facility]  [place] 00 / 0 [place]    1  [id number] 02/08/2022 02/08/2022 Zolpidem Tartrate (Tablet)  30 0 30 5 MG NA [facility]    [place] 00 / 0 [place]    1  [id number] 01/28/2022 01/28/2022 oxyCODONE HCL-ACETAMINOPHEN (Tablet)  10 0 2 325 MG-5 MG 37 50 CARMELA FLORES  [facility]  [place] 00 / 0 [place]    1  [id number] 01/21/2022 01/21/2022 Butalbital-acetaminophen-caffeine (Tablet)  30 0 5 325 MG-50 MG-40 MG NA [facility]  [place]icaid 00 / 0 [place]    1  [id number] 01/07/2022 01/07/2022 Zolpidem Tartrate (Tablet)  30 0 30 10 MG NA [facility]  Medicaid 00 / 0 [place]    1  [id number] 12/05/2021 11/29/2021 Zolpidem Tartrate (Tablet)  30 0 30 10 MG NA [facility]  Medicaid 00 / 0 [place]    1  [id number] 11/29/2021 11/29/2021 diazePAM (Tablet)  2 0 1 5 MG NA [facility]    Medicaid 00 / 0 [place]    Showing 1 to 10 of 16 entries  [id number]    · Prescriptions  · Notifications    · Prescribers  · Pharmacies  · MME Graph      [] [place]    Drug Categories:      [] Stimulants       [] Benzodiazepines       [] Opioids       [] Others       Show  entries  Search:     ALPRAZOLAM (Tablet)  15 days   Written: 07-  Filled: 07-    Qty: 60 0  Strength: 0 25 MG    NDC:  10940667299      BUTALBITAL-ACETAMINOPHEN-CAFFEINE (Tablet)  5 days   Written: 01-  Filled: 01-    Qty: 30 0  Strength: 325 MG-50 MG-40 MG    NDC:  [id number]      DIAZEPAM (Tablet)  1 days   Written: 11-  Filled: 11-    Qty: 2 0  Strength: 5 MG    NDC:  61022765204      DIAZEPAM (Tablet)  5 days   Written: 03-  Filled: 03-    Qty: 15 0  Strength: 5 MG    NDC:  65692180857      OXYCODONE HCL-ACETAMINOPHEN (Tablet)  2 days   Written: 01-  Filled: 01-    Qty: 10 0  Strength: 325 MG-5 MG    NDC:  09699533469      PHENTERMINE HCL (Capsule)  30 days   Written: 04-  Filled: 04-    Qty: 30 0  Strength: 37 5 MG    NDC:  41851656429      ZOLPIDEM TARTRATE (Tablet)  30 days   Written: 01-  Filled: 01-    Qty: 30 0  Strength: 10 MG    NDC:  52663418170      ZOLPIDEM TARTRATE (Tablet)  30 days   Written: 02-  Filled: 02-    Qty: 30 0  Strength: 5 MG    NDC:  58761224288      ZOLPIDEM TARTRATE (Tablet)  30 days   Written: 03-  Filled: 03-    Qty: 30 0  Strength: 5 MG    NDC:  36528198010      ZOLPIDEM TARTRATE (Tablet)  30 days   Written: 04-  Filled: 04-    Qty: 30 0  Strength: 5 MG    NDC:  83597226854      Showing 1 to 10 of 16 entries  [id number]       * Per CDC guidance, the conversion factors and associated daily morphine milligram equivalents for drugs prescribed as part of medication-assisted treatment for opioid use disorder should not be used to benchmark against dosage thresholds meant for opioids prescribed for pain  Report Disclaimer:  Information from the [place] [employer] (PDMP) database is protected health information and any information accessed must be treated as confidential  Any person who knowingly or intentionally makes an unauthorized disclosure of information from the [address] [place] [address] will be subject to civil and criminal penalties as set forth in the ABC-MAP Act 2014-191, Act of Oct  27, 2014, P L  2911  The information in the [address] [place] [address] is submitted by pharmacies and may contain errors and omissions  Independent verification of prescription information with pharmacies and prescribers may sometimes be prudent or necessary        Educational content  CDC MME calculation guidelines  [place] Prescribing Guidelines  Letter Regarding the Misapplication of Prescribing Guidelines   Guide for Appropriate Tapering or Discontinuation of Long-Term Opioid Use   #[id number]

## 2022-06-30 NOTE — ANESTHESIA PROCEDURE NOTES
Ad Hoc Intubation    Date/Time: 6/30/2022 12:03 PM  Performed by: Myles Roy CRNA  Authorized by: Sophia Ibarra MD     Indications:  Anesthesia  Diagnosis:  Anesthesia  Patient Location:  Done in OR  Staff:     Anesthesiologist Present: Yes    Intubation:     Induction:  Inhalational - mask    Intubated:  Postinduction    Mask Ventilation:  Easy mask    Attempts:  1    Attempted By:  CRNA    Method of Intubation:  Direct    Blade:  Mono 3    Laryngeal View Grade: Grade I - full view of chords      Difficult Airway Encountered?: No      Complications:  None    Style/Cuff Inflation:  Cuffed    Tube secured:  20    Secured at:  The teeth    Complicating Factors:  None    Findings Post-Intubation:  BS equal bilateral

## 2022-07-01 NOTE — TELEPHONE ENCOUNTER
Patient has questions about her pain regimen. Patient had surgery earlier today. Triage nurse reviewed pain regimen. All questions and concerns were addressed. Advised the patient to call back with any further questions or concerns.     Reason for Disposition   Caller has medicine question only, adult not sick, AND triager answers question    Protocols used: MEDICATION QUESTION CALL-A-

## 2022-07-05 NOTE — ANESTHESIA POSTPROCEDURE EVALUATION
Anesthesia Post Evaluation    Patient: Duane Romero    Procedure(s) Performed: Procedure(s) (LRB):  REPAIR, HERNIA, UMBILICAL, AGE 5 YEARS OR OLDER (N/A)    Final Anesthesia Type: general      Patient location during evaluation: PACU  Patient participation: Yes- Able to Participate  Level of consciousness: awake and alert and oriented  Post-procedure vital signs: reviewed and stable  Pain management: adequate  Airway patency: patent    PONV status at discharge: No PONV  Anesthetic complications: no      Cardiovascular status: blood pressure returned to baseline, stable and hemodynamically stable  Respiratory status: unassisted  Hydration status: euvolemic  Follow-up not needed.          Vitals Value Taken Time   /59 06/24/22 1115   Temp 36.6 °C (97.9 °F) 06/24/22 1009   Pulse 61 06/24/22 1200   Resp 18 06/24/22 1200   SpO2 100 % 06/24/22 1200         Event Time   Out of Recovery 06/24/2022 11:30:00         Pain/Candelaria Score: No data recorded

## 2022-07-06 ENCOUNTER — OFFICE VISIT (OUTPATIENT)
Dept: SURGERY | Facility: CLINIC | Age: 42
End: 2022-07-06
Payer: COMMERCIAL

## 2022-07-06 ENCOUNTER — PATIENT MESSAGE (OUTPATIENT)
Dept: AUDIOLOGY | Facility: CLINIC | Age: 42
End: 2022-07-06
Payer: COMMERCIAL

## 2022-07-06 VITALS
HEART RATE: 61 BPM | DIASTOLIC BLOOD PRESSURE: 69 MMHG | BODY MASS INDEX: 28.71 KG/M2 | WEIGHT: 164.69 LBS | SYSTOLIC BLOOD PRESSURE: 111 MMHG | TEMPERATURE: 97 F

## 2022-07-06 DIAGNOSIS — K42.9 UMBILICAL HERNIA WITHOUT OBSTRUCTION AND WITHOUT GANGRENE: Primary | ICD-10-CM

## 2022-07-06 PROCEDURE — 99024 POSTOP FOLLOW-UP VISIT: CPT | Mod: S$GLB,,,

## 2022-07-06 PROCEDURE — 3078F DIAST BP <80 MM HG: CPT | Mod: CPTII,S$GLB,,

## 2022-07-06 PROCEDURE — 99999 PR PBB SHADOW E&M-EST. PATIENT-LVL III: ICD-10-PCS | Mod: PBBFAC,,,

## 2022-07-06 PROCEDURE — 3008F PR BODY MASS INDEX (BMI) DOCUMENTED: ICD-10-PCS | Mod: CPTII,S$GLB,,

## 2022-07-06 PROCEDURE — 3074F PR MOST RECENT SYSTOLIC BLOOD PRESSURE < 130 MM HG: ICD-10-PCS | Mod: CPTII,S$GLB,,

## 2022-07-06 PROCEDURE — 3074F SYST BP LT 130 MM HG: CPT | Mod: CPTII,S$GLB,,

## 2022-07-06 PROCEDURE — 3008F BODY MASS INDEX DOCD: CPT | Mod: CPTII,S$GLB,,

## 2022-07-06 PROCEDURE — 3044F HG A1C LEVEL LT 7.0%: CPT | Mod: CPTII,S$GLB,,

## 2022-07-06 PROCEDURE — 1159F MED LIST DOCD IN RCRD: CPT | Mod: CPTII,S$GLB,,

## 2022-07-06 PROCEDURE — 1160F RVW MEDS BY RX/DR IN RCRD: CPT | Mod: CPTII,S$GLB,,

## 2022-07-06 PROCEDURE — 3078F PR MOST RECENT DIASTOLIC BLOOD PRESSURE < 80 MM HG: ICD-10-PCS | Mod: CPTII,S$GLB,,

## 2022-07-06 PROCEDURE — 1160F PR REVIEW ALL MEDS BY PRESCRIBER/CLIN PHARMACIST DOCUMENTED: ICD-10-PCS | Mod: CPTII,S$GLB,,

## 2022-07-06 PROCEDURE — 1159F PR MEDICATION LIST DOCUMENTED IN MEDICAL RECORD: ICD-10-PCS | Mod: CPTII,S$GLB,,

## 2022-07-06 PROCEDURE — 99024 PR POST-OP FOLLOW-UP VISIT: ICD-10-PCS | Mod: S$GLB,,,

## 2022-07-06 PROCEDURE — 99999 PR PBB SHADOW E&M-EST. PATIENT-LVL III: CPT | Mod: PBBFAC,,,

## 2022-07-06 PROCEDURE — 3044F PR MOST RECENT HEMOGLOBIN A1C LEVEL <7.0%: ICD-10-PCS | Mod: CPTII,S$GLB,,

## 2022-07-06 NOTE — PROGRESS NOTES
[facility]  Post-operative Evaluation    SUBJECTIVE:     History of Present Illness:  Patient is a 41 y.o. female referred for hernia repair.  She reports a bulge at at her umbilicus.  She denies any severe pain but reports it is uncomfortable when pressed upon at times.  She is going to be undergoing ENT surgery soon as well.    Interval history:  Since the last clinic visit, the patient underwent open umbilical hernia repair. She has been doing well with minimal pain. She is having normal bowel movements without fevers, chills, nausea, and vomiting.     Chief Complaint   Patient presents with    Post-op Evaluation     hernia       Review of patient's allergies indicates:   Allergen Reactions    Paraffin     Pcn [penicillins] Itching     Injection only.  Can tolerate oral form    Prednisone      Other reaction(s): increased heart rate       Current Outpatient Medications   Medication Sig Dispense Refill    clindamycin (CLEOCIN) 300 MG capsule Take 1 capsule (300 mg total) by mouth every 6 (six) hours. 28 capsule 0    clobetasoL (TEMOVATE) 0.05 % cream Apply to ear canals twice daily for one week as needed. 15 g 0    fluticasone propionate (FLONASE) 50 mcg/actuation nasal spray 2 sprays (100 mcg total) by Each Nostril route once daily. 16 g 12    HYDROcodone-acetaminophen (NORCO) 5-325 mg per tablet Take 1 tablet by mouth every 6 (six) hours as needed for Pain. 18 tablet 0    ibuprofen (ADVIL,MOTRIN) 800 MG tablet Take 1 tablet (800 mg total) by mouth 3 (three) times daily as needed for Pain. 30 tablet 0    multivitamin,tx-minerals Tab Take by mouth once daily.      ondansetron (ZOFRAN-ODT) 8 MG TbDL Take 1 tablet (8 mg total) by mouth 2 (two) times daily. 12 tablet 0    EPINEPHrine (EPIPEN) 0.3 mg/0.3 mL AtIn Inject 0.3 mLs (0.3 mg total) into the muscle once. for 1 dose 2 each 11     No current facility-administered medications for this visit.     Facility-Administered Medications Ordered  in Other Visits   Medication Dose Route Frequency Provider Last Rate Last Admin    lactated ringers infusion   Intravenous Continuous Sophia Ibarra MD 10 mL/hr at 22 1119 New Bag at 22 1119       Past Medical History:   Diagnosis Date    Allergy     Asthma     Hyperlipidemia     Otitis media     Prediabetes     Sinusitis      Past Surgical History:   Procedure Laterality Date    ADENOIDECTOMY       SECTION      CYST REMOVAL Right     Under right arm    DILATION AND CURETTAGE OF UTERUS      HERNIA REPAIR  2022    HYSTERECTOMY      TONSILLECTOMY      TYMPANOPLASTY      TYMPANOSTOMY TUBE PLACEMENT      UMBILICAL HERNIA REPAIR N/A 2022    Procedure: REPAIR, HERNIA, UMBILICAL, AGE 5 YEARS OR OLDER;  Surgeon: Philipp Paiz MD;  Location: [facility] OR;  Service: General;  Laterality: N/A;     Family History   Problem Relation Age of Onset    Cancer Maternal Aunt         Skin cancer    Thyroid disease Maternal Grandmother     Hyperlipidemia Mother     Diabetes Father         DM2 insulin pump & oral    Hypertension Father     Obesity Father     Migraines Neg Hx     Asthma Neg Hx      Social History     Tobacco Use    Smoking status: Never Smoker    Smokeless tobacco: Never Used   Substance Use Topics    Alcohol use: Yes     Alcohol/week: 1.0 standard drink     Types: 1 Shots of liquor per week     Comment: Occas. last drink on 2022    Drug use: Never        Review of Systems:  Review of Systems   Constitutional: Negative for chills, fatigue, fever and unexpected weight change.   HENT: Positive for hearing loss.    Respiratory: Negative for cough, shortness of breath, wheezing and stridor.    Cardiovascular: Negative for chest pain, palpitations and leg swelling.   Gastrointestinal: Negative for abdominal distention, abdominal pain, constipation, diarrhea, nausea and vomiting.   Genitourinary: Negative for difficulty urinating, dysuria, frequency, hematuria and  urgency.   Skin: Negative for color change, pallor, rash and wound.   Hematological: Does not bruise/bleed easily.       OBJECTIVE:     Vital Signs (Most Recent)  Temp: 96.6 °F (35.9 °C) (07/06/22 1311)  Pulse: 61 (07/06/22 1311)  BP: 111/69 (07/06/22 1311)     74.7 kg (164 lb 10.9 oz)     Physical Exam:  Physical Exam  Vitals reviewed.   Constitutional:       General: She is not in acute distress.     Appearance: She is well-developed. She is not ill-appearing.   HENT:      Head: Normocephalic and atraumatic.      Right Ear: External ear normal.      Left Ear: External ear normal.   Eyes:      Extraocular Movements: Extraocular movements intact.      Conjunctiva/sclera: Conjunctivae normal.   Cardiovascular:      Rate and Rhythm: Normal rate and regular rhythm.   Pulmonary:      Effort: Pulmonary effort is normal. No respiratory distress.   Abdominal:      General: There is no distension.      Palpations: Abdomen is soft.      Tenderness: There is no abdominal tenderness.      Hernia: No hernia is present.      Comments: May have small seroma at umbilicus. Incision healing well, clean and dry without signs of infection.   Musculoskeletal:      Cervical back: Neck supple.   Skin:     General: Skin is warm and dry.   Neurological:      Mental Status: She is alert and oriented to person, place, and time.       Final Pathologic Diagnosis Hernia sac and omentum (excision):   Dense fibroconnective tissue, consistent with hernia sac   Benign fibroadipose tissue, consistent with omentum    Comment: Interp By RASHIDA Ingram         ASSESSMENT/PLAN:     41-year-old female with umbilical hernia now s/p open repair who is doing well.     - Continue no lifting over 10 pounds for 4 more weeks.  - Warm compresses for seroma. Should resolve on its own.  - RTC as needed or should any issues arise.    Annalise Barillas PA-C  [facility]

## 2022-07-07 ENCOUNTER — OFFICE VISIT (OUTPATIENT)
Dept: OTOLARYNGOLOGY | Facility: CLINIC | Age: 42
End: 2022-07-07
Payer: COMMERCIAL

## 2022-07-07 DIAGNOSIS — Z09 POSTOPERATIVE EXAMINATION: Primary | ICD-10-CM

## 2022-07-07 PROCEDURE — 99024 POSTOP FOLLOW-UP VISIT: CPT | Mod: S$GLB,,, | Performed by: OTOLARYNGOLOGY

## 2022-07-07 PROCEDURE — 3044F PR MOST RECENT HEMOGLOBIN A1C LEVEL <7.0%: ICD-10-PCS | Mod: CPTII,S$GLB,, | Performed by: OTOLARYNGOLOGY

## 2022-07-07 PROCEDURE — 3044F HG A1C LEVEL LT 7.0%: CPT | Mod: CPTII,S$GLB,, | Performed by: OTOLARYNGOLOGY

## 2022-07-07 PROCEDURE — 99999 PR PBB SHADOW E&M-EST. PATIENT-LVL II: CPT | Mod: PBBFAC,,, | Performed by: OTOLARYNGOLOGY

## 2022-07-07 PROCEDURE — 1159F PR MEDICATION LIST DOCUMENTED IN MEDICAL RECORD: ICD-10-PCS | Mod: CPTII,S$GLB,, | Performed by: OTOLARYNGOLOGY

## 2022-07-07 PROCEDURE — 99999 PR PBB SHADOW E&M-EST. PATIENT-LVL II: ICD-10-PCS | Mod: PBBFAC,,, | Performed by: OTOLARYNGOLOGY

## 2022-07-07 PROCEDURE — 1159F MED LIST DOCD IN RCRD: CPT | Mod: CPTII,S$GLB,, | Performed by: OTOLARYNGOLOGY

## 2022-07-07 PROCEDURE — 99024 PR POST-OP FOLLOW-UP VISIT: ICD-10-PCS | Mod: S$GLB,,, | Performed by: OTOLARYNGOLOGY

## 2022-07-08 NOTE — PROGRESS NOTES
Steri strips removed. No evidence of hematoma or seroma. No evidence of infection.     F/u 1 mo for activation  Answers for HPI/ROS submitted by the patient on 7/2/2022  None of these: Yes  ear discharge: Yes  None of these : Yes  None of these: Yes  None of these : Yes  heartburn: Yes  Urinating too frequently?: Yes  None of these: Yes  None of these : Yes  Seasonal Allergies?: Yes  Cold all of the time? : Yes  headaches: Yes  None of these: Yes  Feeling depressed?: Yes  sleep disturbance: Yes

## 2022-07-11 ENCOUNTER — PATIENT MESSAGE (OUTPATIENT)
Dept: SURGERY | Facility: CLINIC | Age: 42
End: 2022-07-11
Payer: COMMERCIAL

## 2022-07-11 ENCOUNTER — TELEPHONE (OUTPATIENT)
Dept: SURGERY | Facility: CLINIC | Age: 42
End: 2022-07-11
Payer: COMMERCIAL

## 2022-07-11 ENCOUNTER — CLINICAL SUPPORT (OUTPATIENT)
Dept: ALLERGY | Facility: CLINIC | Age: 42
End: 2022-07-11
Payer: COMMERCIAL

## 2022-07-11 DIAGNOSIS — J30.89 ALLERGIC RHINITIS DUE TO DERMATOPHAGOIDES FARINAE: ICD-10-CM

## 2022-07-11 DIAGNOSIS — J30.1 ACUTE ALLERGIC RHINITIS DUE TO POLLEN: ICD-10-CM

## 2022-07-11 DIAGNOSIS — J30.89 ALLERGIC RHINITIS DUE TO DERMATOPHAGOIDES PTERONYSSINUS: ICD-10-CM

## 2022-07-11 PROCEDURE — 95115 IMMUNOTHERAPY ONE INJECTION: CPT | Mod: S$GLB,,, | Performed by: ALLERGY & IMMUNOLOGY

## 2022-07-11 PROCEDURE — 99999 PR PBB SHADOW E&M-EST. PATIENT-LVL I: ICD-10-PCS | Mod: PBBFAC,,,

## 2022-07-11 PROCEDURE — 99999 PR PBB SHADOW E&M-EST. PATIENT-LVL I: CPT | Mod: PBBFAC,,,

## 2022-07-11 PROCEDURE — 99499 UNLISTED E&M SERVICE: CPT | Mod: S$GLB,,, | Performed by: ALLERGY & IMMUNOLOGY

## 2022-07-11 PROCEDURE — 99499 NO LOS: ICD-10-PCS | Mod: S$GLB,,, | Performed by: ALLERGY & IMMUNOLOGY

## 2022-07-11 PROCEDURE — 95115 PR IMMUNOTHERAPY, ONE INJECTION: ICD-10-PCS | Mod: S$GLB,,, | Performed by: ALLERGY & IMMUNOLOGY

## 2022-07-11 NOTE — TELEPHONE ENCOUNTER
----- Message from Jean Carlos Santiago sent at 7/11/2022  2:09 PM CDT -----  Contact: Duane Zepeda would like a call back at [phone] in regards to concerns about stomach ache that she believes is related to her surgery on 6/24/22.   Thanks

## 2022-07-15 ENCOUNTER — OFFICE VISIT (OUTPATIENT)
Dept: INTERNAL MEDICINE | Facility: CLINIC | Age: 42
End: 2022-07-15
Payer: COMMERCIAL

## 2022-07-15 ENCOUNTER — HOSPITAL ENCOUNTER (OUTPATIENT)
Dept: RADIOLOGY | Facility: HOSPITAL | Age: 42
Discharge: HOME | End: 2022-07-15
Attending: FAMILY MEDICINE
Payer: COMMERCIAL

## 2022-07-15 VITALS
SYSTOLIC BLOOD PRESSURE: 128 MMHG | BODY MASS INDEX: 29.14 KG/M2 | WEIGHT: 164.44 LBS | OXYGEN SATURATION: 98 % | HEIGHT: 63 IN | TEMPERATURE: 97 F | DIASTOLIC BLOOD PRESSURE: 76 MMHG | HEART RATE: 83 BPM

## 2022-07-15 DIAGNOSIS — R10.84 GENERALIZED ABDOMINAL PAIN: Primary | ICD-10-CM

## 2022-07-15 DIAGNOSIS — R10.84 GENERALIZED ABDOMINAL PAIN: ICD-10-CM

## 2022-07-15 PROCEDURE — 3074F PR MOST RECENT SYSTOLIC BLOOD PRESSURE < 130 MM HG: ICD-10-PCS | Mod: CPTII,S$GLB,, | Performed by: FAMILY MEDICINE

## 2022-07-15 PROCEDURE — 1159F PR MEDICATION LIST DOCUMENTED IN MEDICAL RECORD: ICD-10-PCS | Mod: CPTII,S$GLB,, | Performed by: FAMILY MEDICINE

## 2022-07-15 PROCEDURE — 1159F MED LIST DOCD IN RCRD: CPT | Mod: CPTII,S$GLB,, | Performed by: FAMILY MEDICINE

## 2022-07-15 PROCEDURE — 74019 RADEX ABDOMEN 2 VIEWS: CPT | Mod: TC,PO

## 2022-07-15 PROCEDURE — 74019 XR ABDOMEN FLAT AND ERECT: ICD-10-PCS | Mod: 26,,, | Performed by: RADIOLOGY

## 2022-07-15 PROCEDURE — 3078F PR MOST RECENT DIASTOLIC BLOOD PRESSURE < 80 MM HG: ICD-10-PCS | Mod: CPTII,S$GLB,, | Performed by: FAMILY MEDICINE

## 2022-07-15 PROCEDURE — 74019 RADEX ABDOMEN 2 VIEWS: CPT | Mod: 26,,, | Performed by: RADIOLOGY

## 2022-07-15 PROCEDURE — 3008F BODY MASS INDEX DOCD: CPT | Mod: CPTII,S$GLB,, | Performed by: FAMILY MEDICINE

## 2022-07-15 PROCEDURE — 3008F PR BODY MASS INDEX (BMI) DOCUMENTED: ICD-10-PCS | Mod: CPTII,S$GLB,, | Performed by: FAMILY MEDICINE

## 2022-07-15 PROCEDURE — 99999 PR PBB SHADOW E&M-EST. PATIENT-LVL IV: CPT | Mod: PBBFAC,,, | Performed by: FAMILY MEDICINE

## 2022-07-15 PROCEDURE — 99213 PR OFFICE/OUTPT VISIT, EST, LEVL III, 20-29 MIN: ICD-10-PCS | Mod: S$GLB,,, | Performed by: FAMILY MEDICINE

## 2022-07-15 PROCEDURE — 99213 OFFICE O/P EST LOW 20 MIN: CPT | Mod: S$GLB,,, | Performed by: FAMILY MEDICINE

## 2022-07-15 PROCEDURE — 3044F HG A1C LEVEL LT 7.0%: CPT | Mod: CPTII,S$GLB,, | Performed by: FAMILY MEDICINE

## 2022-07-15 PROCEDURE — 3044F PR MOST RECENT HEMOGLOBIN A1C LEVEL <7.0%: ICD-10-PCS | Mod: CPTII,S$GLB,, | Performed by: FAMILY MEDICINE

## 2022-07-15 PROCEDURE — 3078F DIAST BP <80 MM HG: CPT | Mod: CPTII,S$GLB,, | Performed by: FAMILY MEDICINE

## 2022-07-15 PROCEDURE — 3074F SYST BP LT 130 MM HG: CPT | Mod: CPTII,S$GLB,, | Performed by: FAMILY MEDICINE

## 2022-07-15 PROCEDURE — 99999 PR PBB SHADOW E&M-EST. PATIENT-LVL IV: ICD-10-PCS | Mod: PBBFAC,,, | Performed by: FAMILY MEDICINE

## 2022-07-15 NOTE — PATIENT INSTRUCTIONS
Get gas-x over the counter. Drink plenty of fluids. Also I recommend using probiotics to replenish your good bacteria.

## 2022-07-17 NOTE — PROGRESS NOTES
Subjective:      Patient ID: Duane Romero is a 41 y.o. female.    Chief Complaint: GI Problem      Patient reports generalized abdominal discomfort.  Reports recent course of clindamycin, had also been taking fiber supplement but recently stopped.  No change in pain postprandially.    GI Problem  The primary symptoms include abdominal pain. Primary symptoms do not include nausea, vomiting or diarrhea.   The illness does not include constipation.     Review of Systems   Gastrointestinal: Positive for abdominal pain. Negative for abdominal distention, anal bleeding, constipation, diarrhea, nausea and vomiting.     Past Medical History:   Diagnosis Date    Allergy     Asthma     Hyperlipidemia     Otitis media     Prediabetes     Sinusitis           Past Surgical History:   Procedure Laterality Date    ADENOIDECTOMY       SECTION      CYST REMOVAL Right     Under right arm    DILATION AND CURETTAGE OF UTERUS      HERNIA REPAIR  2022    HYSTERECTOMY      IMPLANTATION OF BONE ANCHORED HEARING AID (BAHA) Bilateral 2022    Procedure: INSERTION, BAHA;  Surgeon: Jeff Kelley MD;  Location: [facility] OR;  Service: ENT;  Laterality: Bilateral;    TONSILLECTOMY      TYMPANOPLASTY      TYMPANOSTOMY TUBE PLACEMENT      UMBILICAL HERNIA REPAIR N/A 2022    Procedure: REPAIR, HERNIA, UMBILICAL, AGE 5 YEARS OR OLDER;  Surgeon: Philipp Paiz MD;  Location: [facility] OR;  Service: General;  Laterality: N/A;     Family History   Problem Relation Age of Onset    Cancer Maternal Aunt         Skin cancer    Thyroid disease Maternal Grandmother     Hyperlipidemia Mother     Diabetes Father         DM2 insulin pump & oral    Hypertension Father     Obesity Father     Migraines Neg Hx     Asthma Neg Hx      Social History     Socioeconomic History    Marital status:    Tobacco Use    Smoking status: Never Smoker    Smokeless tobacco: Never Used   Substance and Sexual Activity     "Alcohol use: Yes     Alcohol/week: 1.0 standard drink     Types: 1 Shots of liquor per week     Comment: Occas. last drink on 06/22/2022    Drug use: Never    Sexual activity: Not Currently   Social History Narrative    Patient goal(s): Weight of 130 lbs    Motivation: Stay healthy (prevent dz/need for rx) to enjoy outdoor activities with family or by herself    Breakfast: 1-2 sausages & 1-2 eggs with 1 slice bread; water & coffee w/ Stevia & sugar-free creamer    Lunch: Boneless chicken wings & 1 thin slice of cheesecake or 1 turkey sandwich on white bread, 1 slice of American cheese; water    Dinner: Hamburger (90:10 or 80:20 meat) with cheese & bread or chicken; water or wine 5.5 oz    Snacks: Apples or 1-2 small bag of chips daily (160 jamaal)    Eating out: 3-4x/wk    Water (oz/day): 102 oz/d    Physical Activity: 35-50 min/d of resistance/aerobic & 4-5d/wk     Strategies: Lose it radha (smaller portions; lower carb)     Review of patient's allergies indicates:   Allergen Reactions    Paraffin     Pcn [penicillins] Itching     Injection only.  Can tolerate oral form    Prednisone      Other reaction(s): increased heart rate       Objective:       /76 (BP Location: Left arm)   Pulse 83   Temp 96.9 °F (36.1 °C) (Tympanic)   Ht 5' 3" (1.6 m)   Wt 74.6 kg (164 lb 7.4 oz)   LMP 05/30/2021   SpO2 98%   BMI 29.13 kg/m²   Physical Exam  Vitals and nursing note reviewed.   Constitutional:       General: She is not in acute distress.     Appearance: Normal appearance. She is well-developed. She is not diaphoretic.   Cardiovascular:      Rate and Rhythm: Normal rate and regular rhythm.      Heart sounds: Normal heart sounds.   Pulmonary:      Effort: Pulmonary effort is normal. No respiratory distress.      Breath sounds: Normal breath sounds.   Abdominal:      General: Bowel sounds are normal.      Palpations: Abdomen is soft.      Tenderness: There is abdominal tenderness (mild generalize). There is no " guarding or rebound.   Neurological:      Mental Status: She is alert.   Psychiatric:         Mood and Affect: Mood normal.         Behavior: Behavior normal.         Thought Content: Thought content normal.         Judgment: Judgment normal.       Assessment:     1. Generalized abdominal pain      Plan:   Generalized abdominal pain  -     X-Ray Abdomen Flat And Erect; Future; Expected date: 07/15/2022  -     X-Ray Abdomen Flat And Erect; Future; Expected date: 07/15/2022    xray today shows large amount of stool, large gas pocket in LUQ  Recommended gas-x, increased fluid intake  Patient to contact us if pain persists or worsens.   Will plan repeat xray next week  Medication List with Changes/Refills   Current Medications    EPINEPHRINE (EPIPEN) 0.3 MG/0.3 ML ATIN    Inject 0.3 mLs (0.3 mg total) into the muscle once. for 1 dose    MULTIVITAMIN,TX-MINERALS TAB    Take by mouth once daily.   Discontinued Medications    CLINDAMYCIN (CLEOCIN) 300 MG CAPSULE    Take 1 capsule (300 mg total) by mouth every 6 (six) hours.    CLOBETASOL (TEMOVATE) 0.05 % CREAM    Apply to ear canals twice daily for one week as needed.    FLUTICASONE PROPIONATE (FLONASE) 50 MCG/ACTUATION NASAL SPRAY    2 sprays (100 mcg total) by Each Nostril route once daily.    HYDROCODONE-ACETAMINOPHEN (NORCO) 5-325 MG PER TABLET    Take 1 tablet by mouth every 6 (six) hours as needed for Pain.    IBUPROFEN (ADVIL,MOTRIN) 800 MG TABLET    Take 1 tablet (800 mg total) by mouth 3 (three) times daily as needed for Pain.    ONDANSETRON (ZOFRAN-ODT) 8 MG TBDL    Take 1 tablet (8 mg total) by mouth 2 (two) times daily.

## 2022-07-18 ENCOUNTER — PATIENT MESSAGE (OUTPATIENT)
Dept: INTERNAL MEDICINE | Facility: CLINIC | Age: 42
End: 2022-07-18
Payer: COMMERCIAL

## 2022-07-19 ENCOUNTER — HOSPITAL ENCOUNTER (OUTPATIENT)
Dept: RADIOLOGY | Facility: HOSPITAL | Age: 42
Discharge: HOME | End: 2022-07-19
Attending: FAMILY MEDICINE
Payer: COMMERCIAL

## 2022-07-19 DIAGNOSIS — R10.84 GENERALIZED ABDOMINAL PAIN: ICD-10-CM

## 2022-07-19 PROCEDURE — 74019 RADEX ABDOMEN 2 VIEWS: CPT | Mod: 26,,, | Performed by: RADIOLOGY

## 2022-07-19 PROCEDURE — 74019 XR ABDOMEN FLAT AND ERECT: ICD-10-PCS | Mod: 26,,, | Performed by: RADIOLOGY

## 2022-07-19 PROCEDURE — 74019 RADEX ABDOMEN 2 VIEWS: CPT | Mod: TC,PO

## 2022-07-20 RX ORDER — DICYCLOMINE HYDROCHLORIDE 10 MG/1
10 CAPSULE ORAL
Qty: 40 CAPSULE | Refills: 0 | Status: SHIPPED | OUTPATIENT
Start: 2022-07-20 | End: 2022-08-11

## 2022-07-22 ENCOUNTER — OFFICE VISIT (OUTPATIENT)
Dept: GASTROENTEROLOGY | Facility: CLINIC | Age: 42
End: 2022-07-22
Payer: COMMERCIAL

## 2022-07-22 VITALS
DIASTOLIC BLOOD PRESSURE: 74 MMHG | WEIGHT: 162.69 LBS | SYSTOLIC BLOOD PRESSURE: 126 MMHG | HEIGHT: 63 IN | BODY MASS INDEX: 28.82 KG/M2 | HEART RATE: 66 BPM

## 2022-07-22 DIAGNOSIS — R14.0 ABDOMINAL BLOATING: Primary | ICD-10-CM

## 2022-07-22 PROCEDURE — 99999 PR PBB SHADOW E&M-EST. PATIENT-LVL IV: CPT | Mod: PBBFAC,,, | Performed by: NURSE PRACTITIONER

## 2022-07-22 PROCEDURE — 99203 OFFICE O/P NEW LOW 30 MIN: CPT | Mod: S$GLB,,, | Performed by: NURSE PRACTITIONER

## 2022-07-22 PROCEDURE — 3044F PR MOST RECENT HEMOGLOBIN A1C LEVEL <7.0%: ICD-10-PCS | Mod: CPTII,S$GLB,, | Performed by: NURSE PRACTITIONER

## 2022-07-22 PROCEDURE — 3044F HG A1C LEVEL LT 7.0%: CPT | Mod: CPTII,S$GLB,, | Performed by: NURSE PRACTITIONER

## 2022-07-22 PROCEDURE — 3008F PR BODY MASS INDEX (BMI) DOCUMENTED: ICD-10-PCS | Mod: CPTII,S$GLB,, | Performed by: NURSE PRACTITIONER

## 2022-07-22 PROCEDURE — 1160F PR REVIEW ALL MEDS BY PRESCRIBER/CLIN PHARMACIST DOCUMENTED: ICD-10-PCS | Mod: CPTII,S$GLB,, | Performed by: NURSE PRACTITIONER

## 2022-07-22 PROCEDURE — 1159F MED LIST DOCD IN RCRD: CPT | Mod: CPTII,S$GLB,, | Performed by: NURSE PRACTITIONER

## 2022-07-22 PROCEDURE — 3074F PR MOST RECENT SYSTOLIC BLOOD PRESSURE < 130 MM HG: ICD-10-PCS | Mod: CPTII,S$GLB,, | Performed by: NURSE PRACTITIONER

## 2022-07-22 PROCEDURE — 99203 PR OFFICE/OUTPT VISIT, NEW, LEVL III, 30-44 MIN: ICD-10-PCS | Mod: S$GLB,,, | Performed by: NURSE PRACTITIONER

## 2022-07-22 PROCEDURE — 3074F SYST BP LT 130 MM HG: CPT | Mod: CPTII,S$GLB,, | Performed by: NURSE PRACTITIONER

## 2022-07-22 PROCEDURE — 1159F PR MEDICATION LIST DOCUMENTED IN MEDICAL RECORD: ICD-10-PCS | Mod: CPTII,S$GLB,, | Performed by: NURSE PRACTITIONER

## 2022-07-22 PROCEDURE — 1160F RVW MEDS BY RX/DR IN RCRD: CPT | Mod: CPTII,S$GLB,, | Performed by: NURSE PRACTITIONER

## 2022-07-22 PROCEDURE — 3008F BODY MASS INDEX DOCD: CPT | Mod: CPTII,S$GLB,, | Performed by: NURSE PRACTITIONER

## 2022-07-22 PROCEDURE — 3078F PR MOST RECENT DIASTOLIC BLOOD PRESSURE < 80 MM HG: ICD-10-PCS | Mod: CPTII,S$GLB,, | Performed by: NURSE PRACTITIONER

## 2022-07-22 PROCEDURE — 3078F DIAST BP <80 MM HG: CPT | Mod: CPTII,S$GLB,, | Performed by: NURSE PRACTITIONER

## 2022-07-22 PROCEDURE — 99999 PR PBB SHADOW E&M-EST. PATIENT-LVL IV: ICD-10-PCS | Mod: PBBFAC,,, | Performed by: NURSE PRACTITIONER

## 2022-07-22 NOTE — PROGRESS NOTES
Clinic Consult:  [facility] Consultation Note    Reason for Consult:  The encounter diagnosis was Abdominal bloating.    PCP: Des Chaney   No address on file    HPI:  This is a 41 y.o. female here for evaluation of abdominal pain.   She had two recent surgeries with hernia surgery on 22 and cochlear implants on 22. She was placed on Clindamycin after her second surgery and her pain started after that. She thought she was constipated so has been taking OTC laxatives which did cause her to have bowel movements, however, continues with pain. Her pain moves around her abdomen. Today, she is having more lower abdominal pain. She describes this pain as abdominal bloating. She has tried probiotics and gasx which have both been ineffective.     She had tow recent abdomen xrays that were unrevealing for cause.   She was given bentyl 10 mg but has not started taking it yet.     Review of Systems   Constitutional: Negative for fever, malaise/fatigue and weight loss.   HENT: Negative for sore throat.    Respiratory: Negative for cough and wheezing.    Cardiovascular: Negative for chest pain and palpitations.   Gastrointestinal: Positive for abdominal pain. Negative for blood in stool, constipation, diarrhea, heartburn, melena, nausea and vomiting.   Genitourinary: Negative for dysuria and frequency.   Musculoskeletal: Negative for back pain, joint pain, myalgias and neck pain.   Skin: Negative for itching and rash.   Neurological: Negative for dizziness, speech change, seizures, loss of consciousness and headaches.   Psychiatric/Behavioral: Negative for depression and substance abuse. The patient is not nervous/anxious.        Medical History:  has a past medical history of Allergy, Asthma, Hyperlipidemia, Otitis media, Prediabetes, and Sinusitis.    Surgical History:  has a past surgical history that includes Tympanostomy tube placement;  section; Cyst Removal (Right); Tonsillectomy;  "Adenoidectomy; Tympanoplasty; Dilation and curettage of uterus; Hysterectomy; Hernia repair (06/24/2022); Umbilical hernia repair (N/A, 6/24/2022); and Implantation of bone anchored hearing aid (BAHA) (Bilateral, 6/30/2022).    Family History: family history includes Cancer in her maternal aunt; Diabetes in her father; Hyperlipidemia in her mother; Hypertension in her father; Obesity in her father; Thyroid disease in her maternal grandmother..     Social History:  reports that she has never smoked. She has never used smokeless tobacco. She reports current alcohol use of about 1.0 standard drink of alcohol per week. She reports that she does not use drugs.    Allergies: Reviewed    Home Medications:   Current Outpatient Medications on File Prior to Visit   Medication Sig Dispense Refill    multivitamin,tx-minerals Tab Take by mouth once daily.      dicyclomine (BENTYL) 10 MG capsule Take 1 capsule (10 mg total) by mouth 4 (four) times daily before meals and nightly. (Patient not taking: Reported on 7/22/2022) 40 capsule 0    EPINEPHrine (EPIPEN) 0.3 mg/0.3 mL AtIn Inject 0.3 mLs (0.3 mg total) into the muscle once. for 1 dose 2 each 11     Current Facility-Administered Medications on File Prior to Visit   Medication Dose Route Frequency Provider Last Rate Last Admin    lactated ringers infusion   Intravenous Continuous Sophia Ibarra MD 10 mL/hr at 06/30/22 1119 New Bag at 06/30/22 1119       Physical Exam:  /74 (BP Location: Left arm, Patient Position: Sitting, BP Method: Medium (Manual))   Pulse 66   Ht 5' 3" (1.6 m)   Wt 73.8 kg (162 lb 11.2 oz)   LMP 05/30/2021   BMI 28.82 kg/m²   Body mass index is 28.82 kg/m².  Physical Exam  Constitutional:       General: She is not in acute distress.  HENT:      Head: Normocephalic and atraumatic.   Eyes:      General: No scleral icterus.     Conjunctiva/sclera: Conjunctivae normal.   Cardiovascular:      Rate and Rhythm: Normal rate and regular rhythm.      " Heart sounds: No murmur heard.  Pulmonary:      Effort: Pulmonary effort is normal. No respiratory distress.      Breath sounds: Normal breath sounds. No wheezing.   Abdominal:      General: Abdomen is flat. Bowel sounds are normal.      Palpations: Abdomen is soft.      Tenderness: There is no abdominal tenderness.   Skin:     General: Skin is warm and dry.   Neurological:      General: No focal deficit present.      Mental Status: She is alert and oriented to person, place, and time.      Cranial Nerves: No cranial nerve deficit.   Psychiatric:         Mood and Affect: Mood normal.         Judgment: Judgment normal.         Labs: Pertinent labs reviewed.  CRC Screening: NA    Assessment:  1. Abdominal bloating        Recommendations:   - start bentyl can take 20 mg   - she will notify me if symptoms do not improve and would recommend CT scan at that time.     Abdominal bloating        Follow up if symptoms worsen or fail to improve.    Thank you so much for allowing me to participate in the care of Duane PérezBETI Mena

## 2022-07-24 ENCOUNTER — PATIENT MESSAGE (OUTPATIENT)
Dept: SURGERY | Facility: CLINIC | Age: 42
End: 2022-07-24
Payer: COMMERCIAL

## 2022-07-25 ENCOUNTER — TELEPHONE (OUTPATIENT)
Dept: ALLERGY | Facility: CLINIC | Age: 42
End: 2022-07-25
Payer: COMMERCIAL

## 2022-07-25 NOTE — TELEPHONE ENCOUNTER
----- Message from Wei Steiner sent at 7/25/2022  9:29 AM CDT -----  Contact: self  Pt would like to consult with nurse regarding an appt for and injection.  Please contact Duane Romero @ [phone].  Thanks/As

## 2022-07-28 ENCOUNTER — PATIENT MESSAGE (OUTPATIENT)
Dept: AUDIOLOGY | Facility: CLINIC | Age: 42
End: 2022-07-28
Payer: COMMERCIAL

## 2022-07-29 ENCOUNTER — TELEPHONE (OUTPATIENT)
Dept: ALLERGY | Facility: CLINIC | Age: 42
End: 2022-07-29
Payer: COMMERCIAL

## 2022-08-01 NOTE — TELEPHONE ENCOUNTER
----- Message from Virginie Sexton sent at 7/29/2022  3:01 PM CDT -----  Contact: [phone]  Patient needs to reschedule her appointment for Tuesday afternoon.Please call back [phone].Thanks    
I scheduled pt for the first available 4pm appt, she states from now on this is what she will need,. If someone cancels Mon or Tues in august at 4 or later please call pt to move appt up.  
Noted from 7/29/22.  
.

## 2022-08-03 ENCOUNTER — CLINICAL SUPPORT (OUTPATIENT)
Dept: AUDIOLOGY | Facility: CLINIC | Age: 42
End: 2022-08-03
Payer: COMMERCIAL

## 2022-08-03 DIAGNOSIS — Z96.21 STATUS POST PLACEMENT OF BONE ANCHORED HEARING AID (BAHA): Primary | ICD-10-CM

## 2022-08-03 NOTE — PROGRESS NOTES
Duane Romero was seen 08/03/2022 for a Bone Anchored Hearing Aid fitting.  Patient was fitted to baha sensogram.  Patient was instructed on use and care, and demonstrated proficiency.  Patient is scheduled for a follow-up in 4 week(s).  Patient PIF baha fitting fee.     Make:     Cochlear   Model:    Osia  Right and Left  Magnet Strength:   3, lot # [id number]  Color:     all  Battery:    675  Right SN:    [id number] & spare [id number]  Left SN:    [id number] & spare [id number]  Repair Warranty:    8/3/24  Loss Warranty:    8/3/24    Accessories:   TV Streamer SN:  [id number]   Warranty:   8/3/23     MiniMic SN:  [id number]   Warranty:   8/3/23     Osia's paired to phone and Osia radha downloaded and processors paired. She did well with radha and phone streaming..  Pulled all sounds down to far left, softest, own voice, comfort, etc.in express fit. Then in detailed fit, reduced each scenario on wheel by -1 and reduced speech (soft and loud, by -2).   These changes were done in Everyday and Speech in Noise program     Programs added: Speech in noise and Outdoor

## 2022-08-07 ENCOUNTER — PATIENT MESSAGE (OUTPATIENT)
Dept: AUDIOLOGY | Facility: CLINIC | Age: 42
End: 2022-08-07
Payer: COMMERCIAL

## 2022-08-09 ENCOUNTER — CLINICAL SUPPORT (OUTPATIENT)
Dept: AUDIOLOGY | Facility: CLINIC | Age: 42
End: 2022-08-09
Payer: COMMERCIAL

## 2022-08-09 DIAGNOSIS — Z96.21 STATUS POST PLACEMENT OF BONE ANCHORED HEARING AID (BAHA): Primary | ICD-10-CM

## 2022-08-10 NOTE — PROGRESS NOTES
Duane Romero was seen 08/09/2022 for a Bone Anchored Hearing Aid follow-up due to a constant bilateral internal static noise that she has been hearing since her fitting last week. The noise is worse in the left processor. It was recommended by Bailey, the cochlear rep, to turn the low pass filter on for both aids. Unfortunately, I did not have the latest version of Osia software. Jacky was available to download [email or web address].BringMeThat and install for me. The following changes were made:  · Audio off set was turned on and set to Standard for the right   · Audio off set was turned on and set to Standard +2 for the left  · Low pass filter was turned on for both sides  · These changes were saved    After these changes were made, the static was no longer there in either processor.      She reports having headaches after only a few minutes of wear time and the headache remains until the processors are removed. We experimented with different magnet strengths. She is currently in a pair of 3s. She prefers 2, but I only have one of those in the clinic so she will wear a 1 right and a 2 left. I will have Bailey send me a new pair of 1 and 2s.     She has changes her batteries 3 times since I fit her last week. I changed them again today. Unfortunately the battery life for Osia is around 3 days.     Patient pleased and I will call her as soon as the #2s arrive and I will have her  bring processors to me to replace the magnets so that she does not have to take off or drive over after work from [place].

## 2022-08-23 ENCOUNTER — OFFICE VISIT (OUTPATIENT)
Dept: INTERNAL MEDICINE | Facility: CLINIC | Age: 42
End: 2022-08-23
Payer: COMMERCIAL

## 2022-08-23 VITALS
HEIGHT: 63 IN | WEIGHT: 166.44 LBS | DIASTOLIC BLOOD PRESSURE: 80 MMHG | OXYGEN SATURATION: 98 % | BODY MASS INDEX: 29.49 KG/M2 | TEMPERATURE: 98 F | HEART RATE: 77 BPM | SYSTOLIC BLOOD PRESSURE: 116 MMHG

## 2022-08-23 DIAGNOSIS — R21 RASH: Primary | ICD-10-CM

## 2022-08-23 PROCEDURE — 3074F PR MOST RECENT SYSTOLIC BLOOD PRESSURE < 130 MM HG: ICD-10-PCS | Mod: CPTII,S$GLB,, | Performed by: FAMILY MEDICINE

## 2022-08-23 PROCEDURE — 1159F PR MEDICATION LIST DOCUMENTED IN MEDICAL RECORD: ICD-10-PCS | Mod: CPTII,S$GLB,, | Performed by: FAMILY MEDICINE

## 2022-08-23 PROCEDURE — 3044F HG A1C LEVEL LT 7.0%: CPT | Mod: CPTII,S$GLB,, | Performed by: FAMILY MEDICINE

## 2022-08-23 PROCEDURE — 3044F PR MOST RECENT HEMOGLOBIN A1C LEVEL <7.0%: ICD-10-PCS | Mod: CPTII,S$GLB,, | Performed by: FAMILY MEDICINE

## 2022-08-23 PROCEDURE — 3008F BODY MASS INDEX DOCD: CPT | Mod: CPTII,S$GLB,, | Performed by: FAMILY MEDICINE

## 2022-08-23 PROCEDURE — 99999 PR PBB SHADOW E&M-EST. PATIENT-LVL III: CPT | Mod: PBBFAC,,, | Performed by: FAMILY MEDICINE

## 2022-08-23 PROCEDURE — 99999 PR PBB SHADOW E&M-EST. PATIENT-LVL III: ICD-10-PCS | Mod: PBBFAC,,, | Performed by: FAMILY MEDICINE

## 2022-08-23 PROCEDURE — 3008F PR BODY MASS INDEX (BMI) DOCUMENTED: ICD-10-PCS | Mod: CPTII,S$GLB,, | Performed by: FAMILY MEDICINE

## 2022-08-23 PROCEDURE — 99213 OFFICE O/P EST LOW 20 MIN: CPT | Mod: S$GLB,,, | Performed by: FAMILY MEDICINE

## 2022-08-23 PROCEDURE — 3079F PR MOST RECENT DIASTOLIC BLOOD PRESSURE 80-89 MM HG: ICD-10-PCS | Mod: CPTII,S$GLB,, | Performed by: FAMILY MEDICINE

## 2022-08-23 PROCEDURE — 1159F MED LIST DOCD IN RCRD: CPT | Mod: CPTII,S$GLB,, | Performed by: FAMILY MEDICINE

## 2022-08-23 PROCEDURE — 3079F DIAST BP 80-89 MM HG: CPT | Mod: CPTII,S$GLB,, | Performed by: FAMILY MEDICINE

## 2022-08-23 PROCEDURE — 99213 PR OFFICE/OUTPT VISIT, EST, LEVL III, 20-29 MIN: ICD-10-PCS | Mod: S$GLB,,, | Performed by: FAMILY MEDICINE

## 2022-08-23 PROCEDURE — 3074F SYST BP LT 130 MM HG: CPT | Mod: CPTII,S$GLB,, | Performed by: FAMILY MEDICINE

## 2022-08-23 RX ORDER — TRIAMCINOLONE ACETONIDE 1 MG/G
OINTMENT TOPICAL 2 TIMES DAILY
Qty: 30 G | Refills: 1 | Status: SHIPPED | OUTPATIENT
Start: 2022-08-23

## 2022-08-24 NOTE — PROGRESS NOTES
Subjective:      Patient ID: Duane Romero is a 41 y.o. female.    Chief Complaint: Rash      Patient reports pruritic rash on chest - has had for about a week now.    Rash  This is a new problem. The current episode started 1 to 4 weeks ago. The problem has been waxing and waning since onset. The affected locations include the chest. The rash is characterized by itchiness. She was exposed to nothing.     Review of Systems   Skin: Positive for rash.     Past Medical History:   Diagnosis Date    Allergy     Asthma     Hyperlipidemia     Otitis media     Prediabetes     Sinusitis           Past Surgical History:   Procedure Laterality Date    ADENOIDECTOMY       SECTION      CYST REMOVAL Right     Under right arm    DILATION AND CURETTAGE OF UTERUS      HERNIA REPAIR  2022    HYSTERECTOMY      IMPLANTATION OF BONE ANCHORED HEARING AID (BAHA) Bilateral 2022    Procedure: INSERTION, BAHA;  Surgeon: Jeff Kelley MD;  Location: [facility] OR;  Service: ENT;  Laterality: Bilateral;    TONSILLECTOMY      TYMPANOPLASTY      TYMPANOSTOMY TUBE PLACEMENT      UMBILICAL HERNIA REPAIR N/A 2022    Procedure: REPAIR, HERNIA, UMBILICAL, AGE 5 YEARS OR OLDER;  Surgeon: Philipp Paiz MD;  Location: [facility] OR;  Service: General;  Laterality: N/A;     Family History   Problem Relation Age of Onset    Cancer Maternal Aunt         Skin cancer    Thyroid disease Maternal Grandmother     Hyperlipidemia Mother     Diabetes Father         DM2 insulin pump & oral    Hypertension Father     Obesity Father     Migraines Neg Hx     Asthma Neg Hx      Social History     Socioeconomic History    Marital status:    Tobacco Use    Smoking status: Never Smoker    Smokeless tobacco: Never Used   Substance and Sexual Activity    Alcohol use: Yes     Alcohol/week: 1.0 standard drink     Types: 1 Shots of liquor per week     Comment: Occas. last drink on 2022    Drug use: Never     "Sexual activity: Not Currently   Social History Narrative    Patient goal(s): Weight of 130 lbs    Motivation: Stay healthy (prevent dz/need for rx) to enjoy outdoor activities with family or by herself    Breakfast: 1-2 sausages & 1-2 eggs with 1 slice bread; water & coffee w/ Stevia & sugar-free creamer    Lunch: Boneless chicken wings & 1 thin slice of cheesecake or 1 turkey sandwich on white bread, 1 slice of American cheese; water    Dinner: Hamburger (90:10 or 80:20 meat) with cheese & bread or chicken; water or wine 5.5 oz    Snacks: Apples or 1-2 small bag of chips daily (160 jamaal)    Eating out: 3-4x/wk    Water (oz/day): 102 oz/d    Physical Activity: 35-50 min/d of resistance/aerobic & 4-5d/wk     Strategies: Lose it radha (smaller portions; lower carb)     Review of patient's allergies indicates:   Allergen Reactions    Paraffin     Pcn [penicillins] Itching     Injection only.  Can tolerate oral form    Prednisone      Other reaction(s): increased heart rate       Objective:       /80 (BP Location: Right arm, Patient Position: Sitting, BP Method: Large (Manual))   Pulse 77   Temp 98.1 °F (36.7 °C) (Tympanic)   Ht 5' 3" (1.6 m)   Wt 75.5 kg (166 lb 7.2 oz)   LMP 05/30/2021   SpO2 98%   BMI 29.48 kg/m²   Physical Exam  Constitutional:       General: She is not in acute distress.     Appearance: Normal appearance. She is well-developed. She is not ill-appearing or diaphoretic.   Skin:     Findings: Rash (anterior chest wall with erythematous papules, excoriations noted) present.   Neurological:      Mental Status: She is alert and oriented to person, place, and time.   Psychiatric:         Mood and Affect: Mood normal.         Behavior: Behavior normal.         Thought Content: Thought content normal.         Judgment: Judgment normal.       Assessment:     1. Rash      Plan:   Rash    Other orders  -     triamcinolone acetonide 0.1% (KENALOG) 0.1 % ointment; Apply topically 2 (two) times " daily.  Dispense: 30 g; Refill: 1    she will let me know if rash worsens or does not improve  Medication List with Changes/Refills   New Medications    TRIAMCINOLONE ACETONIDE 0.1% (KENALOG) 0.1 % OINTMENT    Apply topically 2 (two) times daily.   Current Medications    DICYCLOMINE (BENTYL) 10 MG CAPSULE    TAKE 1 CAPSULE BY MOUTH 4 TIMES DAILY BEFORE MEALS AND NIGHTLY.    EPINEPHRINE (EPIPEN) 0.3 MG/0.3 ML ATIN    Inject 0.3 mLs (0.3 mg total) into the muscle once. for 1 dose    MULTIVITAMIN,TX-MINERALS TAB    Take by mouth once daily.

## 2022-09-01 ENCOUNTER — CLINICAL SUPPORT (OUTPATIENT)
Dept: AUDIOLOGY | Facility: CLINIC | Age: 42
End: 2022-09-01
Payer: COMMERCIAL

## 2022-09-01 DIAGNOSIS — Z96.21 STATUS POST PLACEMENT OF BONE ANCHORED HEARING AID (BAHA): Primary | ICD-10-CM

## 2022-09-01 PROCEDURE — 99999 PR PBB SHADOW E&M-EST. PATIENT-LVL I: CPT | Mod: PBBFAC,,, | Performed by: AUDIOLOGIST

## 2022-09-01 PROCEDURE — 99999 PR PBB SHADOW E&M-EST. PATIENT-LVL I: ICD-10-PCS | Mod: PBBFAC,,, | Performed by: AUDIOLOGIST

## 2022-09-01 NOTE — PROGRESS NOTES
"Duane Maryse Romero was seen 09/01/2022 for an Osia Bone conduction Hearing Aid follow-up. She is doing well with her processors but is having a few issues.     First, we switched out her #1 strength magnet for a #2 on the right side. After a few minutes, she decided she wanted to go back to the #1. She will remain in #1-right and #2 left. She is keeping tether on at all times.    Her issues are:  The left processor cuts out at random times and lasts varying lengths of time (usually 5-20 seconds) only while streaming media, never while on the phone. She now disables streaming to the left processor and only streams to the right.   When on the phone, occasionally she has to turn off streaming all together because external environmental noise is too loud.  I am not sure what could be causing these issues, but I will follow-up with [facility] for some technical advice.     We worked with her notification settings on her Iphone to turn off sounds in a few apps and she understands how to go through them. She feels a slight "pull" when receiving a text or other type of alert. In the office she received an alert from [employer].     We reviewed her Osia radha in detail.    She is still acclimating to new sounds and is not interested in any adjustments today regarding increasing gain.     We reviewed the Aqua kits and she understands she must switch out the zinc air batteries with regular alkaline batteries before using.     I will reach out to [facility] and let Maryse know what I find out. She will continue to troubleshoot and make notes on current or any new issues as they arise.    "

## 2022-09-09 ENCOUNTER — PATIENT MESSAGE (OUTPATIENT)
Dept: AUDIOLOGY | Facility: CLINIC | Age: 42
End: 2022-09-09
Payer: COMMERCIAL

## 2022-09-29 ENCOUNTER — PATIENT MESSAGE (OUTPATIENT)
Dept: AUDIOLOGY | Facility: CLINIC | Age: 42
End: 2022-09-29
Payer: COMMERCIAL

## 2022-11-22 ENCOUNTER — OFFICE VISIT (OUTPATIENT)
Dept: INTERNAL MEDICINE | Facility: CLINIC | Age: 42
End: 2022-11-22
Payer: COMMERCIAL

## 2022-11-22 ENCOUNTER — PATIENT MESSAGE (OUTPATIENT)
Dept: INTERNAL MEDICINE | Facility: CLINIC | Age: 42
End: 2022-11-22

## 2022-11-22 VITALS
HEART RATE: 66 BPM | HEIGHT: 63 IN | SYSTOLIC BLOOD PRESSURE: 116 MMHG | BODY MASS INDEX: 30.46 KG/M2 | WEIGHT: 171.94 LBS | DIASTOLIC BLOOD PRESSURE: 82 MMHG | TEMPERATURE: 98 F | OXYGEN SATURATION: 98 %

## 2022-11-22 DIAGNOSIS — R41.840 DIFFICULTY CONCENTRATING: Primary | ICD-10-CM

## 2022-11-22 DIAGNOSIS — R63.5 WEIGHT GAIN: ICD-10-CM

## 2022-11-22 PROCEDURE — 99213 PR OFFICE/OUTPT VISIT, EST, LEVL III, 20-29 MIN: ICD-10-PCS | Mod: S$GLB,,, | Performed by: FAMILY MEDICINE

## 2022-11-22 PROCEDURE — 3079F DIAST BP 80-89 MM HG: CPT | Mod: CPTII,S$GLB,, | Performed by: FAMILY MEDICINE

## 2022-11-22 PROCEDURE — 99999 PR PBB SHADOW E&M-EST. PATIENT-LVL III: ICD-10-PCS | Mod: PBBFAC,,, | Performed by: FAMILY MEDICINE

## 2022-11-22 PROCEDURE — 1159F MED LIST DOCD IN RCRD: CPT | Mod: CPTII,S$GLB,, | Performed by: FAMILY MEDICINE

## 2022-11-22 PROCEDURE — 3074F SYST BP LT 130 MM HG: CPT | Mod: CPTII,S$GLB,, | Performed by: FAMILY MEDICINE

## 2022-11-22 PROCEDURE — 99213 OFFICE O/P EST LOW 20 MIN: CPT | Mod: S$GLB,,, | Performed by: FAMILY MEDICINE

## 2022-11-22 PROCEDURE — 3008F PR BODY MASS INDEX (BMI) DOCUMENTED: ICD-10-PCS | Mod: CPTII,S$GLB,, | Performed by: FAMILY MEDICINE

## 2022-11-22 PROCEDURE — 3044F PR MOST RECENT HEMOGLOBIN A1C LEVEL <7.0%: ICD-10-PCS | Mod: CPTII,S$GLB,, | Performed by: FAMILY MEDICINE

## 2022-11-22 PROCEDURE — 1159F PR MEDICATION LIST DOCUMENTED IN MEDICAL RECORD: ICD-10-PCS | Mod: CPTII,S$GLB,, | Performed by: FAMILY MEDICINE

## 2022-11-22 PROCEDURE — 3074F PR MOST RECENT SYSTOLIC BLOOD PRESSURE < 130 MM HG: ICD-10-PCS | Mod: CPTII,S$GLB,, | Performed by: FAMILY MEDICINE

## 2022-11-22 PROCEDURE — 3044F HG A1C LEVEL LT 7.0%: CPT | Mod: CPTII,S$GLB,, | Performed by: FAMILY MEDICINE

## 2022-11-22 PROCEDURE — 99999 PR PBB SHADOW E&M-EST. PATIENT-LVL III: CPT | Mod: PBBFAC,,, | Performed by: FAMILY MEDICINE

## 2022-11-22 PROCEDURE — 3079F PR MOST RECENT DIASTOLIC BLOOD PRESSURE 80-89 MM HG: ICD-10-PCS | Mod: CPTII,S$GLB,, | Performed by: FAMILY MEDICINE

## 2022-11-22 PROCEDURE — 3008F BODY MASS INDEX DOCD: CPT | Mod: CPTII,S$GLB,, | Performed by: FAMILY MEDICINE

## 2022-11-22 NOTE — PROGRESS NOTES
Subjective:      Patient ID: Duane Romero is a 42 y.o. female.    Chief Complaint: ADHD and ADD      Patient reports she is currently in school in addition to working full-time.  She is having increased difficulty with focusing and concentration.  Reports she was on medication for ADD when she was a child in elementary school, does not remember very many details about this.  Also reports somewhat discouraged about weight gain, does report having been more lax with her diet then she was previously.  On review of chart has gained 5 lb in the past 6 months    Review of Systems   Constitutional:  Positive for unexpected weight change. Negative for activity change.   HENT:  Positive for hearing loss. Negative for rhinorrhea and trouble swallowing.    Eyes:  Negative for discharge and visual disturbance.   Respiratory:  Negative for chest tightness and wheezing.    Cardiovascular:  Negative for chest pain and palpitations.   Gastrointestinal:  Negative for blood in stool, constipation, diarrhea and vomiting.   Endocrine: Negative for polydipsia and polyuria.   Genitourinary:  Negative for difficulty urinating, dysuria, hematuria and menstrual problem.   Musculoskeletal:  Negative for arthralgias, joint swelling and neck pain.   Neurological:  Negative for weakness and headaches.   Psychiatric/Behavioral:  Positive for decreased concentration. Negative for confusion, dysphoric mood and sleep disturbance. The patient is not nervous/anxious.    Past Medical History:   Diagnosis Date    Allergy     Asthma     Hyperlipidemia     Otitis media     Prediabetes     Sinusitis           Past Surgical History:   Procedure Laterality Date    ADENOIDECTOMY       SECTION      CYST REMOVAL Right     Under right arm    DILATION AND CURETTAGE OF UTERUS      HERNIA REPAIR  2022    HYSTERECTOMY      IMPLANTATION OF BONE ANCHORED HEARING AID (BAHA) Bilateral 2022    Procedure: INSERTION, BAHA;  Surgeon: Jeff  MD Warren;  Location: [facility];  Service: ENT;  Laterality: Bilateral;    TONSILLECTOMY      TYMPANOPLASTY      TYMPANOSTOMY TUBE PLACEMENT      UMBILICAL HERNIA REPAIR N/A 6/24/2022    Procedure: REPAIR, HERNIA, UMBILICAL, AGE 5 YEARS OR OLDER;  Surgeon: Philipp Paiz MD;  Location: [facility];  Service: General;  Laterality: N/A;     Family History   Problem Relation Age of Onset    Cancer Maternal Aunt         Skin cancer    Thyroid disease Maternal Grandmother     Hyperlipidemia Mother     Diabetes Father         DM2 insulin pump & oral    Hypertension Father     Obesity Father     Migraines Neg Hx     Asthma Neg Hx      Social History     Socioeconomic History    Marital status:    Tobacco Use    Smoking status: Never    Smokeless tobacco: Never   Substance and Sexual Activity    Alcohol use: Yes     Alcohol/week: 1.0 standard drink     Types: 1 Shots of liquor per week     Comment: Occas. last drink on 06/22/2022    Drug use: Never    Sexual activity: Not Currently   Social History Narrative    Patient goal(s): Weight of 130 lbs    Motivation: Stay healthy (prevent dz/need for rx) to enjoy outdoor activities with family or by herself    Breakfast: 1-2 sausages & 1-2 eggs with 1 slice bread; water & coffee w/ Stevia & sugar-free creamer    Lunch: Boneless chicken wings & 1 thin slice of cheesecake or 1 turkey sandwich on white bread, 1 slice of American cheese; water    Dinner: Hamburger (90:10 or 80:20 meat) with cheese & bread or chicken; water or wine 5.5 oz    Snacks: Apples or 1-2 small bag of chips daily (160 jamaal)    Eating out: 3-4x/wk    Water (oz/day): 102 oz/d    Physical Activity: 35-50 min/d of resistance/aerobic & 4-5d/wk     Strategies: Lose it radha (smaller portions; lower carb)     Review of patient's allergies indicates:   Allergen Reactions    Paraffin     Pcn [penicillins] Itching     Injection only.  Can tolerate oral form    Prednisone      Other reaction(s): increased heart rate  "      Objective:       /82 (BP Location: Right arm, Patient Position: Sitting, BP Method: Large (Manual))   Pulse 66   Temp 97.6 °F (36.4 °C) (Temporal)   Ht 5' 3" (1.6 m)   Wt 78 kg (171 lb 15.3 oz)   LMP 05/30/2021   SpO2 98%   BMI 30.46 kg/m²   Physical Exam  Constitutional:       General: She is not in acute distress.     Appearance: Normal appearance. She is well-developed. She is not ill-appearing or diaphoretic.   Cardiovascular:      Rate and Rhythm: Normal rate and regular rhythm.      Heart sounds: Normal heart sounds.   Pulmonary:      Effort: Pulmonary effort is normal.      Breath sounds: Normal breath sounds.   Neurological:      Mental Status: She is alert and oriented to person, place, and time.   Psychiatric:         Mood and Affect: Mood normal.         Behavior: Behavior normal.         Thought Content: Thought content normal.         Judgment: Judgment normal.     Assessment:     1. Difficulty concentrating    2. Weight gain      Plan:   Difficulty concentrating  -     Ambulatory referral/consult to Psychiatry; Future; Expected date: 11/29/2022    Weight gain    Referral sent to [facility] for formal evaluation.  She is planning to work more on her diet, discussed possibility of using GLP 1, will consider this in future if needed.  Medication List with Changes/Refills   Current Medications    EPINEPHRINE (EPIPEN) 0.3 MG/0.3 ML ATIN    Inject 0.3 mLs (0.3 mg total) into the muscle once. for 1 dose    MULTIVITAMIN,TX-MINERALS TAB    Take by mouth once daily.    TRIAMCINOLONE ACETONIDE 0.1% (KENALOG) 0.1 % OINTMENT    Apply topically 2 (two) times daily.   Discontinued Medications    DICYCLOMINE (BENTYL) 10 MG CAPSULE    TAKE 1 CAPSULE BY MOUTH 4 TIMES DAILY BEFORE MEALS AND NIGHTLY.       "

## 2022-12-05 ENCOUNTER — PATIENT MESSAGE (OUTPATIENT)
Dept: INTERNAL MEDICINE | Facility: CLINIC | Age: 42
End: 2022-12-05
Payer: COMMERCIAL

## 2022-12-06 RX ORDER — TIRZEPATIDE 2.5 MG/.5ML
2.5 INJECTION, SOLUTION SUBCUTANEOUS
Qty: 4 PEN | Refills: 0 | Status: SHIPPED | OUTPATIENT
Start: 2022-12-06 | End: 2023-02-14 | Stop reason: SDUPTHER

## 2022-12-14 ENCOUNTER — PATIENT MESSAGE (OUTPATIENT)
Dept: INTERNAL MEDICINE | Facility: CLINIC | Age: 42
End: 2022-12-14
Payer: COMMERCIAL

## 2022-12-15 ENCOUNTER — TELEPHONE (OUTPATIENT)
Dept: INTERNAL MEDICINE | Facility: CLINIC | Age: 42
End: 2022-12-15

## 2022-12-19 ENCOUNTER — PATIENT MESSAGE (OUTPATIENT)
Dept: OTOLARYNGOLOGY | Facility: CLINIC | Age: 42
End: 2022-12-19
Payer: COMMERCIAL

## 2022-12-20 RX ORDER — OFLOXACIN 3 MG/ML
5 SOLUTION AURICULAR (OTIC) 2 TIMES DAILY
Qty: 10 ML | Refills: 0 | Status: SHIPPED | OUTPATIENT
Start: 2022-12-20 | End: 2023-08-29

## 2022-12-22 ENCOUNTER — PATIENT MESSAGE (OUTPATIENT)
Dept: INTERNAL MEDICINE | Facility: CLINIC | Age: 42
End: 2022-12-22
Payer: COMMERCIAL

## 2023-02-15 ENCOUNTER — PATIENT MESSAGE (OUTPATIENT)
Dept: INTERNAL MEDICINE | Facility: CLINIC | Age: 43
End: 2023-02-15
Payer: COMMERCIAL

## 2023-02-16 ENCOUNTER — PATIENT MESSAGE (OUTPATIENT)
Dept: INTERNAL MEDICINE | Facility: CLINIC | Age: 43
End: 2023-02-16
Payer: COMMERCIAL

## 2023-02-20 ENCOUNTER — PATIENT MESSAGE (OUTPATIENT)
Dept: AUDIOLOGY | Facility: CLINIC | Age: 43
End: 2023-02-20
Payer: COMMERCIAL

## 2023-02-21 ENCOUNTER — OFFICE VISIT (OUTPATIENT)
Dept: PODIATRY | Facility: CLINIC | Age: 43
End: 2023-02-21
Payer: COMMERCIAL

## 2023-02-21 VITALS — WEIGHT: 171 LBS | HEIGHT: 63 IN | BODY MASS INDEX: 30.3 KG/M2

## 2023-02-21 DIAGNOSIS — M79.672 FOOT PAIN, BILATERAL: ICD-10-CM

## 2023-02-21 DIAGNOSIS — M89.8X7 EXOSTOSIS OF FOOT: ICD-10-CM

## 2023-02-21 DIAGNOSIS — M79.2 NEURITIS: Primary | ICD-10-CM

## 2023-02-21 DIAGNOSIS — M79.674 TOE PAIN, RIGHT: ICD-10-CM

## 2023-02-21 DIAGNOSIS — M79.671 FOOT PAIN, BILATERAL: ICD-10-CM

## 2023-02-21 PROCEDURE — 1159F PR MEDICATION LIST DOCUMENTED IN MEDICAL RECORD: ICD-10-PCS | Mod: CPTII,S$GLB,, | Performed by: PODIATRIST

## 2023-02-21 PROCEDURE — 99204 OFFICE O/P NEW MOD 45 MIN: CPT | Mod: S$GLB,,, | Performed by: PODIATRIST

## 2023-02-21 PROCEDURE — 3008F PR BODY MASS INDEX (BMI) DOCUMENTED: ICD-10-PCS | Mod: CPTII,S$GLB,, | Performed by: PODIATRIST

## 2023-02-21 PROCEDURE — 99204 PR OFFICE/OUTPT VISIT, NEW, LEVL IV, 45-59 MIN: ICD-10-PCS | Mod: S$GLB,,, | Performed by: PODIATRIST

## 2023-02-21 PROCEDURE — 99999 PR PBB SHADOW E&M-EST. PATIENT-LVL III: CPT | Mod: PBBFAC,,, | Performed by: PODIATRIST

## 2023-02-21 PROCEDURE — 99999 PR PBB SHADOW E&M-EST. PATIENT-LVL III: ICD-10-PCS | Mod: PBBFAC,,, | Performed by: PODIATRIST

## 2023-02-21 PROCEDURE — 99051 PR MEDICAL SERVICES, EVE/WKEND/HOLIDAY: ICD-10-PCS | Mod: S$GLB,,, | Performed by: PODIATRIST

## 2023-02-21 PROCEDURE — 99051 MED SERV EVE/WKEND/HOLIDAY: CPT | Mod: S$GLB,,, | Performed by: PODIATRIST

## 2023-02-21 PROCEDURE — 3008F BODY MASS INDEX DOCD: CPT | Mod: CPTII,S$GLB,, | Performed by: PODIATRIST

## 2023-02-21 PROCEDURE — 1159F MED LIST DOCD IN RCRD: CPT | Mod: CPTII,S$GLB,, | Performed by: PODIATRIST

## 2023-02-21 RX ORDER — GABAPENTIN 300 MG/1
300 CAPSULE ORAL NIGHTLY
Qty: 30 CAPSULE | Refills: 1 | Status: SHIPPED | OUTPATIENT
Start: 2023-02-21 | End: 2023-11-13

## 2023-02-21 RX ORDER — DICLOFENAC SODIUM 10 MG/G
2 GEL TOPICAL 2 TIMES DAILY
Qty: 60 G | Refills: 1 | Status: SHIPPED | OUTPATIENT
Start: 2023-02-21 | End: 2023-08-29

## 2023-02-21 NOTE — PROGRESS NOTES
PODIATRIC MEDICINE AND SURGERY  2/21/2023    PCP: Dr. Des Chaney MD    CHIEF COMPLAINT   Chief Complaint   Patient presents with    Foot Problem     C/o left foot pain, dorsal aspect, also c/o right 4th and 5th toe pain, currently 0 pain, x 6 months, non diabetic wears casual shoes, Last seen PCP Dr. Chaney 11/22/2022       HPI:    Duane Romero is a 42 y.o. female who has a past medical history of Allergy, Asthma, Hyperlipidemia, Otitis media, Prediabetes, and Sinusitis.   Duane presents to clinic today complaining of left foot pain. Pt states symptoms have been present for 6 months. She states pain is present on top of left foot. Pain is worse at night while at rest. She also complains about 4/5th toe pain when she is in narrow shoes. Symptoms are throbbing and achy in nature.      Patient denies other pedal complaints at this time.     PMH  Past Medical History:   Diagnosis Date    Allergy     Asthma     Hyperlipidemia     Otitis media     Prediabetes     Sinusitis        PROBLEM LIST  Patient Active Problem List    Diagnosis Date Noted    Conductive hearing loss of both ears     Prediabetes     Hyperlipidemia     Palpitations 11/26/2019    WILLIS (dyspnea on exertion) 11/26/2019    Insulin resistance 11/26/2019    Metabolic syndrome 11/26/2019    Obesity (BMI 30.0-34.9) 11/20/2019       MEDS  Current Outpatient Medications on File Prior to Visit   Medication Sig Dispense Refill    multivitamin,tx-minerals Tab Take by mouth once daily.      ofloxacin (FLOXIN) 0.3 % otic solution Place 5 drops into the right ear 2 (two) times daily. 10 mL 0    tirzepatide (MOUNJARO) 2.5 mg/0.5 mL PnIj Inject 2.5 mg into the skin every 7 days. 4 pen 0    triamcinolone acetonide 0.1% (KENALOG) 0.1 % ointment Apply topically 2 (two) times daily. 30 g 1    EPINEPHrine (EPIPEN) 0.3 mg/0.3 mL AtIn Inject 0.3 mLs (0.3 mg total) into the muscle once. for 1 dose 2 each 11     No current facility-administered medications  on file prior to visit.       Medication List with Changes/Refills   New Medications    DICLOFENAC SODIUM (VOLTAREN) 1 % GEL    Apply 2 g topically 2 (two) times daily. As needed for pain on foot.    GABAPENTIN (NEURONTIN) 300 MG CAPSULE    Take 1 capsule (300 mg total) by mouth every evening.   Current Medications    EPINEPHRINE (EPIPEN) 0.3 MG/0.3 ML ATIN    Inject 0.3 mLs (0.3 mg total) into the muscle once. for 1 dose    MULTIVITAMIN,TX-MINERALS TAB    Take by mouth once daily.    OFLOXACIN (FLOXIN) 0.3 % OTIC SOLUTION    Place 5 drops into the right ear 2 (two) times daily.    TIRZEPATIDE (MOUNJARO) 2.5 MG/0.5 ML PNIJ    Inject 2.5 mg into the skin every 7 days.    TRIAMCINOLONE ACETONIDE 0.1% (KENALOG) 0.1 % OINTMENT    Apply topically 2 (two) times daily.       PSH     Past Surgical History:   Procedure Laterality Date    ADENOIDECTOMY       SECTION      CYST REMOVAL Right     Under right arm    DILATION AND CURETTAGE OF UTERUS      HERNIA REPAIR  2022    HYSTERECTOMY      IMPLANTATION OF BONE ANCHORED HEARING AID (BAHA) Bilateral 2022    Procedure: INSERTION, BAHA;  Surgeon: Jeff Kelley MD;  Location: [facility] OR;  Service: ENT;  Laterality: Bilateral;    TONSILLECTOMY      TYMPANOPLASTY      TYMPANOSTOMY TUBE PLACEMENT      UMBILICAL HERNIA REPAIR N/A 2022    Procedure: REPAIR, HERNIA, UMBILICAL, AGE 5 YEARS OR OLDER;  Surgeon: Philipp Paiz MD;  Location: [facility] OR;  Service: General;  Laterality: N/A;        ALL  Review of patient's allergies indicates:   Allergen Reactions    Paraffin     Pcn [penicillins] Itching     Injection only.  Can tolerate oral form    Prednisone      Other reaction(s): increased heart rate       SOC     Social History     Tobacco Use    Smoking status: Never    Smokeless tobacco: Never   Substance Use Topics    Alcohol use: Yes     Alcohol/week: 1.0 standard drink     Types: 1 Shots of liquor per week     Comment: Occas. last drink on 2022    Drug use:  "Never         FAMILY HX    Family History   Problem Relation Age of Onset    Cancer Maternal Aunt         Skin cancer    Thyroid disease Maternal Grandmother     Hyperlipidemia Mother     Diabetes Father         DM2 insulin pump & oral    Hypertension Father     Obesity Father     Migraines Neg Hx     Asthma Neg Hx             REVIEW OF SYSTEMS  General: This patient is well-developed, well-nourished and appears stated age, well-oriented to person, place and time, and cooperative and pleasant on today's visit  Constitutional: Negative for chills and fever.   Respiratory: Negative for shortness of breath.    Cardiovascular: Negative for chest pain, palpitations, orthopnea  Gastrointestinal: Negative for diarrhea, nausea and vomiting.   Musculoskeletal: Positive for above noted in HPI  Skin: Negative  for skin and/or nail changes   Neurological: Negative for tingling and sensory changes  Peripheral Vascular: no claudication or cyanosis  Psychiatric/Behavioral: Negative for altered mental status     PHYSICAL EXAM:      Vitals:    02/21/23 1148   Weight: 77.6 kg (171 lb)   Height: 5' 3" (1.6 m)   PainSc: 0-No pain         LOWER EXTREMITY PHYSICAL EXAM  VASCULAR  Dorsalis pedis and posterior tibial pulses palpable 2/4 bilaterally. Capillary refill time immediate to the toes. Feet are warm to the touch. Skin temperature warm to warm from proximally to distally There are no varicosities, telangiectasias noted to bilateral foot and ankle regions. There are no ecchymoses noted to bilateral foot and ankle regions. There is no gross lower extremity edema.    DERMATOLOGIC  Skin moist with healthy texture and turgor.There are no open ulcerations, lacerations, or fissures to bilateral foot and ankle regions. There are no signs of infection as there is no erythema, no proximal-extending lymphangiitis, no fluctuance, or crepitus noted on palpation to bilateral foot and ankle regions. There is no interdigital maceration.   There are " no hyperkeratotic lesions noted to feet. Nails are well-trimmed.    NEUROLOGIC  Epicritic sensation is intact as the patient is able to sense light touch to bilateral foot and ankle regions. Achilles and patellar deep tendon reflexes intact. Babinski reflex absent    ORTHOPEDIC/BIOMECHANICAL  No symptomatic structural abnormalities noted. Muscle strength AT/EHL/EDL/PT: 5/5; Achilles/Gastroc/Soleus: 5/5; PB/PL: 5/5 Muscle tone is normal. Ankle joint ROM non painful with DF/PF, non-crepitus; STJ ROM  Inv/ev non painful, non crepitus ; MTPJ b/l  DF/PF, non crepitus ; Foot type: pes cavus         ASSESSMENT     Encounter Diagnoses   Name Primary?    Foot pain, bilateral     Toe pain, right     Exostosis of foot     Neuritis Yes         PLAN  Patient was educated about clinical and imaging findings, and verbalizes understanding of above.     Diagnoses and all orders for this visit:  Neuritis    Foot pain, bilateral    Toe pain, right    Exostosis of foot    Other orders  -     gabapentin (NEURONTIN) 300 MG capsule; Take 1 capsule (300 mg total) by mouth every evening.  Dispense: 30 capsule; Refill: 1  -     diclofenac sodium (VOLTAREN) 1 % Gel; Apply 2 g topically 2 (two) times daily. As needed for pain on foot.  Dispense: 60 g; Refill: 1      Rx Gabapentin trial for neuritic symptoms overlying midfoot prominence  Extra depth shoes  Topical voltaren      Disclaimer:  This note may have been prepared using voice recognition software, it may have not been extensively proofed, as such there could be errors within the text such as sound alike errors.         Future Appointments   Date Time Provider Department Center   4/17/2023 10:40 AM Garrison Clemons MD [facility] [place] [facility]       Report Electronically Signed By:     Nilsa Peterson DPM   Podiatry  [facility] [place]  2/21/2023

## 2023-02-22 ENCOUNTER — TELEPHONE (OUTPATIENT)
Dept: INTERNAL MEDICINE | Facility: CLINIC | Age: 43
End: 2023-02-22
Payer: COMMERCIAL

## 2023-03-06 ENCOUNTER — PATIENT MESSAGE (OUTPATIENT)
Dept: INTERNAL MEDICINE | Facility: CLINIC | Age: 43
End: 2023-03-06
Payer: COMMERCIAL

## 2023-03-10 ENCOUNTER — PATIENT MESSAGE (OUTPATIENT)
Dept: INTERNAL MEDICINE | Facility: CLINIC | Age: 43
End: 2023-03-10
Payer: COMMERCIAL

## 2023-04-05 ENCOUNTER — PATIENT MESSAGE (OUTPATIENT)
Dept: AUDIOLOGY | Facility: CLINIC | Age: 43
End: 2023-04-05
Payer: COMMERCIAL

## 2023-04-12 ENCOUNTER — CLINICAL SUPPORT (OUTPATIENT)
Dept: AUDIOLOGY | Facility: CLINIC | Age: 43
End: 2023-04-12
Payer: COMMERCIAL

## 2023-04-12 DIAGNOSIS — Z96.21 STATUS POST PLACEMENT OF BONE ANCHORED HEARING AID (BAHA): Primary | ICD-10-CM

## 2023-04-12 NOTE — PROGRESS NOTES
Duane Romero was seen 04/12/2023 for a Bone Anchored Hearing Aid follow-up. She is interested in increasing magnet strength. Her right Osia will increase from a 1 to a 2. Her left Osia will increase from a 2 to a 3. She tolerated the increase well. She tried a 3 on her right side but felt a headache coming on, so we kept it a 2 for now.     She will return 6/1 after school gets out and we will retest her hearing, consider increasing to a 3 right and represcribe her settings in her current or her back up processors. My goal is to get her back ups programmed so that she can have them at home as true backups.

## 2023-05-03 DIAGNOSIS — Z12.31 OTHER SCREENING MAMMOGRAM: ICD-10-CM

## 2023-06-01 ENCOUNTER — CLINICAL SUPPORT (OUTPATIENT)
Dept: AUDIOLOGY | Facility: CLINIC | Age: 43
End: 2023-06-01
Payer: COMMERCIAL

## 2023-06-01 DIAGNOSIS — Z96.21 STATUS POST PLACEMENT OF BONE ANCHORED HEARING AID (BAHA): Primary | ICD-10-CM

## 2023-06-01 DIAGNOSIS — H90.2 HEARING LOSS, CONDUCTIVE, TYMPANIC MEMBRANE: Primary | ICD-10-CM

## 2023-06-01 PROCEDURE — 92557 COMPREHENSIVE HEARING TEST: CPT | Mod: S$GLB,,, | Performed by: AUDIOLOGIST

## 2023-06-01 PROCEDURE — 99999 PR PBB SHADOW E&M-EST. PATIENT-LVL I: CPT | Mod: PBBFAC,,, | Performed by: AUDIOLOGIST

## 2023-06-01 PROCEDURE — 99999 PR PBB SHADOW E&M-EST. PATIENT-LVL I: ICD-10-PCS | Mod: PBBFAC,,, | Performed by: AUDIOLOGIST

## 2023-06-01 PROCEDURE — 92557 PR COMPREHENSIVE HEARING TEST: ICD-10-PCS | Mod: S$GLB,,, | Performed by: AUDIOLOGIST

## 2023-06-01 NOTE — PROGRESS NOTES
Duane Romero was seen 06/01/2023 for an audiological evaluation. Patient has history and compliant of bilateral TM perforations and failed tympanoplasty with bilateral conductive hearing loss. She had BCI last summer and wears bilateral Osia processors.    Results reveal a moderately severe rising to normal conductive hearing loss 250-8000 Hz for the right ear, and  moderately severe rising to mild conductive hearing loss 250-8000 Hz for the left ear.   Speech Reception Thresholds were  25 dBHL for the right ear and 35 dBHL for the left ear.   Word recognition scores were excellent for the right ear and excellent for the left ear.  Tympanograms were     Patient was counseled on the above findings.    Recommendations:  Follow-up with ENT, as needed  Repeat audiological evaluation, as needed.  Consider a hearing aid consultation. Patient provided with clinic hearing aid information packet and a copy of audiogram.   Wear hearing protection devices when around loud noise.

## 2023-06-01 NOTE — PROGRESS NOTES
Duane Romero was seen 06/01/2023 for a Bone Anchored Hearing Aid fitting with her Osia back up processors.  NEW FILE CREATED UNDER: ABHAY ROMERO In [facility].  Original processors are in Duane Romero file. Patient was fitted to sensogram and additional programs were added; P2 Noise, P3 outdoor and P4 music. Static was present- In digital link calibration, I needed to turn Audio Signal Offset-ON for both processors. Right stayed at Standard and Left moved to Standard +2. I did not need to turn on Low Pass Filter.    Make:     Cochlear   Model:    Osia Right and Left  Magnet Strength:   3  Color:     all  Battery:    675  Right SN:    [id number]  Left SN:     [id number]    I will inquire about an additional #3 magnet.

## 2023-07-26 ENCOUNTER — CLINICAL SUPPORT (OUTPATIENT)
Dept: AUDIOLOGY | Facility: CLINIC | Age: 43
End: 2023-07-26
Payer: COMMERCIAL

## 2023-07-26 DIAGNOSIS — Z96.21 STATUS POST PLACEMENT OF BONE ANCHORED HEARING AID (BAHA): Primary | ICD-10-CM

## 2023-07-26 NOTE — PROGRESS NOTES
Duane Pérezsasha Romero was seen 07/26/2023 for a Bone Anchored Hearing Aid follow up. She has been wearing #3 magnets bilaterally without issue. She needs volume decreased on back up processors and would like to try #4 magnets. Unfortunately, she was 30 minutes late today because she had the appointment time as 10:40 in her head. I put #4s in for her to try and after several minutes she felt a headache coming on the right side. She will experiment with the strengths until I see her next week. At that visit she will decide on magnets and I will turn down the volume on the back ups.    #4 magnets in original processors (Duane file in [facility])    #3 magnets in back up processors (Maryse file in [facility])

## 2023-08-03 ENCOUNTER — CLINICAL SUPPORT (OUTPATIENT)
Dept: AUDIOLOGY | Facility: CLINIC | Age: 43
End: 2023-08-03
Payer: COMMERCIAL

## 2023-08-03 DIAGNOSIS — Z96.21 STATUS POST PLACEMENT OF BONE ANCHORED HEARING AID (BAHA): ICD-10-CM

## 2023-08-03 DIAGNOSIS — H90.2 HEARING LOSS, CONDUCTIVE, MIDDLE EAR: Primary | ICD-10-CM

## 2023-08-03 NOTE — PROGRESS NOTES
Duane Romero was seen 08/03/2023 for an Osia follow-up. She could not tolerate the #4 magnets. Her original processor's #4s were replaced with #3s. She has been wearing her back up processors and they are connected to her cell phone. These were turned down per her request. Loudness reduced in everyday program only on both processors. Other programs were not adjusted. Her aids also had to be re paired to her phone.    All changes done in ABHAY ROMERO file:  Wearing back up processors today with #3 magnets.   SN# right :[id number]  SN# left: [id number]    She will keep me updated on how she is doing and if she needs any further adjustments.

## 2023-08-23 ENCOUNTER — OFFICE VISIT (OUTPATIENT)
Dept: URGENT CARE | Facility: CLINIC | Age: 43
End: 2023-08-23
Payer: COMMERCIAL

## 2023-08-23 VITALS
RESPIRATION RATE: 16 BRPM | SYSTOLIC BLOOD PRESSURE: 126 MMHG | WEIGHT: 163 LBS | BODY MASS INDEX: 27.83 KG/M2 | DIASTOLIC BLOOD PRESSURE: 72 MMHG | HEIGHT: 64 IN | OXYGEN SATURATION: 98 % | TEMPERATURE: 98 F | HEART RATE: 80 BPM

## 2023-08-23 DIAGNOSIS — J02.9 SORE THROAT: ICD-10-CM

## 2023-08-23 DIAGNOSIS — H92.11 OTORRHEA OF RIGHT EAR: ICD-10-CM

## 2023-08-23 DIAGNOSIS — H60.391 INFECTIVE OTITIS EXTERNA OF RIGHT EAR: Primary | ICD-10-CM

## 2023-08-23 LAB
CTP QC/QA: YES
MOLECULAR STREP A: NEGATIVE

## 2023-08-23 PROCEDURE — 99213 PR OFFICE/OUTPT VISIT, EST, LEVL III, 20-29 MIN: ICD-10-PCS | Mod: S$GLB,,, | Performed by: NURSE PRACTITIONER

## 2023-08-23 PROCEDURE — 87651 POCT STREP A MOLECULAR: ICD-10-PCS | Mod: QW,S$GLB,, | Performed by: NURSE PRACTITIONER

## 2023-08-23 PROCEDURE — 99213 OFFICE O/P EST LOW 20 MIN: CPT | Mod: S$GLB,,, | Performed by: NURSE PRACTITIONER

## 2023-08-23 PROCEDURE — 87651 STREP A DNA AMP PROBE: CPT | Mod: QW,S$GLB,, | Performed by: NURSE PRACTITIONER

## 2023-08-23 RX ORDER — CEFDINIR 300 MG/1
300 CAPSULE ORAL 2 TIMES DAILY
Qty: 20 CAPSULE | Refills: 0 | Status: SHIPPED | OUTPATIENT
Start: 2023-08-23 | End: 2023-09-02

## 2023-08-23 RX ORDER — CEFDINIR 300 MG/1
300 CAPSULE ORAL 2 TIMES DAILY
Qty: 20 CAPSULE | Refills: 0 | Status: SHIPPED | OUTPATIENT
Start: 2023-08-23 | End: 2023-08-23

## 2023-08-23 NOTE — PATIENT INSTRUCTIONS
Please take all medications as prescribed.   Please complete your entire course of antibiotics as prescribed to ensure effectiveness.   Please start an OTC probiotic while taking antibiotics to replenish GI laura affected by antibiotic use.   SORE THROAT:    You may gargle with hot salt water 4 times a day for the next 2 days and then you may also gargle diluted hydrogen peroxide once to twice daily to alleviate some of your throat discomfort.  Drink plenty of fluids, recommend warm tea with honey.     YOU MAY USE OVER-THE-COUNTER CEPACOL FOR SOOTHING OF YOUR THROAT.  You may wish to avoid spicy food, citrus fruits, and red sauces- as this may irritate the throat more.    You can also take a daily anti-histamine such as Zyrtec, Claritin, Xyzal, OR Allegra-IN DAYTIME; NON DROWSY) AND/OR Benadryl- AT NIGHT; DROWSY) to help with runny nose/sneezing/sore throat/cough.    If your symptoms do not improve, you should return to this clinic. If your symptoms worsen, go to the emergency room.

## 2023-08-23 NOTE — LETTER
August 23, 2023      [facility]  [address], [address]  [facility] [address]  Phone: [phone]  Fax: [phone]       Patient: Duane Romero   YOB: 1980  Date of Visit: 08/23/2023    To Whom It May Concern:    Napoleon Romero  was at [facility] on 08/23/2023. The patient may return to work/school on 08/24/23 with no restrictions. If you have any questions or concerns, or if I can be of further assistance, please do not hesitate to contact me.    Sincerely,        Ab Murray NP

## 2023-08-23 NOTE — PROGRESS NOTES
"Subjective:      Patient ID: Duane Romero is a 42 y.o. female.    Vitals:  height is 5' 4" (1.626 m) and weight is 73.9 kg (163 lb). Her temperature is 98.1 °F (36.7 °C). Her blood pressure is 126/72 and her pulse is 80. Her respiration is 16 and oxygen saturation is 98%.     Chief Complaint: Sore Throat    Duane Manzano Is a 42 year old female whom  presents with complaint of sore throat and bilateral ear pain.  Patient reports she  does have "holes in her eardrums."  Symptoms started yesterday. 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     Sore Throat   This is a new problem. The current episode started yesterday. The problem has been unchanged. Sore throat worse side: both. There has been no fever. The pain is at a severity of 2/10. The pain is mild. Associated symptoms include ear discharge and ear pain. Pertinent negatives include no abdominal pain, congestion, coughing, diarrhea, drooling, headaches, hoarse voice, plugged ear sensation, neck pain, shortness of breath, stridor, swollen glands, trouble swallowing or vomiting. She has had no exposure to strep or mono. She has tried nothing for the symptoms. The treatment provided no relief.       HENT:  Positive for ear pain, ear discharge and sore throat. Negative for drooling, congestion and trouble swallowing.    Neck: Negative for neck pain.   Respiratory:  Negative for cough, shortness of breath and stridor.    Gastrointestinal:  Negative for abdominal pain, vomiting and diarrhea.   Neurological:  Negative for headaches.      Objective:     Physical Exam   Constitutional: She is oriented to person, place, and time. She appears well-developed. She is cooperative.   HENT:   Head: Normocephalic and atraumatic.   Ears:   Right Ear: Hearing, external ear and ear canal normal. There is drainage. Tympanic membrane is perforated.   Left Ear: Tympanic membrane is scarred and perforated.   Nose: Nose normal. No mucosal " edema or nasal deformity. No epistaxis. Right sinus exhibits no maxillary sinus tenderness and no frontal sinus tenderness. Left sinus exhibits no maxillary sinus tenderness and no frontal sinus tenderness.   Mouth/Throat: Uvula is midline, oropharynx is clear and moist and mucous membranes are normal. No trismus in the jaw. Normal dentition. No uvula swelling.   Eyes: Conjunctivae and lids are normal.   Neck: Trachea normal and phonation normal. Neck supple.   Cardiovascular: Normal rate, regular rhythm, normal heart sounds and normal pulses.   Pulmonary/Chest: Effort normal and breath sounds normal.   Abdominal: Normal appearance and bowel sounds are normal. Soft.   Musculoskeletal: Normal range of motion.         General: Normal range of motion.   Neurological: She is alert and oriented to person, place, and time. She exhibits normal muscle tone.   Skin: Skin is warm, dry and intact.   Psychiatric: Her speech is normal and behavior is normal. Judgment and thought content normal.   Nursing note and vitals reviewed.    Results for orders placed or performed in visit on 08/23/23   POCT Strep A, Molecular   Result Value Ref Range    Molecular Strep A, POC Negative Negative     Acceptable Yes        Assessment:     1. Infective otitis externa of right ear    2. Sore throat    3. Otorrhea of right ear        Plan:       Infective otitis externa of right ear  -     cefdinir (OMNICEF) 300 MG capsule; Take 1 capsule (300 mg total) by mouth 2 (two) times daily. for 10 days  Dispense: 20 capsule; Refill: 0    Sore throat  -     POCT Strep A, Molecular    Otorrhea of right ear          Medical Decision Making:   History:   Old Records Summarized: records from clinic visits.       <> Summary of Records: She has hx of CSOM with 10 sets of ear tubes as a child.  She says that she eventually had a retained ear tube as well as bilateral perforations.   Clinical Tests:   Lab Tests: Ordered and Reviewed       <>  Summary of Lab: Strep negative  Urgent Care Management:  Previous encounters were independently reviewed. Discussed with patient  all pertinent information and results. Discussed patient diagnosis and plan of treatment. Patient would like oral antibiotics, patient reports difficulty instilling ear drops and reports she currently does not have anyone at home that can assist her. Additional plan of care as outlined above. Patient was given all follow up and return instructions. All questions and concerns were addressed at this time. Patient  expresses understanding of information and instructions, and is comfortable with plan.    Patient was instructed to follow up with his Primary Care Provider if no improvement in symptoms in 2-3 DAYS:  or go to ED immediately for any worsening or change in current symptoms. Patient remained stable throughout the visit and exited the exam room in NAD.            Patient Instructions   Please take all medications as prescribed.   Please complete your entire course of antibiotics as prescribed to ensure effectiveness.   Please start an OTC probiotic while taking antibiotics to replenish GI laura affected by antibiotic use.   SORE THROAT:    You may gargle with hot salt water 4 times a day for the next 2 days and then you may also gargle diluted hydrogen peroxide once to twice daily to alleviate some of your throat discomfort.  Drink plenty of fluids, recommend warm tea with honey.     YOU MAY USE OVER-THE-COUNTER CEPACOL FOR SOOTHING OF YOUR THROAT.  You may wish to avoid spicy food, citrus fruits, and red sauces- as this may irritate the throat more.    You can also take a daily anti-histamine such as Zyrtec, Claritin, Xyzal, OR Allegra-IN DAYTIME; NON DROWSY) AND/OR Benadryl- AT NIGHT; DROWSY) to help with runny nose/sneezing/sore throat/cough.    If your symptoms do not improve, you should return to this clinic. If your symptoms worsen, go to the emergency room.

## 2023-08-26 ENCOUNTER — TELEPHONE (OUTPATIENT)
Dept: URGENT CARE | Facility: CLINIC | Age: 43
End: 2023-08-26
Payer: COMMERCIAL

## 2023-08-27 ENCOUNTER — TELEPHONE (OUTPATIENT)
Dept: URGENT CARE | Facility: CLINIC | Age: 43
End: 2023-08-27
Payer: COMMERCIAL

## 2023-08-27 DIAGNOSIS — H60.93 OTITIS EXTERNA OF BOTH EARS, UNSPECIFIED CHRONICITY, UNSPECIFIED TYPE: Primary | ICD-10-CM

## 2023-08-27 RX ORDER — AZITHROMYCIN 250 MG/1
TABLET, FILM COATED ORAL
Qty: 6 TABLET | Refills: 0 | Status: SHIPPED | OUTPATIENT
Start: 2023-08-27 | End: 2023-09-01

## 2023-08-27 NOTE — TELEPHONE ENCOUNTER
PT presented to clinic reports Omnicef is cuasing stomach upset. Discussed to stop Omnicef and start Zithromax for bilateral ear infection. PT reports taking Zithromax in past and has tolerated well. PT verbalized understanding.

## 2023-09-07 ENCOUNTER — PATIENT MESSAGE (OUTPATIENT)
Dept: AUDIOLOGY | Facility: CLINIC | Age: 43
End: 2023-09-07
Payer: COMMERCIAL

## 2023-09-08 NOTE — TELEPHONE ENCOUNTER
Called patient to ensure device is functioning properly. No problems with hardware. Patient needs program adjustment. Appt set with Grisel Houston MA, CCC-A for 10/11 at 11 AM.

## 2023-10-10 ENCOUNTER — CLINICAL SUPPORT (OUTPATIENT)
Dept: AUDIOLOGY | Facility: CLINIC | Age: 43
End: 2023-10-10
Payer: COMMERCIAL

## 2023-10-10 DIAGNOSIS — H90.2 HEARING LOSS, CONDUCTIVE, TYMPANIC MEMBRANE: Primary | ICD-10-CM

## 2023-10-11 ENCOUNTER — LAB VISIT (OUTPATIENT)
Dept: LAB | Facility: HOSPITAL | Age: 43
End: 2023-10-11
Attending: FAMILY MEDICINE
Payer: COMMERCIAL

## 2023-10-11 ENCOUNTER — OFFICE VISIT (OUTPATIENT)
Dept: INTERNAL MEDICINE | Facility: CLINIC | Age: 43
End: 2023-10-11
Payer: COMMERCIAL

## 2023-10-11 VITALS
OXYGEN SATURATION: 98 % | BODY MASS INDEX: 26.46 KG/M2 | TEMPERATURE: 98 F | HEART RATE: 72 BPM | SYSTOLIC BLOOD PRESSURE: 124 MMHG | WEIGHT: 155 LBS | HEIGHT: 64 IN | DIASTOLIC BLOOD PRESSURE: 67 MMHG

## 2023-10-11 DIAGNOSIS — Z00.00 ROUTINE ADULT HEALTH MAINTENANCE: ICD-10-CM

## 2023-10-11 DIAGNOSIS — Z00.00 ROUTINE ADULT HEALTH MAINTENANCE: Primary | ICD-10-CM

## 2023-10-11 DIAGNOSIS — Z12.11 COLON CANCER SCREENING: ICD-10-CM

## 2023-10-11 PROBLEM — O03.9 SPONTANEOUS MISCARRIAGE: Status: ACTIVE | Noted: 2023-10-11

## 2023-10-11 LAB
ALBUMIN SERPL BCP-MCNC: 3.7 G/DL (ref 3.5–5.2)
ALP SERPL-CCNC: 68 U/L (ref 55–135)
ALT SERPL W/O P-5'-P-CCNC: 16 U/L (ref 10–44)
ANION GAP SERPL CALC-SCNC: 6 MMOL/L (ref 8–16)
AST SERPL-CCNC: 13 U/L (ref 10–40)
BASOPHILS # BLD AUTO: 0.02 K/UL (ref 0–0.2)
BASOPHILS NFR BLD: 0.4 % (ref 0–1.9)
BILIRUB SERPL-MCNC: 0.4 MG/DL (ref 0.1–1)
BUN SERPL-MCNC: 14 MG/DL (ref 6–20)
CALCIUM SERPL-MCNC: 9 MG/DL (ref 8.7–10.5)
CHLORIDE SERPL-SCNC: 106 MMOL/L (ref 95–110)
CHOLEST SERPL-MCNC: 205 MG/DL (ref 120–199)
CHOLEST/HDLC SERPL: 3.9 {RATIO} (ref 2–5)
CO2 SERPL-SCNC: 25 MMOL/L (ref 23–29)
CREAT SERPL-MCNC: 0.8 MG/DL (ref 0.5–1.4)
DIFFERENTIAL METHOD: ABNORMAL
EOSINOPHIL # BLD AUTO: 0 K/UL (ref 0–0.5)
EOSINOPHIL NFR BLD: 0.5 % (ref 0–8)
ERYTHROCYTE [DISTWIDTH] IN BLOOD BY AUTOMATED COUNT: 13.8 % (ref 11.5–14.5)
EST. GFR  (NO RACE VARIABLE): >60 ML/MIN/1.73 M^2
ESTIMATED AVG GLUCOSE: 114 MG/DL (ref 68–131)
GLUCOSE SERPL-MCNC: 211 MG/DL (ref 70–110)
HBA1C MFR BLD: 5.6 % (ref 4–5.6)
HCT VFR BLD AUTO: 42.6 % (ref 37–48.5)
HDLC SERPL-MCNC: 53 MG/DL (ref 40–75)
HDLC SERPL: 25.9 % (ref 20–50)
HGB BLD-MCNC: 14.4 G/DL (ref 12–16)
IMM GRANULOCYTES # BLD AUTO: 0.01 K/UL (ref 0–0.04)
IMM GRANULOCYTES NFR BLD AUTO: 0.2 % (ref 0–0.5)
LDLC SERPL CALC-MCNC: 135.6 MG/DL (ref 63–159)
LYMPHOCYTES # BLD AUTO: 1.1 K/UL (ref 1–4.8)
LYMPHOCYTES NFR BLD: 20.4 % (ref 18–48)
MCH RBC QN AUTO: 31.2 PG (ref 27–31)
MCHC RBC AUTO-ENTMCNC: 33.8 G/DL (ref 32–36)
MCV RBC AUTO: 92 FL (ref 82–98)
MONOCYTES # BLD AUTO: 0.4 K/UL (ref 0.3–1)
MONOCYTES NFR BLD: 6.5 % (ref 4–15)
NEUTROPHILS # BLD AUTO: 4 K/UL (ref 1.8–7.7)
NEUTROPHILS NFR BLD: 72 % (ref 38–73)
NONHDLC SERPL-MCNC: 152 MG/DL
NRBC BLD-RTO: 0 /100 WBC
PLATELET # BLD AUTO: 234 K/UL (ref 150–450)
PMV BLD AUTO: 10.5 FL (ref 9.2–12.9)
POTASSIUM SERPL-SCNC: 3.8 MMOL/L (ref 3.5–5.1)
PROT SERPL-MCNC: 6.1 G/DL (ref 6–8.4)
RBC # BLD AUTO: 4.62 M/UL (ref 4–5.4)
SODIUM SERPL-SCNC: 137 MMOL/L (ref 136–145)
TRIGL SERPL-MCNC: 82 MG/DL (ref 30–150)
WBC # BLD AUTO: 5.58 K/UL (ref 3.9–12.7)

## 2023-10-11 PROCEDURE — 3008F PR BODY MASS INDEX (BMI) DOCUMENTED: ICD-10-PCS | Mod: CPTII,S$GLB,, | Performed by: FAMILY MEDICINE

## 2023-10-11 PROCEDURE — 3008F BODY MASS INDEX DOCD: CPT | Mod: CPTII,S$GLB,, | Performed by: FAMILY MEDICINE

## 2023-10-11 PROCEDURE — 3078F DIAST BP <80 MM HG: CPT | Mod: CPTII,S$GLB,, | Performed by: FAMILY MEDICINE

## 2023-10-11 PROCEDURE — 3078F PR MOST RECENT DIASTOLIC BLOOD PRESSURE < 80 MM HG: ICD-10-PCS | Mod: CPTII,S$GLB,, | Performed by: FAMILY MEDICINE

## 2023-10-11 PROCEDURE — 99999 PR PBB SHADOW E&M-EST. PATIENT-LVL IV: ICD-10-PCS | Mod: PBBFAC,,, | Performed by: FAMILY MEDICINE

## 2023-10-11 PROCEDURE — 1159F MED LIST DOCD IN RCRD: CPT | Mod: CPTII,S$GLB,, | Performed by: FAMILY MEDICINE

## 2023-10-11 PROCEDURE — 80053 COMPREHEN METABOLIC PANEL: CPT | Performed by: FAMILY MEDICINE

## 2023-10-11 PROCEDURE — 99396 PR PREVENTIVE VISIT,EST,40-64: ICD-10-PCS | Mod: S$GLB,,, | Performed by: FAMILY MEDICINE

## 2023-10-11 PROCEDURE — 36415 COLL VENOUS BLD VENIPUNCTURE: CPT | Mod: PO | Performed by: FAMILY MEDICINE

## 2023-10-11 PROCEDURE — 80061 LIPID PANEL: CPT | Performed by: FAMILY MEDICINE

## 2023-10-11 PROCEDURE — 83036 HEMOGLOBIN GLYCOSYLATED A1C: CPT | Performed by: FAMILY MEDICINE

## 2023-10-11 PROCEDURE — 3074F SYST BP LT 130 MM HG: CPT | Mod: CPTII,S$GLB,, | Performed by: FAMILY MEDICINE

## 2023-10-11 PROCEDURE — 85025 COMPLETE CBC W/AUTO DIFF WBC: CPT | Performed by: FAMILY MEDICINE

## 2023-10-11 PROCEDURE — 99396 PREV VISIT EST AGE 40-64: CPT | Mod: S$GLB,,, | Performed by: FAMILY MEDICINE

## 2023-10-11 PROCEDURE — 3074F PR MOST RECENT SYSTOLIC BLOOD PRESSURE < 130 MM HG: ICD-10-PCS | Mod: CPTII,S$GLB,, | Performed by: FAMILY MEDICINE

## 2023-10-11 PROCEDURE — 99999 PR PBB SHADOW E&M-EST. PATIENT-LVL IV: CPT | Mod: PBBFAC,,, | Performed by: FAMILY MEDICINE

## 2023-10-11 PROCEDURE — 1159F PR MEDICATION LIST DOCUMENTED IN MEDICAL RECORD: ICD-10-PCS | Mod: CPTII,S$GLB,, | Performed by: FAMILY MEDICINE

## 2023-10-11 RX ORDER — DEXTROAMPHETAMINE SACCHARATE, AMPHETAMINE ASPARTATE MONOHYDRATE, DEXTROAMPHETAMINE SULFATE AND AMPHETAMINE SULFATE 6.25; 6.25; 6.25; 6.25 MG/1; MG/1; MG/1; MG/1
25 CAPSULE, EXTENDED RELEASE ORAL EVERY MORNING
COMMUNITY

## 2023-10-11 NOTE — PROGRESS NOTES
Subjective:      Patient ID: Duane Romero is a 43 y.o. female.    Chief Complaint: Follow-up      Patient here for routine annual wellness exam. She has been doing well overall.   She has been eating healthy diet, exercising, has started to lose weight again after having plateaued for a time.  Did have well woman exam with her gynecolgist Dr. Clemons earlier this year.       Follow-up  Pertinent negatives include no arthralgias, chest pain, headaches, joint swelling, neck pain, vomiting or weakness.     Review of Systems   Constitutional:  Positive for activity change. Negative for unexpected weight change.   HENT:  Negative for hearing loss, rhinorrhea and trouble swallowing.    Eyes:  Negative for discharge and visual disturbance.   Respiratory:  Negative for chest tightness and wheezing.    Cardiovascular:  Negative for chest pain and palpitations.   Gastrointestinal:  Negative for blood in stool, constipation, diarrhea and vomiting.   Endocrine: Negative for polydipsia and polyuria.   Genitourinary:  Negative for difficulty urinating, dysuria, hematuria and menstrual problem.   Musculoskeletal:  Negative for arthralgias, joint swelling and neck pain.   Neurological:  Negative for weakness and headaches.   Psychiatric/Behavioral:  Positive for dysphoric mood. Negative for confusion.      Past Medical History:   Diagnosis Date    Allergy     Asthma     Hyperlipidemia     Otitis media     Prediabetes     Sinusitis           Past Surgical History:   Procedure Laterality Date    ADENOIDECTOMY       SECTION      CYST REMOVAL Right     Under right arm    DILATION AND CURETTAGE OF UTERUS      HERNIA REPAIR  2022    HYSTERECTOMY  2021    FRANCO/BS    IMPLANTATION OF BONE ANCHORED HEARING AID (BAHA) Bilateral 2022    Procedure: INSERTION, BAHA;  Surgeon: Jeff Kelley MD;  Location: [facility];  Service: ENT;  Laterality: Bilateral;    TONSILLECTOMY      TUBAL LIGATION      TYMPANOPLASTY       TYMPANOSTOMY TUBE PLACEMENT      UMBILICAL HERNIA REPAIR N/A 06/24/2022    Procedure: REPAIR, HERNIA, UMBILICAL, AGE 5 YEARS OR OLDER;  Surgeon: Philipp Paiz MD;  Location: [facility];  Service: General;  Laterality: N/A;     Family History   Problem Relation Age of Onset    Hyperlipidemia Mother     Diabetes Father         DM2 insulin pump & oral    Hypertension Father     Obesity Father     Breast cancer Maternal Aunt     Cancer Maternal Aunt         Skin cancer    Thyroid disease Maternal Grandmother     Migraines Neg Hx     Asthma Neg Hx      Social History     Socioeconomic History    Marital status:    Tobacco Use    Smoking status: Never    Smokeless tobacco: Never   Substance and Sexual Activity    Alcohol use: Not Currently     Comment: Occas. last drink on 06/22/2022    Drug use: Never    Sexual activity: Not Currently     Partners: Male     Birth control/protection: None     Comment: I had a partial hysterectomy 5-   Social History Narrative    Patient goal(s): Weight of 130 lbs    Motivation: Stay healthy (prevent dz/need for rx) to enjoy outdoor activities with family or by herself    Breakfast: 1-2 sausages & 1-2 eggs with 1 slice bread; water & coffee w/ Stevia & sugar-free creamer    Lunch: Boneless chicken wings & 1 thin slice of cheesecake or 1 turkey sandwich on white bread, 1 slice of American cheese; water    Dinner: Hamburger (90:10 or 80:20 meat) with cheese & bread or chicken; water or wine 5.5 oz    Snacks: Apples or 1-2 small bag of chips daily (160 jamaal)    Eating out: 3-4x/wk    Water (oz/day): 102 oz/d    Physical Activity: 35-50 min/d of resistance/aerobic & 4-5d/wk     Strategies: Lose it radha (smaller portions; lower carb)     Review of patient's allergies indicates:   Allergen Reactions    Paraffin     Pcn [penicillins] Itching     Injection only.  Can tolerate oral form    Prednisone      Other reaction(s): increased heart rate       Objective:       /67 (BP  "Location: Left arm, Patient Position: Sitting, BP Method: Large (Automatic))   Pulse 72   Temp 97.8 °F (36.6 °C) (Tympanic)   Ht 5' 4" (1.626 m)   Wt 70.3 kg (154 lb 15.7 oz)   LMP 05/30/2021   SpO2 98%   BMI 26.60 kg/m²   Physical Exam  Vitals reviewed.   Constitutional:       General: She is not in acute distress.     Appearance: Normal appearance. She is well-developed. She is not ill-appearing or diaphoretic.   HENT:      Head: Normocephalic and atraumatic.      Right Ear: Hearing, tympanic membrane, ear canal and external ear normal.      Left Ear: Hearing, tympanic membrane, ear canal and external ear normal.      Nose: Nose normal.      Mouth/Throat:      Pharynx: Uvula midline. No oropharyngeal exudate.   Eyes:      Conjunctiva/sclera: Conjunctivae normal.      Pupils: Pupils are equal, round, and reactive to light.   Neck:      Thyroid: No thyromegaly.      Trachea: No tracheal deviation.   Cardiovascular:      Rate and Rhythm: Normal rate and regular rhythm.      Heart sounds: Normal heart sounds. No murmur heard.  Pulmonary:      Effort: Pulmonary effort is normal. No respiratory distress.      Breath sounds: Normal breath sounds.   Abdominal:      General: Bowel sounds are normal.      Palpations: Abdomen is soft.      Tenderness: There is no abdominal tenderness. There is no guarding.      Hernia: No hernia is present.   Musculoskeletal:         General: Normal range of motion.      Cervical back: Normal range of motion and neck supple.      Right lower leg: No edema.      Left lower leg: No edema.   Lymphadenopathy:      Cervical: No cervical adenopathy.   Skin:     General: Skin is warm and dry.      Capillary Refill: Capillary refill takes less than 2 seconds.   Neurological:      General: No focal deficit present.      Mental Status: She is alert and oriented to person, place, and time.   Psychiatric:         Mood and Affect: Mood normal.         Behavior: Behavior normal.         Thought " Content: Thought content normal.         Judgment: Judgment normal.       Assessment:     1. Routine adult health maintenance    2. Colon cancer screening      Plan:   Routine adult health maintenance  -     CBC Auto Differential; Future; Expected date: 10/11/2023  -     Comprehensive Metabolic Panel; Future; Expected date: 10/11/2023  -     Lipid Panel; Future; Expected date: 10/11/2023  -     Hemoglobin A1C; Future; Expected date: 10/11/2023    Colon cancer screening  -     Ambulatory referral/consult to Endo Procedure ; Future; Expected date: 10/12/2023    Declined flu vaccine  Medication List with Changes/Refills   Current Medications    DEXTROAMPHETAMINE-AMPHETAMINE (ADDERALL XR) 25 MG 24 HR CAPSULE    Take 25 mg by mouth every morning.    EPINEPHRINE (EPIPEN) 0.3 MG/0.3 ML ATIN    Inject 0.3 mLs (0.3 mg total) into the muscle once. for 1 dose    GABAPENTIN (NEURONTIN) 300 MG CAPSULE    Take 1 capsule (300 mg total) by mouth every evening.    MULTIVITAMIN,TX-MINERALS TAB    Take by mouth once daily.    TRIAMCINOLONE ACETONIDE 0.1% (KENALOG) 0.1 % OINTMENT    Apply topically 2 (two) times daily.   Discontinued Medications    VYVANSE 40 MG CAP    Take 40 mg by mouth every morning.

## 2023-10-12 ENCOUNTER — TELEPHONE (OUTPATIENT)
Dept: INTERNAL MEDICINE | Facility: CLINIC | Age: 43
End: 2023-10-12
Payer: COMMERCIAL

## 2023-10-12 NOTE — TELEPHONE ENCOUNTER
Spoke with pt, let her know  has not reviewed the labs yet and she is out of the office today. Pt informed that once  reviews the results we will be in contact with her. Pt verbalized understanding

## 2023-10-12 NOTE — TELEPHONE ENCOUNTER
----- Message from Ernie Mehta sent at 10/12/2023 10:54 AM CDT -----  Contact: [phone]  Patient is calling for LAB results. Please call patient at [phone]. Thanks KB

## 2023-10-13 DIAGNOSIS — R73.9 HYPERGLYCEMIA: Primary | ICD-10-CM

## 2023-10-27 ENCOUNTER — PATIENT MESSAGE (OUTPATIENT)
Dept: INTERNAL MEDICINE | Facility: CLINIC | Age: 43
End: 2023-10-27
Payer: COMMERCIAL

## 2023-11-06 ENCOUNTER — OFFICE VISIT (OUTPATIENT)
Dept: URGENT CARE | Facility: CLINIC | Age: 43
End: 2023-11-06
Payer: COMMERCIAL

## 2023-11-06 VITALS
TEMPERATURE: 98 F | DIASTOLIC BLOOD PRESSURE: 71 MMHG | WEIGHT: 152 LBS | HEIGHT: 64 IN | OXYGEN SATURATION: 98 % | SYSTOLIC BLOOD PRESSURE: 128 MMHG | BODY MASS INDEX: 25.95 KG/M2 | RESPIRATION RATE: 18 BRPM | HEART RATE: 71 BPM

## 2023-11-06 DIAGNOSIS — J06.9 UPPER RESPIRATORY TRACT INFECTION, UNSPECIFIED TYPE: Primary | ICD-10-CM

## 2023-11-06 LAB
CTP QC/QA: YES
SARS-COV-2 AG RESP QL IA.RAPID: NEGATIVE

## 2023-11-06 PROCEDURE — 99213 OFFICE O/P EST LOW 20 MIN: CPT | Mod: S$GLB,,, | Performed by: NURSE PRACTITIONER

## 2023-11-06 PROCEDURE — 99213 PR OFFICE/OUTPT VISIT, EST, LEVL III, 20-29 MIN: ICD-10-PCS | Mod: S$GLB,,, | Performed by: NURSE PRACTITIONER

## 2023-11-06 PROCEDURE — 87811 SARS-COV-2 COVID19 W/OPTIC: CPT | Mod: QW,S$GLB,, | Performed by: NURSE PRACTITIONER

## 2023-11-06 PROCEDURE — 87811 SARS CORONAVIRUS 2 ANTIGEN POCT, MANUAL READ: ICD-10-PCS | Mod: QW,S$GLB,, | Performed by: NURSE PRACTITIONER

## 2023-11-06 RX ORDER — AZELASTINE 1 MG/ML
1 SPRAY, METERED NASAL 2 TIMES DAILY
Qty: 30 ML | Refills: 2 | Status: SHIPPED | OUTPATIENT
Start: 2023-11-06

## 2023-11-06 NOTE — LETTER
November 6, 2023      [facility]  [address], [address]  [facility] [address]  Phone: [phone]  Fax: [phone]       Patient: Duane Romero   YOB: 1980  Date of Visit: 11/06/2023    To Whom It May Concern:    Napoleon Romero  was at [facility] on 11/06/2023. The patient may return to work/school on 11/06/2023 or 11/07/2023  with no restrictions. If you have any questions or concerns, or if I can be of further assistance, please do not hesitate to contact me.    Sincerely,         Eliseo Parker NP

## 2023-11-06 NOTE — PROGRESS NOTES
"Subjective:      Patient ID: Duane Romero is a 43 y.o. female.    Vitals:  height is 5' 4" (1.626 m) and weight is 68.9 kg (152 lb). Her tympanic temperature is 97.8 °F (36.6 °C). Her blood pressure is 128/71 and her pulse is 71. Her respiration is 18 and oxygen saturation is 98%.     Chief Complaint: Sinus Problem    Duane Romero is a 43 y.o female who is presenting for evaluation of sinus problem. Associated sxs include postnasal drip, ear pain, and cough which onset 3 days ago on Friday. Treatments tied include zyxal which was last taken Saturday. Pt reports she suffers from seasonal allergies, but over the past 3 days the sxs have progressed.     Sinus Problem  This is a new problem. The current episode started in the past 7 days. The problem has been gradually worsening since onset. Associated symptoms include coughing, ear pain and headaches. Pertinent negatives include no congestion, hoarse voice or sore throat. The treatment provided no relief.       HENT:  Positive for ear pain. Negative for congestion and sore throat.    Respiratory:  Positive for cough.    Neurological:  Positive for headaches.      Objective:     Vitals:    11/06/23 0846   BP: 128/71   BP Location: Left arm   Patient Position: Sitting   BP Method: Medium (Automatic)   Pulse: 71   Resp: 18   Temp: 97.8 °F (36.6 °C)   TempSrc: Tympanic   SpO2: 98%   Weight: 68.9 kg (152 lb)   Height: 5' 4" (1.626 m)       Physical Exam   Constitutional: She is oriented to person, place, and time. She appears well-developed. She is cooperative.  Non-toxic appearance. She does not appear ill. No distress.   HENT:   Head: Normocephalic and atraumatic.   Ears:   Right Ear: Hearing, external ear and ear canal normal. A middle ear effusion is present.   Left Ear: Hearing, external ear and ear canal normal. A middle ear effusion is present.   Nose: Congestion present. No mucosal edema, rhinorrhea or nasal deformity. No epistaxis. Right sinus " exhibits no maxillary sinus tenderness and no frontal sinus tenderness. Left sinus exhibits no maxillary sinus tenderness and no frontal sinus tenderness.   Mouth/Throat: Uvula is midline, oropharynx is clear and moist and mucous membranes are normal. Mucous membranes are moist. No trismus in the jaw. Normal dentition. No uvula swelling. No oropharyngeal exudate, posterior oropharyngeal edema or posterior oropharyngeal erythema.   Eyes: Conjunctivae and lids are normal. Pupils are equal, round, and reactive to light. No scleral icterus. Extraocular movement intact   Neck: Trachea normal and phonation normal. Neck supple. No edema present. No erythema present. No neck rigidity present.   Cardiovascular: Normal rate, regular rhythm, normal heart sounds and normal pulses.   Pulmonary/Chest: Effort normal and breath sounds normal. No respiratory distress. She has no decreased breath sounds. She has no rhonchi.   Abdominal: Normal appearance.   Musculoskeletal: Normal range of motion.         General: No deformity. Normal range of motion.   Neurological: She is alert and oriented to person, place, and time. She exhibits normal muscle tone. Coordination normal.   Skin: Skin is warm, dry, intact, not diaphoretic and not pale.   Psychiatric: Her speech is normal and behavior is normal. Judgment and thought content normal.   Nursing note and vitals reviewed.      Assessment:     1. Upper respiratory tract infection, unspecified type      Results for orders placed or performed in visit on 11/06/23   SARS Coronavirus 2 Antigen, POCT Manual Read   Result Value Ref Range    SARS Coronavirus 2 Antigen Negative Negative     Acceptable Yes        Plan:   Patient stable for discharge and home management of condition      Upper respiratory tract infection, unspecified type  -     SARS Coronavirus 2 Antigen, POCT Manual Read  -     azelastine (ASTELIN) 137 mcg (0.1 %) nasal spray; 1 spray (137 mcg total) by Nasal route  2 (two) times daily.  Dispense: 30 mL; Refill: 2      Patient Instructions     Patient Instructions   Increase fluids   Rest activity ad melody   Continue with xyzal  nightly  Astelin nasal spray twice a day   Tylenol 650 mg every 4-6 hrs as needed for fever headache body aches   Advil 200 mg 2-3 tabs every 6 hrs as needed for fever, headache body aches---Must take with food   Supportive care measures   Viral infections usually resolve in 7-10 days; If symptoms persist or worsen follow up UC or PCP   Repeat Covid testing advised in 2-3 days

## 2023-11-06 NOTE — PATIENT INSTRUCTIONS
Patient Instructions   Increase fluids   Rest activity ad melody   Continue with xyzal  nightly  Astelin nasal spray twice a day   Tylenol 650 mg every 4-6 hrs as needed for fever headache body aches   Advil 200 mg 2-3 tabs every 6 hrs as needed for fever, headache body aches---Must take with food   Supportive care measures   Viral infections usually resolve in 7-10 days; If symptoms persist or worsen follow up UC or PCP   Repeat Covid testing advised in 2-3 days

## 2023-11-09 ENCOUNTER — TELEPHONE (OUTPATIENT)
Dept: URGENT CARE | Facility: CLINIC | Age: 43
End: 2023-11-09
Payer: COMMERCIAL

## 2023-11-13 ENCOUNTER — OFFICE VISIT (OUTPATIENT)
Dept: URGENT CARE | Facility: CLINIC | Age: 43
End: 2023-11-13
Payer: COMMERCIAL

## 2023-11-13 VITALS
RESPIRATION RATE: 18 BRPM | BODY MASS INDEX: 27.06 KG/M2 | WEIGHT: 158.5 LBS | DIASTOLIC BLOOD PRESSURE: 61 MMHG | HEART RATE: 78 BPM | TEMPERATURE: 97 F | SYSTOLIC BLOOD PRESSURE: 118 MMHG | HEIGHT: 64 IN | OXYGEN SATURATION: 99 %

## 2023-11-13 DIAGNOSIS — H66.93 BILATERAL OTITIS MEDIA, UNSPECIFIED OTITIS MEDIA TYPE: Primary | ICD-10-CM

## 2023-11-13 PROCEDURE — 99213 OFFICE O/P EST LOW 20 MIN: CPT | Mod: S$GLB,,, | Performed by: NURSE PRACTITIONER

## 2023-11-13 PROCEDURE — 99213 PR OFFICE/OUTPT VISIT, EST, LEVL III, 20-29 MIN: ICD-10-PCS | Mod: S$GLB,,, | Performed by: NURSE PRACTITIONER

## 2023-11-13 RX ORDER — AZITHROMYCIN 250 MG/1
TABLET, FILM COATED ORAL
Qty: 6 TABLET | Refills: 0 | Status: SHIPPED | OUTPATIENT
Start: 2023-11-13 | End: 2023-11-18

## 2023-11-13 RX ORDER — DEXTROAMPHETAMINE SACCHARATE, AMPHETAMINE ASPARTATE MONOHYDRATE, DEXTROAMPHETAMINE SULFATE AND AMPHETAMINE SULFATE 7.5; 7.5; 7.5; 7.5 MG/1; MG/1; MG/1; MG/1
CAPSULE, EXTENDED RELEASE ORAL EVERY MORNING
COMMUNITY
Start: 2023-11-07

## 2023-11-13 NOTE — PROGRESS NOTES
"Subjective:      Patient ID: Duane Romero is a 43 y.o. female.    Vitals:  height is 5' 3.86" (1.622 m) and weight is 71.9 kg (158 lb 8.2 oz). Her tympanic temperature is 96.9 °F (36.1 °C). Her blood pressure is 118/61 and her pulse is 78. Her respiration is 18 and oxygen saturation is 99%.     Chief Complaint: Sore Throat    Pt presents today with cough,sore throat, and ear pain (both). Pt was seen last week for these symptoms. Pt states that her ear pain 5/10 today. Pt states that she has taken the medication given to her. Has had 13 tube placement and damage to TM's. Has cochlear implants. She is concerned for an ear infection.     Sore Throat   This is a new problem. The current episode started 1 to 4 weeks ago. The problem has been unchanged. Neither side of throat is experiencing more pain than the other. There has been no fever. The fever has been present for Less than 1 day. The pain is at a severity of 5/10. The patient is experiencing no pain. Associated symptoms include congestion, coughing and ear pain. Pertinent negatives include no abdominal pain, diarrhea, drooling, ear discharge, headaches, hoarse voice, plugged ear sensation, neck pain, shortness of breath, stridor, swollen glands, trouble swallowing or vomiting. She has had no exposure to strep or mono. The treatment provided no relief.       Constitution: Positive for chills. Negative for fever.   HENT:  Positive for ear pain, congestion and sore throat. Negative for ear discharge, drooling and trouble swallowing.    Neck: Negative for neck pain.   Respiratory:  Positive for cough. Negative for shortness of breath and stridor.    Gastrointestinal:  Negative for abdominal pain, vomiting and diarrhea.   Neurological:  Negative for headaches.      Objective:     Physical Exam   Constitutional: She is oriented to person, place, and time. She appears well-developed. She is cooperative.  Non-toxic appearance. She does not appear ill. No " distress.   HENT:   Head: Normocephalic and atraumatic.   Ears:   Right Ear: No no drainage. Tympanic membrane is erythematous.   Left Ear: No no drainage. Tympanic membrane is erythematous.   Nose: Nose normal. No mucosal edema, rhinorrhea or nasal deformity. No epistaxis. Right sinus exhibits no maxillary sinus tenderness and no frontal sinus tenderness. Left sinus exhibits no maxillary sinus tenderness and no frontal sinus tenderness.   Mouth/Throat: Uvula is midline, oropharynx is clear and moist and mucous membranes are normal. No trismus in the jaw. Normal dentition. No uvula swelling. No oropharyngeal exudate, posterior oropharyngeal edema or posterior oropharyngeal erythema.   Eyes: Conjunctivae and lids are normal. No scleral icterus.   Neck: Trachea normal and phonation normal. Neck supple. No edema present. No erythema present. No neck rigidity present.   Cardiovascular: Normal rate, regular rhythm, normal heart sounds and normal pulses.   Pulmonary/Chest: Effort normal and breath sounds normal. No respiratory distress. She has no decreased breath sounds. She has no rhonchi.   Abdominal: Normal appearance.   Musculoskeletal: Normal range of motion.         General: No deformity. Normal range of motion.   Neurological: She is alert and oriented to person, place, and time. She exhibits normal muscle tone. Coordination normal.   Skin: Skin is warm, dry, intact, not diaphoretic and not pale.   Psychiatric: Her speech is normal and behavior is normal. Judgment and thought content normal.   Nursing note and vitals reviewed.      Assessment:     1. Bilateral otitis media, unspecified otitis media type        Plan:       Bilateral otitis media, unspecified otitis media type  -     azithromycin (Z-ART) 250 MG tablet; Take 2 tablets by mouth on day 1; Take 1 tablet by mouth on days 2-5  Dispense: 6 tablet; Refill: 0      Patient counseled on symptomatic treatment.   -- Antihistamines  -- Gargles and lozenges for  sore throat  - OTC pain/fever relievers  Nasal sprays  Follow up with PCP or ER immediately for worsening, new symptoms or no improvement. Go to ER if you develop fever of 103 or higher, chest pain, shortness of breath, upper back pain, stiff neck or severe headache.      Diagnosis, treatment, AVS reviewed with patient. Patient understands and agrees with plan

## 2023-11-15 ENCOUNTER — PATIENT MESSAGE (OUTPATIENT)
Dept: INTERNAL MEDICINE | Facility: CLINIC | Age: 43
End: 2023-11-15
Payer: COMMERCIAL

## 2023-11-16 ENCOUNTER — TELEPHONE (OUTPATIENT)
Dept: URGENT CARE | Facility: CLINIC | Age: 43
End: 2023-11-16
Payer: COMMERCIAL

## 2023-11-17 ENCOUNTER — E-VISIT (OUTPATIENT)
Dept: FAMILY MEDICINE | Facility: CLINIC | Age: 43
End: 2023-11-17
Payer: COMMERCIAL

## 2023-11-17 DIAGNOSIS — R05.9 COUGH, UNSPECIFIED TYPE: Primary | ICD-10-CM

## 2023-11-17 DIAGNOSIS — J32.9 SINUSITIS, UNSPECIFIED CHRONICITY, UNSPECIFIED LOCATION: ICD-10-CM

## 2023-11-17 PROCEDURE — 99422 OL DIG E/M SVC 11-20 MIN: CPT | Mod: ,,, | Performed by: STUDENT IN AN ORGANIZED HEALTH CARE EDUCATION/TRAINING PROGRAM

## 2023-11-17 PROCEDURE — 99422 PR E&M, ONLINE DIGIT, EST, < 7 DAYS,  11-20 MINS: ICD-10-PCS | Mod: ,,, | Performed by: STUDENT IN AN ORGANIZED HEALTH CARE EDUCATION/TRAINING PROGRAM

## 2023-11-17 RX ORDER — DOXYCYCLINE HYCLATE 100 MG
100 TABLET ORAL 2 TIMES DAILY
Qty: 14 TABLET | Refills: 0 | Status: SHIPPED | OUTPATIENT
Start: 2023-11-17 | End: 2023-11-24

## 2023-11-17 RX ORDER — OXYMETAZOLINE HCL 0.05 %
2 SPRAY, NON-AEROSOL (ML) NASAL 2 TIMES DAILY
Qty: 6 ML | Refills: 0 | Status: SHIPPED | OUTPATIENT
Start: 2023-11-17 | End: 2023-11-20

## 2023-11-17 RX ORDER — PROMETHAZINE HYDROCHLORIDE AND DEXTROMETHORPHAN HYDROBROMIDE 6.25; 15 MG/5ML; MG/5ML
5 SYRUP ORAL EVERY 6 HOURS PRN
Qty: 118 ML | Refills: 1 | Status: SHIPPED | OUTPATIENT
Start: 2023-11-17 | End: 2023-11-27

## 2023-11-17 NOTE — PROGRESS NOTES
Patient ID: Duane Romero is a 43 y.o. female.    Chief Complaint: Cough          274}  The patient initiated a request through [employer] on 11/17/2023 for evaluation and management with a chief complaint of Cough     I evaluated the questionnaire submission on 11/17/2023 .    [facility] Peq Evisit Upper Respitatory/Cough Questionnaire    11/17/2023  9:25 AM CST - Filed by Patient   Do you agree to participate in an E-Visit? Yes   If you have any of the following symptoms, please present to your local ER or call 911:  I acknowledge   What is the main issue that you would like for your doctor to address today? My coughinh   Are you able to take your vital signs? No   Are you currently pregnant, could you be pregnant, or are you breast feeding? None of the above   What symptoms do you currently have?  Cough;  Runny nose   Describe your cough: Productive (containing mucus)   Describe the mucus: Clear   Have you ever smoked? I have never smoked   Have you had a fever? Yes   What has been the range of your fever? Less than 100.4   When did your symptoms first appear? 11/6/2023   In the last two weeks, have you been in close contact with someone who has COVID-19 or the Flu? No   In the last two weeks, have you worked or volunteered in a healthcare facility or as a ? Healthcare facilities include a hospital, medical or dental clinic, long-term care facility, or nursing home No   Do you live in a long-term care facility, nursing home, group home, or homeless shelter? No   List what you have done or taken to help your symptoms. Zyzal, z-pack, prescriped nose spray   How severe are your symptoms? Moderate   Have your symptoms improved since they first appeared? Worse   Have you taken an at home Covid test? No   Have you taken a Flu test? No   Have you been fully vaccinated for COVID? (2 Pfizer, 2 Moderna or 1 Loco & Loco vaccine injections) No   Have you received your first dose of the Pfizer or Moderna  COVID vaccine? No   Have you recieved a Flu shot? No   Do you have transportation to get tested for COVID if it is indicated and ordered for you at an [facility] location? Yes   Provide any information you feel is important to your history not asked above I have been tested for covid and it came back negative. The fall season is one of the season where my allergies are bad. The only thing that got worse since  is having fever.   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   Latest known visit with results is:   Office Visit on 2023   Component Date Value Ref Range Status    SARS Coronavirus 2 Antigen 2023 Negative  Negative Final     Acceptable 2023 Yes   Final       Encounter Diagnoses   Name Primary?    Cough, unspecified type Yes    Sinusitis, unspecified chronicity, unspecified location         No orders of the defined types were placed in this encounter.     Medications Ordered This Encounter   Medications    doxycycline (VIBRA-TABS) 100 MG tablet     Sig: Take 1 tablet (100 mg total) by mouth 2 (two) times daily. for 7 days     Dispense:  14 tablet     Refill:  0    oxymetazoline (AFRIN, OXYMETAZOLINE,) 0.05 % nasal spray     Si sprays by Nasal route 2 (two) times daily. Do not take over 3 days due to rebound congestion for 3 days     Dispense:  6 mL     Refill:  0    promethazine-dextromethorphan (PROMETHAZINE-DM) 6.25-15 mg/5 mL Syrp     Sig: Take 5 mLs by mouth every 6 (six) hours as needed  (cough).     Dispense:  118 mL     Refill:  1        E-Visit Time Tracking:    Day 1 Time (in minutes): 12     Total Time (in minutes): 12       274}

## 2024-02-09 ENCOUNTER — PATIENT MESSAGE (OUTPATIENT)
Dept: INTERNAL MEDICINE | Facility: CLINIC | Age: 44
End: 2024-02-09
Payer: COMMERCIAL

## 2024-02-19 ENCOUNTER — OFFICE VISIT (OUTPATIENT)
Dept: URGENT CARE | Facility: CLINIC | Age: 44
End: 2024-02-19
Payer: COMMERCIAL

## 2024-02-19 VITALS
OXYGEN SATURATION: 96 % | DIASTOLIC BLOOD PRESSURE: 69 MMHG | WEIGHT: 153.69 LBS | HEART RATE: 94 BPM | SYSTOLIC BLOOD PRESSURE: 132 MMHG | HEIGHT: 64 IN | BODY MASS INDEX: 26.24 KG/M2 | TEMPERATURE: 99 F | RESPIRATION RATE: 19 BRPM

## 2024-02-19 DIAGNOSIS — R09.82 POSTNASAL DISCHARGE: ICD-10-CM

## 2024-02-19 DIAGNOSIS — J30.9 CHRONIC ALLERGIC RHINITIS: ICD-10-CM

## 2024-02-19 DIAGNOSIS — J34.89 SINUS PRESSURE: Primary | ICD-10-CM

## 2024-02-19 DIAGNOSIS — R05.9 COUGH, UNSPECIFIED TYPE: ICD-10-CM

## 2024-02-19 LAB
CTP QC/QA: YES
CTP QC/QA: YES
POC MOLECULAR INFLUENZA A AGN: NEGATIVE
POC MOLECULAR INFLUENZA B AGN: NEGATIVE
SARS-COV-2 AG RESP QL IA.RAPID: NEGATIVE

## 2024-02-19 PROCEDURE — 87811 SARS-COV-2 COVID19 W/OPTIC: CPT | Mod: QW,S$GLB,, | Performed by: NURSE PRACTITIONER

## 2024-02-19 PROCEDURE — 99214 OFFICE O/P EST MOD 30 MIN: CPT | Mod: S$GLB,,, | Performed by: NURSE PRACTITIONER

## 2024-02-19 PROCEDURE — 87502 INFLUENZA DNA AMP PROBE: CPT | Mod: QW,S$GLB,, | Performed by: NURSE PRACTITIONER

## 2024-02-19 RX ORDER — AZELASTINE 1 MG/ML
1 SPRAY, METERED NASAL 2 TIMES DAILY
Qty: 30 ML | Refills: 0 | Status: SHIPPED | OUTPATIENT
Start: 2024-02-19 | End: 2024-02-22

## 2024-02-19 RX ORDER — AZITHROMYCIN 250 MG/1
TABLET, FILM COATED ORAL
Qty: 6 TABLET | Refills: 0 | Status: SHIPPED | OUTPATIENT
Start: 2024-02-19 | End: 2024-02-22

## 2024-02-19 NOTE — PATIENT INSTRUCTIONS
Sinusitis       Take antibiotics with food and as directed.     Flonase (fluticasone) is a nasal spray which is available over the counter and may help with your symptoms     If you just have drainage you can take plain zyrtec, claritin or allegra     If you just have a congested feeling you can take guaifenesin     Rest and fluids are also important.     Tylenol or ibuprofen can also be used as directed for pain unless you have an allergy to them or medical condition such as stomach ulcers, kidney or liver disease or blood thinners etc for which you should not be taking these type of medications.       If your condition worsens or fails to improve we recommend that you receive another evaluation at the urgent care/ER immediately or contact your PCP to discuss your concerns. You must understand that you've received an urgent care treatment only and that you may be released before all your medical problems are known or treated. You the patient will arrange for followup care as instructed.

## 2024-02-19 NOTE — PROGRESS NOTES
"Subjective:      Patient ID: Duane Romero is a 43 y.o. female.    Vitals:  height is 5' 4.06" (1.627 m) and weight is 69.7 kg (153 lb 10.6 oz). Her tympanic temperature is 98.9 °F (37.2 °C). Her blood pressure is 132/69 and her pulse is 94. Her respiration is 19 and oxygen saturation is 96%.     Chief Complaint: Sinus Problem    Patient is a 43-year-old female with a history of chronic environmental allergies who presents for evaluation of cough and postnasal drip with associated sinus congestion.  States congestion and sinus pressure has been worsening over the last 5-7 days.  Treating with over-the-counter medication without relief.  Does report exposure to flu.  Denies fever, chills, nausea, vomiting, diarrhea, dyspnea, wheezing.  No recent travel reported.  No other concerns voiced.    Sinus Problem  This is a new problem. The current episode started in the past 7 days. The problem has been gradually worsening since onset. There has been no fever. Her pain is at a severity of 0/10. She is experiencing no pain. Associated symptoms include congestion, coughing, headaches and sinus pressure. Pertinent negatives include no chills, diaphoresis, ear pain, hoarse voice, neck pain, shortness of breath, sneezing, sore throat or swollen glands. Past treatments include oral decongestants and acetaminophen. The treatment provided no relief.       Constitution: Negative for chills, sweating and fever.   HENT:  Positive for congestion, postnasal drip, sinus pain and sinus pressure. Negative for ear pain and sore throat.    Neck: Negative for neck pain.   Respiratory:  Positive for cough. Negative for sputum production, shortness of breath, stridor and wheezing.    Gastrointestinal:  Negative for nausea, vomiting and diarrhea.   Skin:  Negative for rash.   Allergic/Immunologic: Negative for sneezing.   Neurological:  Positive for headaches.      Objective:     Physical Exam   Constitutional: She is oriented to " person, place, and time. She appears well-developed. She is cooperative.  Non-toxic appearance. She does not appear ill. No distress.   HENT:   Head: Normocephalic and atraumatic.   Ears:   Right Ear: Hearing, tympanic membrane, external ear and ear canal normal.   Left Ear: Hearing, tympanic membrane, external ear and ear canal normal.   Nose: Congestion present. No mucosal edema, rhinorrhea or nasal deformity. No epistaxis. Right sinus exhibits maxillary sinus tenderness. Right sinus exhibits no frontal sinus tenderness. Left sinus exhibits maxillary sinus tenderness. Left sinus exhibits no frontal sinus tenderness.   Mouth/Throat: Uvula is midline and mucous membranes are normal. No trismus in the jaw. Normal dentition. No uvula swelling. Posterior oropharyngeal erythema present. No oropharyngeal exudate or posterior oropharyngeal edema.   Eyes: Conjunctivae and lids are normal. No scleral icterus.   Neck: Trachea normal and phonation normal. Neck supple. No edema present. No erythema present. No neck rigidity present.   Cardiovascular: Normal rate, regular rhythm, normal heart sounds and normal pulses.   Pulmonary/Chest: Effort normal. No respiratory distress. She has no decreased breath sounds. She has no wheezes. She has no rhonchi. She has rales.   Abdominal: Normal appearance.   Musculoskeletal: Normal range of motion.         General: No deformity. Normal range of motion.   Neurological: She is alert and oriented to person, place, and time. She exhibits normal muscle tone. Coordination normal.   Skin: Skin is warm, dry, intact, not diaphoretic and not pale.   Psychiatric: Her speech is normal and behavior is normal. Judgment and thought content normal.   Nursing note and vitals reviewed.      Assessment:     1. Sinus pressure    2. Postnasal discharge    3. Cough, unspecified type    4. Chronic allergic rhinitis        Plan:       Sinus pressure  -     SARS Coronavirus 2 Antigen, POCT Manual Read  -      POCT Influenza A/B MOLECULAR    Postnasal discharge    Cough, unspecified type    Chronic allergic rhinitis    Other orders  -     azelastine (ASTELIN) 137 mcg (0.1 %) nasal spray; 1 spray (137 mcg total) by Nasal route 2 (two) times daily.  Dispense: 30 mL; Refill: 0  -     azithromycin (Z-ART) 250 MG tablet; Take 2 tablets by mouth on day 1; Take 1 tablet by mouth on days 2-5  Dispense: 6 tablet; Refill: 0          Medical Decision Making:   Initial Assessment:   Nontoxic appearing 44 yo female c/o sinus pressure.  After complete evaluation, including thorough history and physical exam, the patient's symptoms are most likely due to sinusitis. Patient reports symptoms greater than 7-10 days with associated sinus pressure. There are no concerning features on physical exam to suggest bacterial otitis media/externa, strep pharyngitis, or peritonsillar abscess. Vital signs do not suggest sepsis. Lung sounds are clear and not consistent with pneumonia. There is no neck pain or limited ROM to suggest retropharyngeal abscess or meningitis. Vital signs are stable and there are no signs of acute distress in clinic. The patient will be treated with supportive care. Will provide RX for azithromycin upon D/C.     Clinical Tests:   Lab Tests: Reviewed       <> Summary of Lab: Covid negative  Flu negative          Results for orders placed or performed in visit on 02/19/24   SARS Coronavirus 2 Antigen, POCT Manual Read   Result Value Ref Range    SARS Coronavirus 2 Antigen Negative Negative     Acceptable Yes    POCT Influenza A/B MOLECULAR   Result Value Ref Range    POC Molecular Influenza A Ag Negative Negative, Not Reported    POC Molecular Influenza B Ag Negative Negative, Not Reported     Acceptable Yes      Patient Instructions   Sinusitis       Take antibiotics with food and as directed.     Flonase (fluticasone) is a nasal spray which is available over the counter and may help with your  symptoms     If you just have drainage you can take plain zyrtec, claritin or allegra     If you just have a congested feeling you can take guaifenesin     Rest and fluids are also important.     Tylenol or ibuprofen can also be used as directed for pain unless you have an allergy to them or medical condition such as stomach ulcers, kidney or liver disease or blood thinners etc for which you should not be taking these type of medications.       If your condition worsens or fails to improve we recommend that you receive another evaluation at the urgent care/ER immediately or contact your PCP to discuss your concerns. You must understand that you've received an urgent care treatment only and that you may be released before all your medical problems are known or treated. You the patient will arrange for followup care as instructed.

## 2024-02-19 NOTE — LETTER
February 19, 2024      [facility]  [address], [address]  [facility] [address]  Phone: [phone]  Fax: [phone]       Patient: Duane Romero   YOB: 1980  Date of Visit: 02/19/2024    To Whom It May Concern:    Napoleon Romero  was at [facility] on 02/19/2024. The patient may return to work/school on 02/20/24 with no restrictions. If you have any questions or concerns, or if I can be of further assistance, please do not hesitate to contact me.    Sincerely,      Symone Wolfe, NP

## 2024-02-20 DIAGNOSIS — J01.90 ACUTE BACTERIAL SINUSITIS: Primary | ICD-10-CM

## 2024-02-20 DIAGNOSIS — B96.89 ACUTE BACTERIAL SINUSITIS: Primary | ICD-10-CM

## 2024-02-20 RX ORDER — DOXYCYCLINE 100 MG/1
100 CAPSULE ORAL EVERY 12 HOURS
Qty: 14 CAPSULE | Refills: 0 | Status: SHIPPED | OUTPATIENT
Start: 2024-02-20 | End: 2024-02-27

## 2024-02-22 ENCOUNTER — OFFICE VISIT (OUTPATIENT)
Dept: INTERNAL MEDICINE | Facility: CLINIC | Age: 44
End: 2024-02-22
Payer: COMMERCIAL

## 2024-02-22 VITALS
SYSTOLIC BLOOD PRESSURE: 130 MMHG | DIASTOLIC BLOOD PRESSURE: 80 MMHG | OXYGEN SATURATION: 99 % | BODY MASS INDEX: 26.12 KG/M2 | WEIGHT: 153 LBS | HEART RATE: 90 BPM | HEIGHT: 64 IN

## 2024-02-22 DIAGNOSIS — J30.9 CHRONIC ALLERGIC RHINITIS: Primary | ICD-10-CM

## 2024-02-22 PROCEDURE — 99999 PR PBB SHADOW E&M-EST. PATIENT-LVL III: CPT | Mod: PBBFAC,,, | Performed by: INTERNAL MEDICINE

## 2024-02-22 PROCEDURE — 3075F SYST BP GE 130 - 139MM HG: CPT | Mod: CPTII,S$GLB,, | Performed by: INTERNAL MEDICINE

## 2024-02-22 PROCEDURE — 3079F DIAST BP 80-89 MM HG: CPT | Mod: CPTII,S$GLB,, | Performed by: INTERNAL MEDICINE

## 2024-02-22 PROCEDURE — 3008F BODY MASS INDEX DOCD: CPT | Mod: CPTII,S$GLB,, | Performed by: INTERNAL MEDICINE

## 2024-02-22 PROCEDURE — 99213 OFFICE O/P EST LOW 20 MIN: CPT | Mod: S$GLB,,, | Performed by: INTERNAL MEDICINE

## 2024-02-22 PROCEDURE — 1159F MED LIST DOCD IN RCRD: CPT | Mod: CPTII,S$GLB,, | Performed by: INTERNAL MEDICINE

## 2024-02-22 RX ORDER — HYDROCORTISONE 25 MG/ML
LOTION TOPICAL 2 TIMES DAILY
Qty: 59 ML | Refills: 0 | Status: SHIPPED | OUTPATIENT
Start: 2024-02-22

## 2024-02-22 RX ORDER — MONTELUKAST SODIUM 10 MG/1
10 TABLET ORAL NIGHTLY
Qty: 30 TABLET | Refills: 0 | Status: SHIPPED | OUTPATIENT
Start: 2024-02-22 | End: 2024-03-15

## 2024-02-22 RX ORDER — FLUTICASONE PROPIONATE 50 MCG
1 SPRAY, SUSPENSION (ML) NASAL DAILY
Qty: 16 G | Refills: 0 | Status: SHIPPED | OUTPATIENT
Start: 2024-02-22 | End: 2024-03-15

## 2024-02-22 NOTE — LETTER
February 22, 2024    Duane Romero  [address] [facility] [id number] [place] [address] Internal Medicine  Internal Medicine  [address] [facility] [address]  Phone: [phone]  Fax: [phone]   February 22, 2024     Patient: Duane Romero   YOB: 1980   Date of Visit: 2/22/2024       To Whom it May Concern:    Duane Romero was seen in my clinic on 2/22/2024. She may return to work on 2/23/24 If She is better .    Please excuse her from any classes or work missed.    If you have any questions or concerns, please don't hesitate to call.    Sincerely,         Jonathon Segura MD

## 2024-02-22 NOTE — PROGRESS NOTES
"HPI:  Patient is a 43-year-old female who comes in today with complaints of sinus drip and drainage congestion has been going on now for several weeks.  Patient has been seen in urgent care clinic and started on initially Z-Brian and then changed to doxycycline.  Patient states she has spring and fall time allergies every year.  Patient denies any fever.  She denies any sputum production.  She currently is using Allegra and Astelin nasal spray    Current meds have been verified and updated per the EMR  Exam:/80 (BP Location: Right arm)   Pulse 90   Ht 5' 4" (1.626 m)   Wt 69.4 kg (153 lb)   LMP 05/30/2021   SpO2 99%   BMI 26.26 kg/m²   TMs are unremarkable  Neck is supple without adenopathy  Nasal mucosa is very hyperemic edematous  Chest is clear    Lab Results   Component Value Date    WBC 5.58 10/11/2023    HGB 14.4 10/11/2023    HCT 42.6 10/11/2023     10/11/2023    CHOL 205 (H) 10/11/2023    TRIG 82 10/11/2023    HDL 53 10/11/2023    ALT 16 10/11/2023    AST 13 10/11/2023     10/11/2023    K 3.8 10/11/2023     10/11/2023    CREATININE 0.8 10/11/2023    BUN 14 10/11/2023    CO2 25 10/11/2023    TSH 1.056 06/06/2022    HGBA1C 5.6 10/11/2023       Impression:  Chronic allergic rhinitis  Patient Active Problem List   Diagnosis    Obesity (BMI 30.0-34.9)    Palpitations    WILLIS (dyspnea on exertion)    Insulin resistance    Metabolic syndrome    Prediabetes    Hyperlipidemia    Conductive hearing loss of both ears    Spontaneous miscarriage       Plan:  Orders Placed This Encounter    hydrocortisone 2.5 % lotion    fluticasone propionate (FLONASE) 50 mcg/actuation nasal spray    montelukast (SINGULAIR) 10 mg tablet     She was given Flonase and Singulair to add to her Allegra and Astelin spray.  She was given some hydrocortisone for some mild seborrheic dermatitis of the ears and appear eyelids    This note is generated with speech recognition software and is subject to transcription " error and sound alike phrases that may be missed by proofreading.

## 2024-03-15 RX ORDER — FLUTICASONE PROPIONATE 50 MCG
SPRAY, SUSPENSION (ML) NASAL
Qty: 48 ML | Refills: 1 | Status: SHIPPED | OUTPATIENT
Start: 2024-03-15

## 2024-03-15 RX ORDER — MONTELUKAST SODIUM 10 MG/1
10 TABLET ORAL NIGHTLY
Qty: 90 TABLET | Refills: 1 | Status: SHIPPED | OUTPATIENT
Start: 2024-03-15

## 2024-03-28 ENCOUNTER — PATIENT MESSAGE (OUTPATIENT)
Dept: INTERNAL MEDICINE | Facility: CLINIC | Age: 44
End: 2024-03-28
Payer: COMMERCIAL

## 2024-03-28 NOTE — TELEPHONE ENCOUNTER
Notify we really shouldn't postpone the labs any longer - could be dangerously high. It was supposed to be done in January.  But we can certainly talk about the depression as well when she comes in

## 2024-03-28 NOTE — TELEPHONE ENCOUNTER
Pt wants to know if she can push lab appt back? Please see [employer] message, review and advise.

## 2024-04-02 ENCOUNTER — OFFICE VISIT (OUTPATIENT)
Dept: ALLERGY | Facility: CLINIC | Age: 44
End: 2024-04-02
Payer: COMMERCIAL

## 2024-04-02 ENCOUNTER — PATIENT OUTREACH (OUTPATIENT)
Dept: ADMINISTRATIVE | Facility: HOSPITAL | Age: 44
End: 2024-04-02
Payer: COMMERCIAL

## 2024-04-02 VITALS
TEMPERATURE: 98 F | HEART RATE: 80 BPM | WEIGHT: 153.69 LBS | DIASTOLIC BLOOD PRESSURE: 81 MMHG | BODY MASS INDEX: 26.38 KG/M2 | SYSTOLIC BLOOD PRESSURE: 130 MMHG

## 2024-04-02 DIAGNOSIS — Z88.9 DRUG ALLERGY: ICD-10-CM

## 2024-04-02 DIAGNOSIS — Z88.0 PENICILLIN ALLERGY: ICD-10-CM

## 2024-04-02 DIAGNOSIS — H01.119 EYELID DERMATITIS, ALLERGIC/CONTACT: Primary | ICD-10-CM

## 2024-04-02 PROCEDURE — 3075F SYST BP GE 130 - 139MM HG: CPT | Mod: CPTII,S$GLB,, | Performed by: ALLERGY & IMMUNOLOGY

## 2024-04-02 PROCEDURE — 3008F BODY MASS INDEX DOCD: CPT | Mod: CPTII,S$GLB,, | Performed by: ALLERGY & IMMUNOLOGY

## 2024-04-02 PROCEDURE — 99999 PR PBB SHADOW E&M-EST. PATIENT-LVL III: CPT | Mod: PBBFAC,,, | Performed by: ALLERGY & IMMUNOLOGY

## 2024-04-02 PROCEDURE — 1159F MED LIST DOCD IN RCRD: CPT | Mod: CPTII,S$GLB,, | Performed by: ALLERGY & IMMUNOLOGY

## 2024-04-02 PROCEDURE — 3079F DIAST BP 80-89 MM HG: CPT | Mod: CPTII,S$GLB,, | Performed by: ALLERGY & IMMUNOLOGY

## 2024-04-02 PROCEDURE — 99214 OFFICE O/P EST MOD 30 MIN: CPT | Mod: S$GLB,,, | Performed by: ALLERGY & IMMUNOLOGY

## 2024-04-02 NOTE — PROGRESS NOTES
"Subjective:      Patient ID: Duane Romero is a 43 y.o. female.    Chief Complaint:  Eczema and Allergy Testing    Last seen 5/31/2022  HPI: 43 year old female last seen in 2022- skin prick testing was significant to dust mites and pollen(grass). She was started on allergy immunotherapy and only took one injection. Due to work schedule, unable to take allergy immunotherapy.      Penicillin- "altered breathing and heart rate" over five years    She is interested in PCN allergy testing.    Prednisone- "my breathing gets weird and my heart rate goes up." She did not seek medical attention for symptoms.  She wants steroids for carpel tunnel and other joint concerns.    She reports atopic dermatitis.  Eyelids and ear lobes- started 1 year ago.            Past Medical History:   Diagnosis Date    Allergy     Asthma     Hyperlipidemia     Otitis media     Prediabetes     Sinusitis       Family History   Problem Relation Age of Onset    Hyperlipidemia Mother     Diabetes Father         DM2 insulin pump & oral    Hypertension Father     Obesity Father     Breast cancer Maternal Aunt     Cancer Maternal Aunt         Skin cancer    Thyroid disease Maternal Grandmother     Migraines Neg Hx     Asthma Neg Hx           Current Outpatient Medications on File Prior to Visit   Medication Sig Dispense Refill    dextroamphetamine-amphetamine (ADDERALL XR) 30 MG 24 hr capsule Take by mouth every morning.      hydrocortisone 2.5 % lotion Apply topically 2 (two) times daily. 59 mL 0    azelastine (ASTELIN) 137 mcg (0.1 %) nasal spray 1 spray (137 mcg total) by Nasal route 2 (two) times daily. (Patient not taking: Reported on 4/2/2024) 30 mL 2    dextroamphetamine-amphetamine (ADDERALL XR) 25 MG 24 hr capsule Take 25 mg by mouth every morning.      EPINEPHrine (EPIPEN) 0.3 mg/0.3 mL AtIn Inject 0.3 mLs (0.3 mg total) into the muscle once. for 1 dose (Patient not taking: Reported on 10/11/2023) 2 each 11    fluticasone propionate " (FLONASE) 50 mcg/actuation nasal spray USE 1 SPRAY (50 MCG TOTAL) IN EACH NOSTRIL ONCE DAILY (Patient not taking: Reported on 4/2/2024) 48 mL 1    montelukast (SINGULAIR) 10 mg tablet TAKE 1 TABLET BY MOUTH EVERY DAY IN THE EVENING (Patient not taking: Reported on 4/2/2024) 90 tablet 1    triamcinolone acetonide 0.1% (KENALOG) 0.1 % ointment Apply topically 2 (two) times daily. (Patient not taking: Reported on 4/2/2024) 30 g 1     No current facility-administered medications on file prior to visit.        Review of patient's allergies indicates:   Allergen Reactions    Paraffin     Pcn [penicillins] Itching     Injection only.  Can tolerate oral form    Prednisone      Other reaction(s): increased heart rate        Review of Systems   Constitutional:  Negative for chills and fever.   HENT:  Negative for congestion and rhinorrhea.    Eyes:  Positive for itching. Negative for discharge.   Respiratory:  Negative for cough, shortness of breath and wheezing.    Skin:  Positive for rash.   Allergic/Immunologic: Positive for environmental allergies. Negative for food allergies.   Neurological:  Negative for facial asymmetry and speech difficulty.   Psychiatric/Behavioral:  Negative for behavioral problems and suicidal ideas.        Objective:   Physical Exam  Vitals reviewed.   Constitutional:       General: She is not in acute distress.     Appearance: Normal appearance. She is well-developed. She is not ill-appearing, toxic-appearing or diaphoretic.   HENT:      Head: Normocephalic and atraumatic.      Right Ear: Tympanic membrane, ear canal and external ear normal. There is no impacted cerumen.      Left Ear: Tympanic membrane, ear canal and external ear normal. There is no impacted cerumen.      Nose: Nose normal. No congestion or rhinorrhea.      Mouth/Throat:      Pharynx: No oropharyngeal exudate or posterior oropharyngeal erythema.   Eyes:      General: No scleral icterus.        Right eye: No discharge.          Left eye: No discharge.      Pupils: Pupils are equal, round, and reactive to light.   Neck:      Thyroid: No thyromegaly.   Cardiovascular:      Rate and Rhythm: Normal rate and regular rhythm.      Heart sounds: Normal heart sounds. No murmur heard.     No friction rub. No gallop.   Pulmonary:      Effort: Pulmonary effort is normal. No respiratory distress.      Breath sounds: Normal breath sounds. No stridor. No wheezing, rhonchi or rales.   Chest:      Chest wall: No tenderness.   Musculoskeletal:         General: Normal range of motion.      Cervical back: Normal range of motion and neck supple. No rigidity or tenderness. No muscular tenderness.   Lymphadenopathy:      Cervical: No cervical adenopathy.   Skin:     General: Skin is warm.      Coloration: Skin is not jaundiced or pale.      Findings: No bruising or erythema.   Neurological:      General: No focal deficit present.      Mental Status: She is alert and oriented to person, place, and time.      Gait: Gait normal.   Psychiatric:         Mood and Affect: Mood normal.         Behavior: Behavior normal.         Thought Content: Thought content normal.         Judgment: Judgment normal.           Assessment:      1. Eyelid dermatitis, allergic/contact    2. Penicillin allergy    3. Drug allergy        Plan:     Eyelid dermatitis, allergic/contact    Penicillin allergy    Drug allergy       Will schedule for patch testing and PCN allergy testing  Encouraged her to ask her PCP what steroid he/she would like to use.  Advised that symptoms associated with prednisone are not an IgE mediated allergy, but typical side effects associated with this medication.    Recommend she avoid PCN and medications in this class.    Advised her to ask her PCP what steroid he wishes to use.    RTC 2-4 weeks for PCN allergy testing     BRYCE PARADA spent a total of 35 minutes on the day of the visit.This includes face to face time and non-face to face time preparing to see  the patient (eg, review of tests), obtaining and/or reviewing separately obtained history, documenting clinical information in the electronic or other health record, independently interpreting results and communicating results to the patient/family/caregiver, or care coordinator.

## 2024-04-03 ENCOUNTER — PATIENT MESSAGE (OUTPATIENT)
Dept: ALLERGY | Facility: CLINIC | Age: 44
End: 2024-04-03
Payer: COMMERCIAL

## 2024-04-05 ENCOUNTER — OFFICE VISIT (OUTPATIENT)
Dept: INTERNAL MEDICINE | Facility: CLINIC | Age: 44
End: 2024-04-05
Payer: COMMERCIAL

## 2024-04-05 ENCOUNTER — LAB VISIT (OUTPATIENT)
Dept: LAB | Facility: HOSPITAL | Age: 44
End: 2024-04-05
Attending: FAMILY MEDICINE
Payer: COMMERCIAL

## 2024-04-05 VITALS
TEMPERATURE: 97 F | WEIGHT: 156.06 LBS | SYSTOLIC BLOOD PRESSURE: 138 MMHG | BODY MASS INDEX: 26.64 KG/M2 | HEIGHT: 64 IN | OXYGEN SATURATION: 98 % | RESPIRATION RATE: 20 BRPM | DIASTOLIC BLOOD PRESSURE: 92 MMHG | HEART RATE: 76 BPM

## 2024-04-05 DIAGNOSIS — R73.9 HYPERGLYCEMIA: ICD-10-CM

## 2024-04-05 DIAGNOSIS — Z00.00 ROUTINE ADULT HEALTH MAINTENANCE: ICD-10-CM

## 2024-04-05 DIAGNOSIS — Z88.0 PENICILLIN ALLERGY: Primary | ICD-10-CM

## 2024-04-05 DIAGNOSIS — F43.21 ADJUSTMENT DISORDER WITH DEPRESSED MOOD: Primary | ICD-10-CM

## 2024-04-05 LAB
ANION GAP SERPL CALC-SCNC: 8 MMOL/L (ref 8–16)
BUN SERPL-MCNC: 14 MG/DL (ref 6–20)
CALCIUM SERPL-MCNC: 9 MG/DL (ref 8.7–10.5)
CHLORIDE SERPL-SCNC: 107 MMOL/L (ref 95–110)
CO2 SERPL-SCNC: 23 MMOL/L (ref 23–29)
CREAT SERPL-MCNC: 0.7 MG/DL (ref 0.5–1.4)
EST. GFR  (NO RACE VARIABLE): >60 ML/MIN/1.73 M^2
ESTIMATED AVG GLUCOSE: 108 MG/DL (ref 68–131)
GLUCOSE SERPL-MCNC: 138 MG/DL (ref 70–110)
HBA1C MFR BLD: 5.4 % (ref 4–5.6)
POTASSIUM SERPL-SCNC: 4.3 MMOL/L (ref 3.5–5.1)
SODIUM SERPL-SCNC: 138 MMOL/L (ref 136–145)

## 2024-04-05 PROCEDURE — 36415 COLL VENOUS BLD VENIPUNCTURE: CPT | Mod: PN | Performed by: FAMILY MEDICINE

## 2024-04-05 PROCEDURE — 1159F MED LIST DOCD IN RCRD: CPT | Mod: CPTII,S$GLB,, | Performed by: FAMILY MEDICINE

## 2024-04-05 PROCEDURE — 80048 BASIC METABOLIC PNL TOTAL CA: CPT | Performed by: FAMILY MEDICINE

## 2024-04-05 PROCEDURE — 3008F BODY MASS INDEX DOCD: CPT | Mod: CPTII,S$GLB,, | Performed by: FAMILY MEDICINE

## 2024-04-05 PROCEDURE — 3075F SYST BP GE 130 - 139MM HG: CPT | Mod: CPTII,S$GLB,, | Performed by: FAMILY MEDICINE

## 2024-04-05 PROCEDURE — 83036 HEMOGLOBIN GLYCOSYLATED A1C: CPT | Performed by: FAMILY MEDICINE

## 2024-04-05 PROCEDURE — 99214 OFFICE O/P EST MOD 30 MIN: CPT | Mod: S$GLB,,, | Performed by: FAMILY MEDICINE

## 2024-04-05 PROCEDURE — 99999 PR PBB SHADOW E&M-EST. PATIENT-LVL IV: CPT | Mod: PBBFAC,,, | Performed by: FAMILY MEDICINE

## 2024-04-05 PROCEDURE — 3080F DIAST BP >= 90 MM HG: CPT | Mod: CPTII,S$GLB,, | Performed by: FAMILY MEDICINE

## 2024-04-05 RX ORDER — SODIUM CHLORIDE 0.9 % (FLUSH) 0.9 %
0.1 SYRINGE (ML) INJECTION ONCE
OUTPATIENT
Start: 2024-06-07 | End: 2024-06-07

## 2024-04-05 RX ORDER — EPINEPHRINE 0.3 MG/.3ML
0.3 INJECTION SUBCUTANEOUS ONCE AS NEEDED
OUTPATIENT
Start: 2024-06-07

## 2024-04-05 RX ORDER — METHYLPREDNISOLONE SOD SUCC 125 MG
125 VIAL (EA) INJECTION ONCE AS NEEDED
OUTPATIENT
Start: 2024-06-07

## 2024-04-05 RX ORDER — AMOXICILLIN 500 MG/1
500 CAPSULE ORAL ONCE
OUTPATIENT
Start: 2024-06-07 | End: 2024-06-07

## 2024-04-05 RX ORDER — DIPHENHYDRAMINE HYDROCHLORIDE 50 MG/ML
25 INJECTION INTRAMUSCULAR; INTRAVENOUS ONCE AS NEEDED
OUTPATIENT
Start: 2024-06-07

## 2024-04-05 RX ORDER — BUPROPION HYDROCHLORIDE 150 MG/1
150 TABLET ORAL DAILY
Qty: 30 TABLET | Refills: 11 | Status: SHIPPED | OUTPATIENT
Start: 2024-04-05 | End: 2025-04-05

## 2024-04-05 NOTE — PROGRESS NOTES
Subjective:      Patient ID: Duane Romero is a 43 y.o. female.    Chief Complaint: Anxiety (Depression and stress discussion)      Patient here for follow-up.  Reports significantly increased stressors, going through divorce, has moved back into her parent's home for now.  She has been seeing a psychologist for therapy monthly.  Reports some dysphoria, feeling overwhelmed and stressed.  Also discussed most recent labs last year did show hyperglycemia with glucose over 200-she reports she was diagnosed with diabetes in the past but had lost a significant amount of weight, does report with recent stress she has not been eating optimally.    Anxiety  Patient reports no chest pain, confusion, nervous/anxious behavior or palpitations.         Review of Systems   Constitutional:  Negative for activity change and unexpected weight change.   HENT:  Negative for hearing loss, rhinorrhea and trouble swallowing.    Eyes:  Negative for discharge and visual disturbance.   Respiratory:  Negative for chest tightness and wheezing.    Cardiovascular:  Negative for chest pain and palpitations.   Gastrointestinal:  Negative for blood in stool, constipation, diarrhea and vomiting.   Endocrine: Negative for polydipsia and polyuria.   Genitourinary:  Negative for difficulty urinating, dysuria, hematuria and menstrual problem.   Musculoskeletal:  Negative for arthralgias, joint swelling and neck pain.   Neurological:  Negative for weakness and headaches.   Psychiatric/Behavioral:  Positive for dysphoric mood and sleep disturbance. Negative for confusion. The patient is not nervous/anxious.      Past Medical History:   Diagnosis Date    Allergy     Asthma     Hyperlipidemia     Otitis media     Prediabetes     Sinusitis           Past Surgical History:   Procedure Laterality Date    ADENOIDECTOMY       SECTION      CYST REMOVAL Right     Under right arm    DILATION AND CURETTAGE OF UTERUS      HERNIA REPAIR  2022     HYSTERECTOMY  05/25/2021    FRANCO/BS    IMPLANTATION OF BONE ANCHORED HEARING AID (BAHA) Bilateral 06/30/2022    Procedure: INSERTION, BAHA;  Surgeon: Jeff Kelley MD;  Location: [facility] OR;  Service: ENT;  Laterality: Bilateral;    TONSILLECTOMY      TUBAL LIGATION      TYMPANOPLASTY      TYMPANOSTOMY TUBE PLACEMENT      UMBILICAL HERNIA REPAIR N/A 06/24/2022    Procedure: REPAIR, HERNIA, UMBILICAL, AGE 5 YEARS OR OLDER;  Surgeon: Philipp Paiz MD;  Location: [facility];  Service: General;  Laterality: N/A;     Family History   Problem Relation Age of Onset    Hyperlipidemia Mother     Diabetes Father         DM2 insulin pump & oral    Hypertension Father     Obesity Father     Breast cancer Maternal Aunt     Cancer Maternal Aunt         Skin cancer    Thyroid disease Maternal Grandmother     Migraines Neg Hx     Asthma Neg Hx      Social History     Socioeconomic History    Marital status:    Tobacco Use    Smoking status: Never    Smokeless tobacco: Never   Substance and Sexual Activity    Alcohol use: Not Currently     Comment: Occas. last drink on 06/22/2022    Drug use: Never    Sexual activity: Not Currently     Partners: Male     Birth control/protection: None     Comment: I had a partial hysterectomy 5-   Social History Narrative    Patient goal(s): Weight of 130 lbs    Motivation: Stay healthy (prevent dz/need for rx) to enjoy outdoor activities with family or by herself    Breakfast: 1-2 sausages & 1-2 eggs with 1 slice bread; water & coffee w/ Stevia & sugar-free creamer    Lunch: Boneless chicken wings & 1 thin slice of cheesecake or 1 turkey sandwich on white bread, 1 slice of American cheese; water    Dinner: Hamburger (90:10 or 80:20 meat) with cheese & bread or chicken; water or wine 5.5 oz    Snacks: Apples or 1-2 small bag of chips daily (160 jamaal)    Eating out: 3-4x/wk    Water (oz/day): 102 oz/d    Physical Activity: 35-50 min/d of resistance/aerobic & 4-5d/wk     Strategies: Lose it  "radha (smaller portions; lower carb)     Social Determinants of Health     Financial Resource Strain: Patient Declined (2/22/2024)    Overall Financial Resource Strain (CARDIA)     Difficulty of Paying Living Expenses: Patient declined   Food Insecurity: No Food Insecurity (2/22/2024)    Hunger Vital Sign     Worried About Running Out of Food in the Last Year: Never true     Ran Out of Food in the Last Year: Never true   Transportation Needs: No Transportation Needs (2/22/2024)    PRAPARE - Transportation     Lack of Transportation (Medical): No     Lack of Transportation (Non-Medical): No   Physical Activity: Unknown (2/22/2024)    Exercise Vital Sign     Days of Exercise per Week: 7 days   Stress: No Stress Concern Present (2/22/2024)    [employer] - Occupational Stress Questionnaire     Feeling of Stress : Not at all   Social Connections: Unknown (2/22/2024)    Social Connection and Isolation Panel [NHANES]     Frequency of Communication with Friends and Family: Patient declined     Frequency of Social Gatherings with Friends and Family: Once a week     Active Member of Clubs or Organizations: No     Attends Club or Organization Meetings: Never     Marital Status:    Housing Stability: Low Risk  (2/22/2024)    Housing Stability Vital Sign     Unable to Pay for Housing in the Last Year: No     Number of Places Lived in the Last Year: 2     Unstable Housing in the Last Year: No     Review of patient's allergies indicates:   Allergen Reactions    Paraffin     Pcn [penicillins] Itching     Injection only.  Can tolerate oral form    Prednisone      Other reaction(s): increased heart rate       Objective:       BP (!) 138/92 (BP Location: Right arm, Patient Position: Sitting, BP Method: Medium (Manual))   Pulse 76   Temp 96.8 °F (36 °C) (Tympanic)   Resp 20   Ht 5' 4" (1.626 m)   Wt 70.8 kg (156 lb 1.4 oz)   LMP 05/30/2021   SpO2 98%   BMI 26.79 kg/m²   Physical " Exam  Constitutional:       General: She is not in acute distress.     Appearance: Normal appearance. She is well-developed. She is not ill-appearing or diaphoretic.   Neurological:      Mental Status: She is alert and oriented to person, place, and time.   Psychiatric:         Mood and Affect: Mood is depressed. Affect is tearful.         Speech: Speech normal.         Behavior: Behavior normal.         Thought Content: Thought content normal. Thought content is not paranoid or delusional. Thought content does not include homicidal or suicidal ideation.         Cognition and Memory: Cognition normal.         Judgment: Judgment normal.       Assessment:     1. Adjustment disorder with depressed mood    2. Routine adult health maintenance    3. Hyperglycemia      Plan:   Adjustment disorder with depressed mood  -     T4, Free; Future; Expected date: 07/05/2024  -     TSH; Future; Expected date: 04/05/2024  -     FOLLICLE STIMULATING HORMONE; Future; Expected date: 04/05/2024  -     LUTEINIZING HORMONE; Future; Expected date: 04/05/2024  -     ESTROGENS, TOTAL; Future; Expected date: 04/05/2024  -     PROGESTERONE; Future; Expected date: 04/05/2024    Routine adult health maintenance  -     T4, Free; Future; Expected date: 07/05/2024  -     TSH; Future; Expected date: 04/05/2024  -     FOLLICLE STIMULATING HORMONE; Future; Expected date: 04/05/2024  -     LUTEINIZING HORMONE; Future; Expected date: 04/05/2024  -     ESTROGENS, TOTAL; Future; Expected date: 04/05/2024  -     PROGESTERONE; Future; Expected date: 04/05/2024    Hyperglycemia    Other orders  -     buPROPion (WELLBUTRIN XL) 150 MG TB24 tablet; Take 1 tablet (150 mg total) by mouth once daily.  Dispense: 30 tablet; Refill: 11    Will add Wellbutrin to her regimen  Will check above labs in addition to CMP, A1c previously ordered  Medication List with Changes/Refills   New Medications    BUPROPION (WELLBUTRIN XL) 150 MG TB24 TABLET    Take 1 tablet (150 mg  total) by mouth once daily.   Current Medications    AZELASTINE (ASTELIN) 137 MCG (0.1 %) NASAL SPRAY    1 spray (137 mcg total) by Nasal route 2 (two) times daily.    DEXTROAMPHETAMINE-AMPHETAMINE (ADDERALL XR) 25 MG 24 HR CAPSULE    Take 25 mg by mouth every morning.    DEXTROAMPHETAMINE-AMPHETAMINE (ADDERALL XR) 30 MG 24 HR CAPSULE    Take by mouth every morning.    EPINEPHRINE (EPIPEN) 0.3 MG/0.3 ML ATIN    Inject 0.3 mLs (0.3 mg total) into the muscle once. for 1 dose    FLUTICASONE PROPIONATE (FLONASE) 50 MCG/ACTUATION NASAL SPRAY    USE 1 SPRAY (50 MCG TOTAL) IN EACH NOSTRIL ONCE DAILY    HYDROCORTISONE 2.5 % LOTION    Apply topically 2 (two) times daily.    MONTELUKAST (SINGULAIR) 10 MG TABLET    TAKE 1 TABLET BY MOUTH EVERY DAY IN THE EVENING    TRIAMCINOLONE ACETONIDE 0.1% (KENALOG) 0.1 % OINTMENT    Apply topically 2 (two) times daily.

## 2024-04-08 RX ORDER — EPINEPHRINE 0.3 MG/.3ML
1 INJECTION SUBCUTANEOUS ONCE
Qty: 2 EACH | Refills: 11 | Status: SHIPPED | OUTPATIENT
Start: 2024-04-08 | End: 2024-04-08

## 2024-04-11 ENCOUNTER — PATIENT MESSAGE (OUTPATIENT)
Dept: INTERNAL MEDICINE | Facility: CLINIC | Age: 44
End: 2024-04-11
Payer: COMMERCIAL

## 2024-04-15 NOTE — TELEPHONE ENCOUNTER
Can we schedule a day to perform skin prick and intradermal testing to [facility]. We will need pharmacy on board to have this done, as we will need dilutions.

## 2024-04-18 NOTE — TELEPHONE ENCOUNTER
Can do it on a Tuesday, no later than 9 am. Please let me know, if the date is changed, as I have to send and order to pharmacy to make dilutions.

## 2024-04-26 ENCOUNTER — NURSE TRIAGE (OUTPATIENT)
Dept: ADMINISTRATIVE | Facility: CLINIC | Age: 44
End: 2024-04-26
Payer: COMMERCIAL

## 2024-04-26 NOTE — TELEPHONE ENCOUNTER
Pt calling with c/o sore throat and ear ache and she said that its her worst allergy time and it started yesterday and its gotten worse over night. Pt triaged and care advice to be seen in office today and no appts available and told UC or [facility] and she will do UC after work this afternoon. Pt told to call back if any fever pr difficulty swallowing or worsening symptoms                  Reason for Disposition   SEVERE sore throat pain    Additional Information   Negative: SEVERE difficulty breathing (e.g., struggling for each breath, speaks in single words)   Negative: Sounds like a life-threatening emergency to the triager   Negative: Drooling or spitting out saliva (because can't swallow)   Negative: Unable to open mouth completely   Negative: Drinking very little and has signs of dehydration (e.g., no urine > 12 hours, very dry mouth, very lightheaded)   Negative: Patient sounds very sick or weak to the triager   Negative: Difficulty breathing (per caller) but not severe   Negative: Fever > 103 F (39.4 C)   Negative: Refuses to drink anything for > 12 hours    Protocols used: Sore Throat-A-OH

## 2024-04-27 ENCOUNTER — OFFICE VISIT (OUTPATIENT)
Dept: URGENT CARE | Facility: CLINIC | Age: 44
End: 2024-04-27
Payer: COMMERCIAL

## 2024-04-27 VITALS
WEIGHT: 153 LBS | RESPIRATION RATE: 18 BRPM | HEIGHT: 65 IN | DIASTOLIC BLOOD PRESSURE: 58 MMHG | HEART RATE: 89 BPM | OXYGEN SATURATION: 98 % | SYSTOLIC BLOOD PRESSURE: 121 MMHG | TEMPERATURE: 98 F | BODY MASS INDEX: 25.49 KG/M2

## 2024-04-27 DIAGNOSIS — R05.9 COUGH, UNSPECIFIED TYPE: ICD-10-CM

## 2024-04-27 DIAGNOSIS — H72.93 PERFORATED TYMPANIC MEMBRANE, BILATERAL: ICD-10-CM

## 2024-04-27 DIAGNOSIS — J34.89 SINUS PRESSURE: ICD-10-CM

## 2024-04-27 DIAGNOSIS — J02.9 SORE THROAT: ICD-10-CM

## 2024-04-27 DIAGNOSIS — J30.89 ENVIRONMENTAL AND SEASONAL ALLERGIES: Primary | ICD-10-CM

## 2024-04-27 DIAGNOSIS — Z96.21 COCHLEAR IMPLANT IN PLACE: ICD-10-CM

## 2024-04-27 DIAGNOSIS — R09.82 POST-NASAL DRIP: ICD-10-CM

## 2024-04-27 LAB
CTP QC/QA: YES
CTP QC/QA: YES
MOLECULAR STREP A: NEGATIVE
SARS-COV-2 AG RESP QL IA.RAPID: NEGATIVE

## 2024-04-27 PROCEDURE — 87811 SARS-COV-2 COVID19 W/OPTIC: CPT | Mod: QW,S$GLB,, | Performed by: NURSE PRACTITIONER

## 2024-04-27 PROCEDURE — 99214 OFFICE O/P EST MOD 30 MIN: CPT | Mod: S$GLB,,, | Performed by: NURSE PRACTITIONER

## 2024-04-27 PROCEDURE — 87651 STREP A DNA AMP PROBE: CPT | Mod: QW,S$GLB,, | Performed by: NURSE PRACTITIONER

## 2024-04-27 NOTE — PATIENT INSTRUCTIONS
Rest  Hydration/increase fluids  Steam/hot showers  Humidifier  Warm salt water gargles  Warm tea with lemon and honey  Chloraseptic spray/Cepacol Lozenge OTC as directed for sore throat  OTC antihistamines as directed  Continue nasal steroid as directed  Typical course and symptoms of illness discussed  Signs and symptoms of worsening discussed  Follow up as needed/with worsening

## 2024-04-27 NOTE — PROGRESS NOTES
"Subjective:      Patient ID: Duane Romero is a 43 y.o. female.    Vitals:  height is 5' 4.61" (1.641 m) and weight is 69.4 kg (153 lb). Her temperature is 98.4 °F (36.9 °C). Her blood pressure is 121/58 (abnormal) and her pulse is 89. Her respiration is 18 and oxygen saturation is 98%.     Chief Complaint: Sinus Problem    43 year old female presents for evaluation of sore throat, post nasal drip, and sinus pressure x 2 days. Symptom onset Thursday. Reports history of seasonal/environmental allergies: pollen, Ligustrum, grass, hay, dust, weeds. Notified PCP and was instructed to present to  for Strep test. History of Cochlear implant. Nasal steroid with some relief. No known sick contacts, employed as Pre-K teacher. Followed by ENT and Allergist.    Sinus Problem  This is a new problem. The current episode started in the past 7 days. The problem is unchanged. There has been no fever. Her pain is at a severity of 0/10. She is experiencing no pain. Associated symptoms include coughing, ear pain (bilateral ear pressure), headaches (chronic sinus headaches), sinus pressure, sneezing (chronic) and a sore throat. Pertinent negatives include no chills, congestion, diaphoresis, hoarse voice, neck pain, shortness of breath or swollen glands. Past treatments include nothing. The treatment provided no relief.       Constitution: Negative. Negative for appetite change, chills, sweating, fatigue and fever.   HENT:  Positive for ear pain (bilateral ear pressure), postnasal drip, sinus pressure and sore throat. Negative for congestion.    Neck: neck negative. Negative for neck pain.   Cardiovascular: Negative.    Eyes: Negative.    Respiratory:  Positive for cough. Negative for shortness of breath.    Gastrointestinal: Negative.  Negative for nausea, vomiting and diarrhea.   Endocrine: negative.   Genitourinary: Negative.    Musculoskeletal: Negative.  Negative for muscle ache.   Skin: Negative.  "   Allergic/Immunologic: Positive for environmental allergies, seasonal allergies and sneezing (chronic).   Neurological:  Positive for headaches (chronic sinus headaches). Negative for history of migraines.   Hematologic/Lymphatic: Negative.    Psychiatric/Behavioral: Negative.        Objective:     Physical Exam   Constitutional: She is oriented to person, place, and time. She appears well-developed. She is cooperative.  Non-toxic appearance. She does not appear ill. No distress. normalawake  HENT:   Head: Normocephalic and atraumatic.   Ears:   Right Ear: Hearing, external ear and ear canal normal. Tympanic membrane is perforated. Tympanic membrane is not injected, not scarred, not erythematous, not retracted and not bulging. no impacted cerumen  Left Ear: Hearing, external ear and ear canal normal. Tympanic membrane is perforated. Tympanic membrane is not injected, not scarred, not erythematous, not retracted and not bulging. no impacted cerumen  Nose: Rhinorrhea present. No mucosal edema, nasal deformity or congestion. No epistaxis. Right sinus exhibits no maxillary sinus tenderness and no frontal sinus tenderness. Left sinus exhibits no maxillary sinus tenderness and no frontal sinus tenderness.   Mouth/Throat: Uvula is midline, oropharynx is clear and moist and mucous membranes are normal. Mucous membranes are moist. No trismus in the jaw. Normal dentition. No uvula swelling. Cobblestoning present. No oropharyngeal exudate, posterior oropharyngeal edema or posterior oropharyngeal erythema. Tonsils are 0 on the right. Tonsils are 0 on the left. No tonsillar exudate.   Bilateral cochlear implants      Comments: Bilateral cochlear implants  Eyes: Conjunctivae and lids are normal. Pupils are equal, round, and reactive to light. Right eye exhibits no discharge. Left eye exhibits no discharge. No scleral icterus. Extraocular movement intact   Neck: Trachea normal and phonation normal. Neck supple. No edema present.  No erythema present. No neck rigidity present.   Cardiovascular: Normal rate, regular rhythm, normal heart sounds and normal pulses.   Pulmonary/Chest: Effort normal and breath sounds normal. No stridor. No respiratory distress. She has no decreased breath sounds. She has no wheezes. She has no rhonchi. She has no rales. She exhibits no tenderness.   Abdominal: Normal appearance.   Musculoskeletal: Normal range of motion.         General: No deformity. Normal range of motion.   Neurological: no focal deficit. She is alert and oriented to person, place, and time. She exhibits normal muscle tone. Coordination normal.   Skin: Skin is warm, dry, intact, not diaphoretic and not pale.   Psychiatric: Her speech is normal and behavior is normal. Mood, judgment and thought content normal.   Nursing note and vitals reviewed.    Results for orders placed or performed in visit on 04/27/24   SARS Coronavirus 2 Antigen, POCT Manual Read   Result Value Ref Range    SARS Coronavirus 2 Antigen Negative Negative     Acceptable Yes    POCT Strep A, Molecular   Result Value Ref Range    Molecular Strep A, POC Negative Negative     Acceptable Yes        Assessment:     1. Environmental and seasonal allergies    2. Sore throat    3. Cough, unspecified type    4. Sinus pressure    5. Post-nasal drip    6. Perforated tympanic membrane, bilateral    7. Cochlear implant in place        Plan:       Environmental and seasonal allergies    Sore throat  -     POCT Strep A, Molecular    Cough, unspecified type  -     SARS Coronavirus 2 Antigen, POCT Manual Read    Sinus pressure    Post-nasal drip    Perforated tympanic membrane, bilateral    Cochlear implant in place           Patient presents with symptoms that are consistent with seasonal allergy exacerbation. Decision to perform Covid and Strep swabs to rule out infection, (-) results discussed. Exam negative for otitis media/bacterial sinusitis/bacterial  tonsillitis/bronchitis/pneumonia. Plan is to manage symptoms, prevent worsening, and follow up as needed/with worsening. Discussed with patient who verbalizes understanding.        Patient Instructions   Rest  Hydration/increase fluids  Steam/hot showers  Humidifier  Warm salt water gargles  Warm tea with lemon and honey  Chloraseptic spray/Cepacol Lozenge OTC as directed for sore throat  OTC antihistamines as directed  Continue nasal steroid as directed  Typical course and symptoms of illness discussed  Signs and symptoms of worsening discussed  Follow up as needed/with worsening

## 2024-05-07 ENCOUNTER — TELEPHONE (OUTPATIENT)
Dept: ALLERGY | Facility: CLINIC | Age: 44
End: 2024-05-07
Payer: COMMERCIAL

## 2024-05-07 RX ORDER — EPINEPHRINE 0.3 MG/.3ML
1 INJECTION SUBCUTANEOUS ONCE
Qty: 0.3 ML | Refills: 0 | Status: SHIPPED | OUTPATIENT
Start: 2024-05-07 | End: 2024-05-07

## 2024-05-07 NOTE — TELEPHONE ENCOUNTER
.would you send in Rx to [facility] for Auvi Q.Patient remembers she got it cheaper last time received. A regular epi pen was going to cost her $70.Please advise.  She is 's patient.

## 2024-05-08 ENCOUNTER — TELEPHONE (OUTPATIENT)
Dept: ALLERGY | Facility: CLINIC | Age: 44
End: 2024-05-08
Payer: COMMERCIAL

## 2024-05-08 NOTE — TELEPHONE ENCOUNTER
----- Message from Madeline Persaud MD sent at 5/8/2024  9:18 AM CDT -----  Please call the pharmacy and give a verbal order for the generic.  thanks  ----- Message -----  From: Kristina Quintero MA  Sent: 5/7/2024   4:58 PM CDT  To: Madeline Persaud MD      ----- Message -----  From: Briseyda Burton  Sent: 5/7/2024   4:46 PM CDT  To: [facility] Staff    Name of Who is Calling:  Pt called        What is the request in detail:    The opt would like to know if they have a alternative Epipen she can have that may be cheaper . Please advise    Can the clinic reply by [facility]:  no        What [facility] Telephone Information:  Mobile          [phone]

## 2024-05-22 ENCOUNTER — TELEPHONE (OUTPATIENT)
Dept: ALLERGY | Facility: CLINIC | Age: 44
End: 2024-05-22
Payer: COMMERCIAL

## 2024-05-22 NOTE — TELEPHONE ENCOUNTER
Called patient to reschedule her patch test on 6/17 being that Dr. Persaud will not be in clinic.  Discuss options of either reschedule at the [facility] or the option to come 6/18 at [facility] wit Dr. Esteves followed by Thursday and Friday here at [facility], to keep her testing on track and then continue treatment with Dr. Persaud.    No answer, left voice mail to return call.

## 2024-05-24 ENCOUNTER — PATIENT MESSAGE (OUTPATIENT)
Dept: OTOLARYNGOLOGY | Facility: CLINIC | Age: 44
End: 2024-05-24
Payer: COMMERCIAL

## 2024-05-24 ENCOUNTER — PATIENT MESSAGE (OUTPATIENT)
Dept: ALLERGY | Facility: CLINIC | Age: 44
End: 2024-05-24
Payer: COMMERCIAL

## 2024-05-27 PROBLEM — Z51.6 ALLERGY DESENSITIZATION THERAPY: Status: ACTIVE | Noted: 2024-05-27

## 2024-05-28 ENCOUNTER — OFFICE VISIT (OUTPATIENT)
Dept: ALLERGY | Facility: CLINIC | Age: 44
End: 2024-05-28
Payer: COMMERCIAL

## 2024-05-28 ENCOUNTER — PATIENT MESSAGE (OUTPATIENT)
Dept: ALLERGY | Facility: CLINIC | Age: 44
End: 2024-05-28

## 2024-05-28 VITALS
DIASTOLIC BLOOD PRESSURE: 79 MMHG | TEMPERATURE: 98 F | HEART RATE: 72 BPM | WEIGHT: 157.44 LBS | BODY MASS INDEX: 26.51 KG/M2 | SYSTOLIC BLOOD PRESSURE: 126 MMHG

## 2024-05-28 DIAGNOSIS — Z88.9 DRUG ALLERGY: Primary | ICD-10-CM

## 2024-05-28 DIAGNOSIS — Z01.82 ENCOUNTER FOR ALLERGY TESTING: ICD-10-CM

## 2024-05-28 PROCEDURE — 3044F HG A1C LEVEL LT 7.0%: CPT | Mod: CPTII,S$GLB,, | Performed by: ALLERGY & IMMUNOLOGY

## 2024-05-28 PROCEDURE — 1159F MED LIST DOCD IN RCRD: CPT | Mod: CPTII,S$GLB,, | Performed by: ALLERGY & IMMUNOLOGY

## 2024-05-28 PROCEDURE — 99999 PR PBB SHADOW E&M-EST. PATIENT-LVL III: CPT | Mod: PBBFAC,,, | Performed by: ALLERGY & IMMUNOLOGY

## 2024-05-28 PROCEDURE — 3078F DIAST BP <80 MM HG: CPT | Mod: CPTII,S$GLB,, | Performed by: ALLERGY & IMMUNOLOGY

## 2024-05-28 PROCEDURE — 95018 ALL TSTG PERQ&IQ DRUGS/BIOL: CPT | Mod: S$GLB,,, | Performed by: ALLERGY & IMMUNOLOGY

## 2024-05-28 PROCEDURE — 3008F BODY MASS INDEX DOCD: CPT | Mod: CPTII,S$GLB,, | Performed by: ALLERGY & IMMUNOLOGY

## 2024-05-28 PROCEDURE — 99215 OFFICE O/P EST HI 40 MIN: CPT | Mod: 25,S$GLB,, | Performed by: ALLERGY & IMMUNOLOGY

## 2024-05-28 PROCEDURE — 3074F SYST BP LT 130 MM HG: CPT | Mod: CPTII,S$GLB,, | Performed by: ALLERGY & IMMUNOLOGY

## 2024-05-28 NOTE — PROGRESS NOTES
"Subjective:      Patient ID: Duane Romero is a 43 y.o. female.    Chief Complaint:  Allergy Testing    HPI 5/28/2024: 43 year old female here for allergy testing to celestone          Last seen 5/31/2022  HPI: 43 year old female last seen in 2022- skin prick testing was significant to dust mites and pollen(grass). She was started on allergy immunotherapy and only took one injection. Due to work schedule, unable to take allergy immunotherapy.      Penicillin- "altered breathing and heart rate" over five years    She is interested in PCN allergy testing.    Prednisone- "my breathing gets weird and my heart rate goes up." She did not seek medical attention for symptoms.  She wants steroids for carpel tunnel and other joint concerns.    She reports atopic dermatitis.  Eyelids and ear lobes- started 1 year ago.            Past Medical History:   Diagnosis Date    Allergy     Asthma     Hyperlipidemia     Otitis media     Prediabetes     Sinusitis       Family History   Problem Relation Name Age of Onset    Hyperlipidemia Mother Mom     Diabetes Father Father         DM2 insulin pump & oral    Hypertension Father Father     Obesity Father Father     Breast cancer Maternal Aunt Aunt     Cancer Maternal Aunt Aunt         Skin cancer    Thyroid disease Maternal Grandmother Rima alcon     Migraines Neg Hx      Asthma Neg Hx            Current Outpatient Medications on File Prior to Visit   Medication Sig Dispense Refill    azelastine (ASTELIN) 137 mcg (0.1 %) nasal spray 1 spray (137 mcg total) by Nasal route 2 (two) times daily. 30 mL 2    buPROPion (WELLBUTRIN XL) 150 MG TB24 tablet Take 1 tablet (150 mg total) by mouth once daily. 30 tablet 11    dextroamphetamine-amphetamine (ADDERALL XR) 25 MG 24 hr capsule Take 25 mg by mouth every morning.      dextroamphetamine-amphetamine (ADDERALL XR) 30 MG 24 hr capsule Take by mouth every morning.      fluticasone propionate (FLONASE) 50 mcg/actuation nasal spray " USE 1 SPRAY (50 MCG TOTAL) IN EACH NOSTRIL ONCE DAILY 48 mL 1    hydrocortisone 2.5 % lotion Apply topically 2 (two) times daily. 59 mL 0    montelukast (SINGULAIR) 10 mg tablet TAKE 1 TABLET BY MOUTH EVERY DAY IN THE EVENING 90 tablet 1    triamcinolone acetonide 0.1% (KENALOG) 0.1 % ointment Apply topically 2 (two) times daily. 30 g 1    EPINEPHrine (AUVI-Q) 0.3 mg/0.3 mL AtIn Inject 0.3 mLs (0.3 mg total) into the muscle once. for 1 dose 0.3 mL 0     Current Facility-Administered Medications on File Prior to Visit   Medication Dose Route Frequency Provider Last Rate Last Admin    betamethasone acetate-betamethasone sodium phosphate (Celestone) 0.06 mg in sodium chloride 0.9% (1:100) 1 mL syringe   Subcutaneous Once Madeline Persaud MD        betamethasone acetate-betamethasone sodium phosphate (Celestone) 0.6 mg in sodium chloride 0.9% (1:10) 1 mL syringe   Subcutaneous Once Madeline Persaud MD        betamethasone acetate-betamethasone sodium phosphate (Celestone) 6 mg in sodium chloride 0.9% (1:1) 1 mL syringe   Subcutaneous Once Madeline Persaud MD            Review of patient's allergies indicates:   Allergen Reactions    Paraffin     Pcn [penicillins] Itching     Injection only.  Can tolerate oral form    Prednisone      Other reaction(s): increased heart rate        Review of Systems   Constitutional:  Negative for chills and fever.   HENT:  Negative for congestion and rhinorrhea.    Eyes:  Positive for itching. Negative for discharge.   Respiratory:  Negative for cough, shortness of breath and wheezing.    Skin:  Positive for rash.   Allergic/Immunologic: Positive for environmental allergies. Negative for food allergies.   Neurological:  Negative for facial asymmetry and speech difficulty.   Psychiatric/Behavioral:  Negative for behavioral problems and suicidal ideas.        Objective:   Physical Exam  Vitals reviewed.   Constitutional:       General: She is not in acute distress.     Appearance: Normal appearance.  She is well-developed. She is not ill-appearing, toxic-appearing or diaphoretic.   HENT:      Head: Normocephalic and atraumatic.      Right Ear: Tympanic membrane, ear canal and external ear normal. There is no impacted cerumen.      Left Ear: Tympanic membrane, ear canal and external ear normal. There is no impacted cerumen.      Nose: Nose normal. No congestion or rhinorrhea.      Mouth/Throat:      Pharynx: No oropharyngeal exudate or posterior oropharyngeal erythema.   Eyes:      General: No scleral icterus.        Right eye: No discharge.         Left eye: No discharge.      Pupils: Pupils are equal, round, and reactive to light.   Neck:      Thyroid: No thyromegaly.   Cardiovascular:      Rate and Rhythm: Normal rate and regular rhythm.      Heart sounds: Normal heart sounds. No murmur heard.     No friction rub. No gallop.   Pulmonary:      Effort: Pulmonary effort is normal. No respiratory distress.      Breath sounds: Normal breath sounds. No stridor. No wheezing, rhonchi or rales.   Chest:      Chest wall: No tenderness.   Musculoskeletal:         General: Normal range of motion.      Cervical back: Normal range of motion and neck supple. No rigidity or tenderness. No muscular tenderness.   Lymphadenopathy:      Cervical: No cervical adenopathy.   Skin:     General: Skin is warm.      Coloration: Skin is not jaundiced or pale.      Findings: No bruising or erythema.   Neurological:      General: No focal deficit present.      Mental Status: She is alert and oriented to person, place, and time.      Gait: Gait normal.   Psychiatric:         Mood and Affect: Mood normal.         Behavior: Behavior normal.         Thought Content: Thought content normal.         Judgment: Judgment normal.       Celestone - skin prick  Histamine 3 plus  Saline- 0  1:1 dilution- 0  1:10 dilution- 0  1:100 dilution- 0    Intradermal   Saline-0  1:1 dilution- 3 plus  1:10- dilution  1:100-dilution      She had an appropriate  response to positive and negative controls.  Skin prick tests were negative.  1:1 dilution was significant for the intradermal- raised, erythematous and pruritic.    Assessment:      1. Drug allergy    2. Encounter for allergy testing          Plan:     Drug allergy    Encounter for allergy testing      Allergy testing significant for celestone today. Recommend avoidance of celestone.     Will schedule for patch testing and PCN allergy testing  Encouraged her to ask her PCP what steroid he/she would like to use.  Advised that symptoms associated with prednisone are not an IgE mediated allergy, but typical side effects associated with this medication.    Recommend she avoid PCN and medications in this class.      RTC 2-4 weeks        BRYCE PARADA spent a total of  70 minutes on the day of the visit.This includes face to face time and non-face to face time preparing to see the patient (eg, review of tests), obtaining and/or reviewing separately obtained history, documenting clinical information in the electronic or other health record, independently interpreting results and communicating results to the patient/family/caregiver, or care coordinator.

## 2024-05-28 NOTE — PROGRESS NOTES
Celestone Allergy Test preformed in clinic today.    See documentation in results and flowsheet from the encounter for details.

## 2024-06-12 ENCOUNTER — PATIENT MESSAGE (OUTPATIENT)
Dept: OTOLARYNGOLOGY | Facility: CLINIC | Age: 44
End: 2024-06-12
Payer: COMMERCIAL

## 2024-06-14 ENCOUNTER — TELEPHONE (OUTPATIENT)
Dept: ALLERGY | Facility: CLINIC | Age: 44
End: 2024-06-14
Payer: COMMERCIAL

## 2024-06-14 NOTE — TELEPHONE ENCOUNTER
----- Message from Kallie Shipman sent at 6/14/2024 11:17 AM CDT -----  Contact: self  [phone]  Patient needs call back. It's regarding procedure dates 6/17 and 6/19. Please call to advise

## 2024-07-05 ENCOUNTER — OFFICE VISIT (OUTPATIENT)
Dept: ORTHOPEDICS | Facility: CLINIC | Age: 44
End: 2024-07-05
Payer: COMMERCIAL

## 2024-07-05 VITALS — HEIGHT: 65 IN | BODY MASS INDEX: 26.23 KG/M2 | WEIGHT: 157.44 LBS

## 2024-07-05 DIAGNOSIS — G56.03 BILATERAL CARPAL TUNNEL SYNDROME: Primary | ICD-10-CM

## 2024-07-05 PROCEDURE — 99999 PR PBB SHADOW E&M-EST. PATIENT-LVL III: CPT | Mod: PBBFAC,,, | Performed by: ORTHOPAEDIC SURGERY

## 2024-07-05 NOTE — PROGRESS NOTES
Subjective:     Patient ID: Duane Romero is a 43 y.o. female.    Chief Complaint: Pain and Numbness of the Right Hand and Pain and Numbness of the Left Hand      HPI:  The patient is a 43-year-old female with bilateral carpal tunnel syndrome documented by nerve conduction studies by Dr. Heard dated 2024.  She wishes to try injection but is allergic to Celestone.  She did not have anaphylactic reaction but did have some difficulty breathing.  She may wish to try Depo-Medrol and be observed closely.  She wishes to delay surgery until next year.  She says she has an EpiPen and can bring that just in case  Past Medical History:   Diagnosis Date    Allergy     Asthma     Hyperlipidemia     Otitis media     Prediabetes     Sinusitis      Past Surgical History:   Procedure Laterality Date    ADENOIDECTOMY       SECTION      CYST REMOVAL Right     Under right arm    DILATION AND CURETTAGE OF UTERUS      HERNIA REPAIR  2022    HYSTERECTOMY  2021    FRANCO/BS    IMPLANTATION OF BONE ANCHORED HEARING AID (BAHA) Bilateral 2022    Procedure: INSERTION, BAHA;  Surgeon: Jeff Kelley MD;  Location: [facility] OR;  Service: ENT;  Laterality: Bilateral;    TONSILLECTOMY      TUBAL LIGATION      TYMPANOPLASTY      TYMPANOSTOMY TUBE PLACEMENT      UMBILICAL HERNIA REPAIR N/A 2022    Procedure: REPAIR, HERNIA, UMBILICAL, AGE 5 YEARS OR OLDER;  Surgeon: Philipp Paiz MD;  Location: [facility] OR;  Service: General;  Laterality: N/A;     Family History   Problem Relation Name Age of Onset    Hyperlipidemia Mother Mom     Diabetes Father Father         DM2 insulin pump & oral    Hypertension Father Father     Obesity Father Father     Breast cancer Maternal Aunt Aunt     Cancer Maternal Aunt Aunt         Skin cancer    Thyroid disease Maternal Grandmother Rima alcon     Migraines Neg Hx      Asthma Neg Hx       Social History     Socioeconomic History    Marital status:    Tobacco Use     Smoking status: Never    Smokeless tobacco: Never   Substance and Sexual Activity    Alcohol use: Not Currently     Comment: Occas. last drink on 06/22/2022    Drug use: Never    Sexual activity: Not Currently     Partners: Male     Birth control/protection: None     Comment: I had a partial hysterectomy 5-   Social History Narrative    Patient goal(s): Weight of 130 lbs    Motivation: Stay healthy (prevent dz/need for rx) to enjoy outdoor activities with family or by herself    Breakfast: 1-2 sausages & 1-2 eggs with 1 slice bread; water & coffee w/ Stevia & sugar-free creamer    Lunch: Boneless chicken wings & 1 thin slice of cheesecake or 1 turkey sandwich on white bread, 1 slice of American cheese; water    Dinner: Hamburger (90:10 or 80:20 meat) with cheese & bread or chicken; water or wine 5.5 oz    Snacks: Apples or 1-2 small bag of chips daily (160 jamaal)    Eating out: 3-4x/wk    Water (oz/day): 102 oz/d    Physical Activity: 35-50 min/d of resistance/aerobic & 4-5d/wk     Strategies: Lose it radha (smaller portions; lower carb)     Social Determinants of Health     Financial Resource Strain: Patient Declined (2/22/2024)    Overall Financial Resource Strain (CARDIA)     Difficulty of Paying Living Expenses: Patient declined   Food Insecurity: No Food Insecurity (2/22/2024)    Hunger Vital Sign     Worried About Running Out of Food in the Last Year: Never true     Ran Out of Food in the Last Year: Never true   Transportation Needs: No Transportation Needs (2/22/2024)    PRAPARE - Transportation     Lack of Transportation (Medical): No     Lack of Transportation (Non-Medical): No   Physical Activity: Unknown (2/22/2024)    Exercise Vital Sign     Days of Exercise per Week: 7 days   Stress: No Stress Concern Present (2/22/2024)    [employer] - Occupational Stress Questionnaire     Feeling of Stress : Not at all   Housing Stability: Low Risk  (2/22/2024)    Housing Stability  Vital Sign     Unable to Pay for Housing in the Last Year: No     Number of Places Lived in the Last Year: 2     Unstable Housing in the Last Year: No     Medication List with Changes/Refills   Current Medications    AZELASTINE (ASTELIN) 137 MCG (0.1 %) NASAL SPRAY    1 spray (137 mcg total) by Nasal route 2 (two) times daily.    BUPROPION (WELLBUTRIN XL) 150 MG TB24 TABLET    Take 1 tablet (150 mg total) by mouth once daily.    DEXTROAMPHETAMINE-AMPHETAMINE (ADDERALL XR) 25 MG 24 HR CAPSULE    Take 25 mg by mouth every morning.    DEXTROAMPHETAMINE-AMPHETAMINE (ADDERALL XR) 30 MG 24 HR CAPSULE    Take by mouth every morning.    EPINEPHRINE (AUVI-Q) 0.3 MG/0.3 ML ATIN    Inject 0.3 mLs (0.3 mg total) into the muscle once. for 1 dose    FLUTICASONE PROPIONATE (FLONASE) 50 MCG/ACTUATION NASAL SPRAY    USE 1 SPRAY (50 MCG TOTAL) IN EACH NOSTRIL ONCE DAILY    HYDROCORTISONE 2.5 % LOTION    Apply topically 2 (two) times daily.    MONTELUKAST (SINGULAIR) 10 MG TABLET    TAKE 1 TABLET BY MOUTH EVERY DAY IN THE EVENING    TRIAMCINOLONE ACETONIDE 0.1% (KENALOG) 0.1 % OINTMENT    Apply topically 2 (two) times daily.     Review of patient's allergies indicates:   Allergen Reactions    Paraffin     Pcn [penicillins] Itching     Injection only.  Can tolerate oral form    Prednisone      Other reaction(s): increased heart rate     Review of Systems   Constitutional: Negative for malaise/fatigue.   HENT:  Positive for hearing loss.    Eyes:  Negative for double vision and visual disturbance.   Cardiovascular:  Positive for dyspnea on exertion and palpitations. Negative for chest pain.   Respiratory:  Negative for shortness of breath.    Endocrine: Negative for cold intolerance.   Hematologic/Lymphatic: Does not bruise/bleed easily.   Skin:  Negative for poor wound healing and suspicious lesions.   Musculoskeletal:  Negative for gout, joint pain and joint swelling.   Gastrointestinal:  Negative for nausea and vomiting.    Genitourinary:  Negative for dysuria.   Neurological:  Positive for numbness, paresthesias and sensory change.   Psychiatric/Behavioral:  Negative for depression, memory loss and substance abuse. The patient is not nervous/anxious.    Allergic/Immunologic: Negative for persistent infections.       Objective:   Body mass index is 26.51 kg/m².  There were no vitals filed for this visit.             General    Constitutional: She is oriented to person, place, and time. She appears well-developed and well-nourished. No distress.   HENT:   Head: Normocephalic.   Eyes: EOM are normal.   Pulmonary/Chest: Effort normal.   Neurological: She is oriented to person, place, and time.   Psychiatric: She has a normal mood and affect.             Right Hand/Wrist Exam     Inspection   Scars: Wrist - absent Hand -  absent  Effusion: Wrist - absent Hand -  absent    Pain   Wrist - The patient exhibits pain of the flexor/pronator group.    Tests   Phalens sign: positive  Tinel's sign (median nerve): positive  Carpal Tunnel Compression Test: positive    Atrophy   Thenar:  negative  Intrinsic:  negative    Other     Neuorologic Exam    Median Distribution: abnormal  Ulnar Distribution: normal  Radial Distribution: normal    Comments:  The patient has a positive Tinel and positive Phalen sign.  There is no thenar atrophy noted.      Left Hand/Wrist Exam     Inspection   Scars: Wrist - absent Hand -  absent  Effusion: Wrist - absent Hand -  absent    Pain   Wrist - The patient exhibits pain of the flexor/pronator group.    Tests   Phalens sign: positive  Tinel's sign (median nerve): positive  Carpal Tunnel Compression Test: positive    Atrophy  Thenar:  Negative  Intrinsic: negative    Other     Sensory Exam  Median Distribution: abnormal  Ulnar Distribution: normal  Radial Distribution: normal    Comments:  The patient has a positive Tinel and positive Phalen sign.  There is no intrinsic atrophy or thenar atrophy  noted          Vascular Exam       Capillary Refill  Right Hand: normal capillary refill  Left Hand: normal capillary refill          Relevant imaging results reviewed and interpreted by me, discussed with the patient and / or family today radiographs were not obtained today  Assessment:     Encounter Diagnosis   Name Primary?    Bilateral carpal tunnel syndrome Yes        Plan:     The patient wishes to return with her epi pin and we can give her a test dose of Depo-Medrol and wait for injection of Depo-Medrol to take effect here and be monitored for about half an hour after the injection.  She never had an anaphylactic reaction.  She wishes to delay surgery until next year                Disclaimer: This note was prepared using a voice recognition system and is likely to have sound alike errors within the text.

## 2024-07-10 ENCOUNTER — OFFICE VISIT (OUTPATIENT)
Dept: ORTHOPEDICS | Facility: CLINIC | Age: 44
End: 2024-07-10
Payer: COMMERCIAL

## 2024-07-10 VITALS — HEIGHT: 65 IN | BODY MASS INDEX: 26.23 KG/M2 | WEIGHT: 157.44 LBS

## 2024-07-10 DIAGNOSIS — G56.03 BILATERAL CARPAL TUNNEL SYNDROME: Primary | ICD-10-CM

## 2024-07-10 DIAGNOSIS — M77.12 LATERAL EPICONDYLITIS OF BOTH ELBOWS: ICD-10-CM

## 2024-07-10 DIAGNOSIS — M77.11 LATERAL EPICONDYLITIS OF BOTH ELBOWS: ICD-10-CM

## 2024-07-10 PROCEDURE — 99999 PR PBB SHADOW E&M-EST. PATIENT-LVL III: CPT | Mod: PBBFAC,,, | Performed by: ORTHOPAEDIC SURGERY

## 2024-07-10 RX ORDER — METHYLPREDNISOLONE ACETATE 40 MG/ML
40 INJECTION, SUSPENSION INTRA-ARTICULAR; INTRALESIONAL; INTRAMUSCULAR; SOFT TISSUE
Status: DISCONTINUED | OUTPATIENT
Start: 2024-07-10 | End: 2024-07-10 | Stop reason: HOSPADM

## 2024-07-10 RX ORDER — TRIAMCINOLONE ACETONIDE 40 MG/ML
40 INJECTION, SUSPENSION INTRA-ARTICULAR; INTRAMUSCULAR
Status: DISCONTINUED | OUTPATIENT
Start: 2024-07-10 | End: 2024-07-10 | Stop reason: HOSPADM

## 2024-07-10 RX ADMIN — METHYLPREDNISOLONE ACETATE 40 MG: 40 INJECTION, SUSPENSION INTRA-ARTICULAR; INTRALESIONAL; INTRAMUSCULAR; SOFT TISSUE at 01:07

## 2024-07-10 RX ADMIN — TRIAMCINOLONE ACETONIDE 40 MG: 40 INJECTION, SUSPENSION INTRA-ARTICULAR; INTRAMUSCULAR at 01:07

## 2024-07-10 NOTE — PROGRESS NOTES
Subjective:     Patient ID: Duane Romero is a 43 y.o. female.    Chief Complaint: Pain and Numbness of the Right Hand and Numbness and Pain of the Left Hand      HPI:  The patient is a 43-year-old female with bilateral elbow lateral epicondylitis and bilateral carpal tunnel syndrome who wishes to try Depo-Medrol injection.  She has allergy to Celestone.  She brought an EpiPen just in case.    Past Medical History:   Diagnosis Date    Allergy     Asthma     Hyperlipidemia     Otitis media     Prediabetes     Sinusitis      Past Surgical History:   Procedure Laterality Date    ADENOIDECTOMY       SECTION      CYST REMOVAL Right     Under right arm    DILATION AND CURETTAGE OF UTERUS      HERNIA REPAIR  2022    HYSTERECTOMY  2021    FRANCO/BS    IMPLANTATION OF BONE ANCHORED HEARING AID (BAHA) Bilateral 2022    Procedure: INSERTION, BAHA;  Surgeon: Jeff Kelley MD;  Location: [facility] OR;  Service: ENT;  Laterality: Bilateral;    TONSILLECTOMY      TUBAL LIGATION      TYMPANOPLASTY      TYMPANOSTOMY TUBE PLACEMENT      UMBILICAL HERNIA REPAIR N/A 2022    Procedure: REPAIR, HERNIA, UMBILICAL, AGE 5 YEARS OR OLDER;  Surgeon: Philipp Paiz MD;  Location: [facility] OR;  Service: General;  Laterality: N/A;     Family History   Problem Relation Name Age of Onset    Hyperlipidemia Mother Mom     Diabetes Father Father         DM2 insulin pump & oral    Hypertension Father Father     Obesity Father Father     Breast cancer Maternal Aunt Aunt     Cancer Maternal Aunt Aunt         Skin cancer    Thyroid disease Maternal Grandmother Rima alcon     Migraines Neg Hx      Asthma Neg Hx       Social History     Socioeconomic History    Marital status:    Tobacco Use    Smoking status: Never    Smokeless tobacco: Never   Substance and Sexual Activity    Alcohol use: Not Currently     Comment: Occas. last drink on 2022    Drug use: Never    Sexual activity: Not Currently     Partners:  Male     Birth control/protection: None     Comment: I had a partial hysterectomy 5-   Social History Narrative    Patient goal(s): Weight of 130 lbs    Motivation: Stay healthy (prevent dz/need for rx) to enjoy outdoor activities with family or by herself    Breakfast: 1-2 sausages & 1-2 eggs with 1 slice bread; water & coffee w/ Stevia & sugar-free creamer    Lunch: Boneless chicken wings & 1 thin slice of cheesecake or 1 turkey sandwich on white bread, 1 slice of American cheese; water    Dinner: Hamburger (90:10 or 80:20 meat) with cheese & bread or chicken; water or wine 5.5 oz    Snacks: Apples or 1-2 small bag of chips daily (160 jamaal)    Eating out: 3-4x/wk    Water (oz/day): 102 oz/d    Physical Activity: 35-50 min/d of resistance/aerobic & 4-5d/wk     Strategies: Lose it radha (smaller portions; lower carb)     Social Determinants of Health     Financial Resource Strain: Patient Declined (2/22/2024)    Overall Financial Resource Strain (CARDIA)     Difficulty of Paying Living Expenses: Patient declined   Food Insecurity: No Food Insecurity (2/22/2024)    Hunger Vital Sign     Worried About Running Out of Food in the Last Year: Never true     Ran Out of Food in the Last Year: Never true   Transportation Needs: No Transportation Needs (2/22/2024)    PRAPARE - Transportation     Lack of Transportation (Medical): No     Lack of Transportation (Non-Medical): No   Physical Activity: Unknown (2/22/2024)    Exercise Vital Sign     Days of Exercise per Week: 7 days   Stress: No Stress Concern Present (2/22/2024)    [employer] [place] - Occupational Stress Questionnaire     Feeling of Stress : Not at all   Housing Stability: Low Risk  (2/22/2024)    Housing Stability Vital Sign     Unable to Pay for Housing in the Last Year: No     Number of Places Lived in the Last Year: 2     Unstable Housing in the Last Year: No     Medication List with Changes/Refills   Current Medications    AZELASTINE  (ASTELIN) 137 MCG (0.1 %) NASAL SPRAY    1 spray (137 mcg total) by Nasal route 2 (two) times daily.    BUPROPION (WELLBUTRIN XL) 150 MG TB24 TABLET    Take 1 tablet (150 mg total) by mouth once daily.    DEXTROAMPHETAMINE-AMPHETAMINE (ADDERALL XR) 25 MG 24 HR CAPSULE    Take 25 mg by mouth every morning.    DEXTROAMPHETAMINE-AMPHETAMINE (ADDERALL XR) 30 MG 24 HR CAPSULE    Take by mouth every morning.    EPINEPHRINE (AUVI-Q) 0.3 MG/0.3 ML ATIN    Inject 0.3 mLs (0.3 mg total) into the muscle once. for 1 dose    FLUTICASONE PROPIONATE (FLONASE) 50 MCG/ACTUATION NASAL SPRAY    USE 1 SPRAY (50 MCG TOTAL) IN EACH NOSTRIL ONCE DAILY    HYDROCORTISONE 2.5 % LOTION    Apply topically 2 (two) times daily.    MONTELUKAST (SINGULAIR) 10 MG TABLET    TAKE 1 TABLET BY MOUTH EVERY DAY IN THE EVENING    TRIAMCINOLONE ACETONIDE 0.1% (KENALOG) 0.1 % OINTMENT    Apply topically 2 (two) times daily.     Review of patient's allergies indicates:   Allergen Reactions    Paraffin     Pcn [penicillins] Itching     Injection only.  Can tolerate oral form    Prednisone      Other reaction(s): increased heart rate     Review of Systems   Constitutional: Negative for malaise/fatigue.   HENT:  Positive for hearing loss.    Eyes:  Negative for double vision and visual disturbance.   Cardiovascular:  Positive for dyspnea on exertion and palpitations. Negative for chest pain.   Respiratory:  Negative for shortness of breath.    Endocrine: Negative for cold intolerance.   Hematologic/Lymphatic: Does not bruise/bleed easily.   Skin:  Negative for poor wound healing and suspicious lesions.   Musculoskeletal:  Negative for gout, joint pain and joint swelling.   Gastrointestinal:  Negative for nausea and vomiting.   Genitourinary:  Negative for dysuria.   Neurological:  Positive for numbness, paresthesias and sensory change.   Psychiatric/Behavioral:  Negative for depression, memory loss and substance abuse. The patient is not nervous/anxious.     Allergic/Immunologic: Negative for persistent infections.       Objective:   Body mass index is 26.51 kg/m².  There were no vitals filed for this visit.             General    Constitutional: She is oriented to person, place, and time. She appears well-developed and well-nourished. No distress.   HENT:   Head: Normocephalic.   Eyes: EOM are normal.   Pulmonary/Chest: Effort normal.   Neurological: She is oriented to person, place, and time.   Psychiatric: She has a normal mood and affect.             Right Hand/Wrist Exam     Inspection   Scars: Wrist - absent Hand -  absent  Effusion: Wrist - absent Hand -  absent    Pain   Wrist - The patient exhibits pain of the flexor/pronator group and lateral epicondyle.    Tests   Phalens sign: positive  Tinel's sign (median nerve): positive  Carpal Tunnel Compression Test: positive    Atrophy   Thenar:  negative  Intrinsic:  negative    Other     Neuorologic Exam    Median Distribution: abnormal  Ulnar Distribution: normal  Radial Distribution: normal    Comments:  The patient has a positive Tinel positive Phalen sign.  There is no thenar atrophy noted.  She has tenderness about the right elbow lateral epicondyle with pain on resisted wrist and finger extension      Left Hand/Wrist Exam     Inspection   Scars: Wrist - absent Hand -  absent  Effusion: Wrist - absent Hand -  absent    Pain   Wrist - The patient exhibits pain of the flexor/pronator group and lateral epicondyle.    Tests   Phalens sign: positive  Tinel's sign (median nerve): positive  Carpal Tunnel Compression Test: positive    Atrophy  Thenar:  Negative  Intrinsic: negative    Other     Sensory Exam  Median Distribution: abnormal  Ulnar Distribution: normal  Radial Distribution: normal    Comments:  The patient has a positive Tinel and positive Phalen sign.  There is no thenar atrophy noted.  She has tenderness about the left elbow lateral epicondyle with pain on resisted wrist and finger  extension          Vascular Exam       Capillary Refill  Right Hand: normal capillary refill  Left Hand: normal capillary refill         radiographs were not obtained today  Assessment:     Encounter Diagnoses   Name Primary?    Bilateral carpal tunnel syndrome Yes    Lateral epicondylitis of both elbows         Plan:       The patient was injected in both carpal tunnels each with 0.5 cc of 2% plain lidocaine and 0.5 cc of 80 mg Depo-Medrol and in both elbow lateral epicondyles each with 0.5 cc of 2% plain lidocaine and 0.5 cc of 80 mg Depo-Medrol.  She will wait at least 3 months between injections.              Disclaimer: This note was prepared using a voice recognition system and is likely to have sound alike errors within the text.

## 2024-07-10 NOTE — PROCEDURES
Tendon Origin: R elbow, L elbow    Date/Time: 7/10/2024 1:00 PM    Performed by: Amol Reinoso MD  Authorized by: Amol Reinoso MD    Consent Done?:  Yes (Verbal)  Timeout: prior to procedure the correct patient, procedure, and site was verified    Timeout: prior to procedure the correct patient, procedure, and site was verified    Location:  Elbow  Site:  R elbow and L elbow  Prep: patient was prepped and draped in usual sterile fashion    Ultrasonic Guidance for Needle Placement?: No    Needle size:  25 G  Approach:  Anterolateral  Medications:  40 mg triamcinolone acetonide 40 mg/mL; 40 mg methylPREDNISolone acetate 40 mg/mL; 40 mg methylPREDNISolone acetate 40 mg/mL

## 2024-07-10 NOTE — PROCEDURES
Carpal Tunnel    Date/Time: 7/10/2024 1:00 PM    Performed by: Amol Reinoso MD  Authorized by: Amol Reinoso MD    Consent Done?:  Yes (Verbal)  Indications:  Pain  Timeout: prior to procedure the correct patient, procedure, and site was verified    Prep: patient was prepped and draped in usual sterile fashion      Local anesthesia used?: Yes    Local anesthetic:  Lidocaine 2% without epinephrine  Anesthetic total (ml):  1    Location:  Wrist  Site:  R carpal tunnel and L carpal tunnel  Ultrasonic Guidance for Needle Placement?: No    Needle size:  25 G  Approach:  Volar  Medications:  40 mg methylPREDNISolone acetate 40 mg/mL; 40 mg methylPREDNISolone acetate 40 mg/mL

## 2024-07-15 ENCOUNTER — PROCEDURE VISIT (OUTPATIENT)
Dept: ALLERGY | Facility: CLINIC | Age: 44
End: 2024-07-15
Payer: COMMERCIAL

## 2024-07-15 DIAGNOSIS — L25.9 CONTACT DERMATITIS, UNSPECIFIED CONTACT DERMATITIS TYPE, UNSPECIFIED TRIGGER: Primary | ICD-10-CM

## 2024-07-15 PROCEDURE — 95044 PATCH/APPLICATION TESTS: CPT | Mod: S$GLB,,, | Performed by: ALLERGY & IMMUNOLOGY

## 2024-07-15 NOTE — PROGRESS NOTES
History of possible allergic reaction. Here today for patch test placement. Instructions have been reviewed with the patient.     Denies recent oral corticosteroid intake. No involvement of back.     T.R.U.E Test patch testing placed today (36 allergens, 3 panels). Patient given written and verbal instructions detailing to not have areas in direct contact with any water. Patient instructed to avoid hair washing until 72-96 hours. Pt instructed to avoid indoor/outdoor activities that could lead to increased sweating. Will remove patches and perform read at 48 hours as nurse's visit and then again at 72-96 hours as MD visit. Patient acknowledged understanding.

## 2024-07-17 ENCOUNTER — TELEPHONE (OUTPATIENT)
Dept: URGENT CARE | Facility: CLINIC | Age: 44
End: 2024-07-17

## 2024-07-17 ENCOUNTER — OFFICE VISIT (OUTPATIENT)
Dept: URGENT CARE | Facility: CLINIC | Age: 44
End: 2024-07-17
Payer: COMMERCIAL

## 2024-07-17 ENCOUNTER — PROCEDURE VISIT (OUTPATIENT)
Dept: ALLERGY | Facility: CLINIC | Age: 44
End: 2024-07-17
Payer: COMMERCIAL

## 2024-07-17 ENCOUNTER — HOSPITAL ENCOUNTER (OUTPATIENT)
Dept: RADIOLOGY | Facility: CLINIC | Age: 44
Discharge: HOME | End: 2024-07-17
Attending: NURSE PRACTITIONER
Payer: COMMERCIAL

## 2024-07-17 VITALS
HEART RATE: 89 BPM | DIASTOLIC BLOOD PRESSURE: 83 MMHG | HEIGHT: 65 IN | TEMPERATURE: 98 F | WEIGHT: 161.19 LBS | SYSTOLIC BLOOD PRESSURE: 129 MMHG | RESPIRATION RATE: 18 BRPM | BODY MASS INDEX: 26.85 KG/M2 | OXYGEN SATURATION: 98 %
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
## 2024-07-17 DIAGNOSIS — R07.81 RIB PAIN ON LEFT SIDE: ICD-10-CM

## 2024-07-17 DIAGNOSIS — L25.9 CONTACT DERMATITIS, UNSPECIFIED CONTACT DERMATITIS TYPE, UNSPECIFIED TRIGGER: Primary | ICD-10-CM

## 2024-07-17 DIAGNOSIS — S40.022A CONTUSION OF LEFT UPPER ARM, INITIAL ENCOUNTER: ICD-10-CM

## 2024-07-17 DIAGNOSIS — S20.212A CONTUSION OF RIB ON LEFT SIDE, INITIAL ENCOUNTER: ICD-10-CM

## 2024-07-17 PROCEDURE — 99213 OFFICE O/P EST LOW 20 MIN: CPT | Mod: S$GLB,,, | Performed by: NURSE PRACTITIONER

## 2024-07-17 NOTE — PROGRESS NOTES
Here today for 48 hour patch test read.   History of possible allergic contact dermatitis.     Denies recent oral corticosteroid intake.  Denies getting the back wet, tape removal, ect.)    T.R.U.E. Test patch testing removed today (36 allergens, 3 panels).    Patient was allowed to sit in examination for 20 minutes prior to reading.      All allergens were negative.      Patient instructed to return to clinic Friday for the final reading. Patient verbalized all understanding.

## 2024-07-17 NOTE — PATIENT INSTRUCTIONS
You have a contusion of your ribs.    To treat your pain:  600mg ibuprofen every 6 hours or tylenol 650mg every 6 hours as needed for pain. If needed, you can alternate these medications so that you take one medication every 3 hours. For instance, at noon take ibuprofen, then at 3pm take tylenol, then at 6pm take ibuprofen.    It is important to take deep breaths to avoid allowing your lungs to build fluid in the bases and result in a pneumonia. Be sure to breath deeply 5-10 times every 30 minutes. Do not apply any binding or wrap your chest with an ace wrap as this will constrain your ability to breath deeply and is not medically advised. One way to be sure your are taking deep breaths is to use an INCENTIVE SPIROMETER which can be bought over the counter.     Apply cold compresses intermittently as needed.    As pain recedes, begin normal activities slowly as tolerated.      Please arrange follow up with your primary medical clinic as soon as possible. You must understand that you've received an Urgent Care treatment only and that you may be released before all of your medical problems are known or treated. You, the patient, will arrange for follow up as instructed. If your symptoms worsen or fail to improve you should go to the Emergency Room.    WE CANNOT RULE OUT ALL POSSIBLE CAUSES OF YOUR SYMPTOMS IN THE URGENT CARE SETTING PLEASE GO TO THE ER IF YOU FEELS YOUR CONDITION IS WORSENING OR YOU WOULD LIKE EMERGENT EVALUATION

## 2024-07-17 NOTE — PROGRESS NOTES
"Subjective:      Patient ID: Duane Romero is a 43 y.o. female.    Vitals:  height is 5' 4.53" (1.639 m) and weight is 73.1 kg (161 lb 2.5 oz). Her tympanic temperature is 98.3 °F (36.8 °C). Her blood pressure is 129/83 and her pulse is 89. Her respiration is 18 and oxygen saturation is 98%.     Chief Complaint: Fall    Patient is a 43-year-old female who presents for left-sided rib pain and bruising.  She states 4 days ago she tripped and fell landing on her night stand since then she was bruising and pain.  She was treating with Tylenol.  She denies any open wounds, dyspnea, chest pain, palpitations, syncope, head wounds.  She was not anticoagulated.  No other concerns voiced.    Fall  The accident occurred 3 to 5 days ago. The fall occurred while walking. She fell from a height of 1 to 2 ft. There was no blood loss. The pain is at a severity of 8/10. Pertinent negatives include no fever, headaches, loss of consciousness, nausea, numbness or vomiting. She has tried nothing for the symptoms.       Constitution: Negative for chills and fever.   Cardiovascular:  Negative for chest pain, leg swelling and palpitations.   Gastrointestinal:  Negative for nausea and vomiting.   Musculoskeletal:  Positive for pain.   Skin:  Positive for bruising. Negative for rash, wound, abrasion, laceration and erythema.   Neurological:  Negative for coordination disturbances, loss of balance, headaches, loss of consciousness, numbness and tingling.      Objective:     Physical Exam   Constitutional: She is oriented to person, place, and time. She appears well-developed. She is cooperative.   HENT:   Head: Normocephalic and atraumatic.   Ears:   Right Ear: Hearing and external ear normal.   Left Ear: Hearing and external ear normal.   Nose: Nose normal. No mucosal edema or nasal deformity. No epistaxis. Right sinus exhibits no maxillary sinus tenderness and no frontal sinus tenderness. Left sinus exhibits no maxillary sinus " tenderness and no frontal sinus tenderness.   Mouth/Throat: Uvula is midline, oropharynx is clear and moist and mucous membranes are normal. Mucous membranes are moist. No trismus in the jaw. Normal dentition. No uvula swelling. Oropharynx is clear.   Eyes: Conjunctivae and lids are normal.   Neck: Trachea normal and phonation normal. Neck supple.   Cardiovascular: Normal rate, regular rhythm, normal heart sounds and normal pulses.   Pulmonary/Chest: Effort normal and breath sounds normal. No respiratory distress. She has no wheezes. She has no rhonchi. She has no rales. She exhibits tenderness (left lateral ribs). She exhibits no crepitus, no deformity and no swelling.   Abdominal: Normal appearance and bowel sounds are normal. Soft.   Musculoskeletal: Normal range of motion.         General: Normal range of motion.   Neurological: She is alert and oriented to person, place, and time. She exhibits normal muscle tone.   Skin: Skin is warm, dry and intact. No erythema   Psychiatric: Her speech is normal and behavior is normal. Judgment and thought content normal.   Nursing note and vitals reviewed.      Assessment:     1. Fall, initial encounter    2. Rib pain on left side    3. Contusion of rib on left side, initial encounter    4. Contusion of left upper arm, initial encounter        Plan:       Fall, initial encounter  -     XR RIB LEFT W/ PA CHEST; Future; Expected date: 07/17/2024    Rib pain on left side  -     XR RIB LEFT W/ PA CHEST; Future; Expected date: 07/17/2024    Contusion of rib on left side, initial encounter    Contusion of left upper arm, initial encounter          Medical Decision Making:   Initial Assessment:   Nontoxic appearing 44 yo female c/o rib pain.  Patient presents with pain of the right ribs . This was not the result of a traumatic injury. Based upon the history and physical examination my clinical impression is rib contusion I have low suspicion for fracture, dislocation, significant  ligamentous injury, septic arthritis, gout flare, new autoimmune arthropathy, or gonococcal arthropathy. Low suspicion for a pneumothorax, exam is negative for decreased breath sounds or adventitious lung sounds. X-ray is negative in clinic. Recommend supportive care. Rest, ice and OTC tylenol/motrin. I advised deep breathing and pulmonary exercise using an incentive spirometer.  Patient to treat pain with Tylenol/ibuprofen. Patient to follow up with PCP/ORTHO as needed. Provided with return instructions and ER precautions.    Independently Interpreted Test(s):   I have ordered and independently interpreted X-rays - see summary below.       <> Summary of X-Ray Reading(s): Ribs: no acute fracture          XR RIB LEFT W/ PA CHEST    Result Date: 7/17/2024  EXAMINATION: XR RIBS MIN 3 VIEWS W/ PA CHEST LEFT CLINICAL HISTORY: Unspecified fall, initial encounter TECHNIQUE: PA chest and four views of the left ribs COMPARISON: None FINDINGS: The cardiomediastinal silhouette is with normal limits.  The lungs are well expanded without consolidation, pleural effusion, or pneumothorax. No left rib fracture is identified.     No evidence for left rib fracture. Electronically signed by: Oumar Garcia MD Date:    07/17/2024 Time:    15:46   Patient Instructions   You have a contusion of your ribs.    To treat your pain:  600mg ibuprofen every 6 hours or tylenol 650mg every 6 hours as needed for pain. If needed, you can alternate these medications so that you take one medication every 3 hours. For instance, at noon take ibuprofen, then at 3pm take tylenol, then at 6pm take ibuprofen.    It is important to take deep breaths to avoid allowing your lungs to build fluid in the bases and result in a pneumonia. Be sure to breath deeply 5-10 times every 30 minutes. Do not apply any binding or wrap your chest with an ace wrap as this will constrain your ability to breath deeply and is not medically advised. One way to be sure your are  taking deep breaths is to use an INCENTIVE SPIROMETER which can be bought over the counter.     Apply cold compresses intermittently as needed.    As pain recedes, begin normal activities slowly as tolerated.      Please arrange follow up with your primary medical clinic as soon as possible. You must understand that you've received an Urgent Care treatment only and that you may be released before all of your medical problems are known or treated. You, the patient, will arrange for follow up as instructed. If your symptoms worsen or fail to improve you should go to the Emergency Room.    WE CANNOT RULE OUT ALL POSSIBLE CAUSES OF YOUR SYMPTOMS IN THE URGENT CARE SETTING PLEASE GO TO THE ER IF YOU FEELS YOUR CONDITION IS WORSENING OR YOU WOULD LIKE EMERGENT EVALUATION

## 2024-07-19 ENCOUNTER — OFFICE VISIT (OUTPATIENT)
Dept: ALLERGY | Facility: CLINIC | Age: 44
End: 2024-07-19
Payer: COMMERCIAL

## 2024-07-19 VITALS — HEART RATE: 67 BPM | TEMPERATURE: 98 F | SYSTOLIC BLOOD PRESSURE: 136 MMHG | DIASTOLIC BLOOD PRESSURE: 85 MMHG

## 2024-07-19 DIAGNOSIS — J30.89 ALLERGIC RHINITIS DUE TO AMERICAN HOUSE DUST MITE: ICD-10-CM

## 2024-07-19 DIAGNOSIS — Z88.0 PENICILLIN ALLERGY: ICD-10-CM

## 2024-07-19 DIAGNOSIS — L20.84 INTRINSIC ATOPIC DERMATITIS: ICD-10-CM

## 2024-07-19 DIAGNOSIS — Z88.9 DRUG ALLERGY: ICD-10-CM

## 2024-07-19 DIAGNOSIS — J30.1 SEASONAL ALLERGIC RHINITIS DUE TO POLLEN: Primary | ICD-10-CM

## 2024-07-19 PROCEDURE — 99999 PR PBB SHADOW E&M-EST. PATIENT-LVL III: CPT | Mod: PBBFAC,,, | Performed by: ALLERGY & IMMUNOLOGY

## 2024-07-19 PROCEDURE — 1159F MED LIST DOCD IN RCRD: CPT | Mod: CPTII,S$GLB,, | Performed by: ALLERGY & IMMUNOLOGY

## 2024-07-19 PROCEDURE — 3079F DIAST BP 80-89 MM HG: CPT | Mod: CPTII,S$GLB,, | Performed by: ALLERGY & IMMUNOLOGY

## 2024-07-19 PROCEDURE — 3075F SYST BP GE 130 - 139MM HG: CPT | Mod: CPTII,S$GLB,, | Performed by: ALLERGY & IMMUNOLOGY

## 2024-07-19 PROCEDURE — 99214 OFFICE O/P EST MOD 30 MIN: CPT | Mod: S$GLB,,, | Performed by: ALLERGY & IMMUNOLOGY

## 2024-07-19 PROCEDURE — 3044F HG A1C LEVEL LT 7.0%: CPT | Mod: CPTII,S$GLB,, | Performed by: ALLERGY & IMMUNOLOGY

## 2024-07-19 RX ORDER — HYDROCORTISONE 25 MG/G
OINTMENT TOPICAL 2 TIMES DAILY
Qty: 60 G | Refills: 2 | Status: SHIPPED | OUTPATIENT
Start: 2024-07-19

## 2024-07-19 NOTE — PROGRESS NOTES
"Subjective:      Patient ID: Duane Romero is a 43 y.o. female.    Chief Complaint:  Allergy Testing          HPI 7/19/2024: 43 year old female- dust mite and grass pollen allergy, celestone allergy, PCN- can have oral forms, injections cause itching- she is here for 72 hour patch test reading.    Left ear and left eyelid- atopic dermatitis      Penicillin- "altered breathing and heart rate" over five years    She is interested in PCN allergy testing.    Prednisone- "my breathing gets weird and my heart rate goes up." She did not seek medical attention for symptoms.  She wants steroids for carpel tunnel and other joint concerns.    She reports atopic dermatitis.  Eyelids and ear lobes- started 1 year ago.            Past Medical History:   Diagnosis Date    Allergy     Asthma     Hyperlipidemia     Otitis media     Prediabetes     Sinusitis       Family History   Problem Relation Name Age of Onset    Hyperlipidemia Mother Mom     Diabetes Father Father         DM2 insulin pump & oral    Hypertension Father Father     Obesity Father Father     Breast cancer Maternal Aunt Aunt     Cancer Maternal Aunt Aunt         Skin cancer    Thyroid disease Maternal Grandmother Rima rondon     Migraines Neg Hx      Asthma Neg Hx            Current Outpatient Medications on File Prior to Visit   Medication Sig Dispense Refill    azelastine (ASTELIN) 137 mcg (0.1 %) nasal spray 1 spray (137 mcg total) by Nasal route 2 (two) times daily. 30 mL 2    buPROPion (WELLBUTRIN XL) 150 MG TB24 tablet Take 1 tablet (150 mg total) by mouth once daily. (Patient not taking: Reported on 7/17/2024) 30 tablet 11    dextroamphetamine-amphetamine (ADDERALL XR) 25 MG 24 hr capsule Take 25 mg by mouth every morning.      dextroamphetamine-amphetamine (ADDERALL XR) 30 MG 24 hr capsule Take by mouth every morning.      EPINEPHrine (AUVI-Q) 0.3 mg/0.3 mL AtIn Inject 0.3 mLs (0.3 mg total) into the muscle once. for 1 dose 0.3 mL 0    " fluticasone propionate (FLONASE) 50 mcg/actuation nasal spray USE 1 SPRAY (50 MCG TOTAL) IN EACH NOSTRIL ONCE DAILY (Patient not taking: Reported on 7/17/2024) 48 mL 1    hydrocortisone 2.5 % lotion Apply topically 2 (two) times daily. (Patient not taking: Reported on 7/17/2024) 59 mL 0    montelukast (SINGULAIR) 10 mg tablet TAKE 1 TABLET BY MOUTH EVERY DAY IN THE EVENING (Patient not taking: Reported on 7/17/2024) 90 tablet 1    triamcinolone acetonide 0.1% (KENALOG) 0.1 % ointment Apply topically 2 (two) times daily. 30 g 1     Current Facility-Administered Medications on File Prior to Visit   Medication Dose Route Frequency Provider Last Rate Last Admin    betamethasone acetate-betamethasone sodium phosphate (Celestone) 0.06 mg in sodium chloride 0.9% (1:100) 1 mL syringe   Subcutaneous Once Madeline Persaud MD        betamethasone acetate-betamethasone sodium phosphate (Celestone) 0.6 mg in sodium chloride 0.9% (1:10) 1 mL syringe   Subcutaneous Once Madeline Persaud MD        betamethasone acetate-betamethasone sodium phosphate (Celestone) 6 mg in sodium chloride 0.9% (1:1) 1 mL syringe   Subcutaneous Once Madeline Persaud MD            Review of patient's allergies indicates:   Allergen Reactions    Paraffin     Pcn [penicillins] Itching     Injection only.  Can tolerate oral form    Prednisone      Other reaction(s): increased heart rate        Review of Systems   Constitutional:  Negative for chills and fever.   HENT:  Negative for congestion and rhinorrhea.    Eyes:  Positive for itching. Negative for discharge.   Respiratory:  Negative for cough, shortness of breath and wheezing.    Skin:  Positive for rash.   Allergic/Immunologic: Positive for environmental allergies. Negative for food allergies.   Neurological:  Negative for facial asymmetry and speech difficulty.   Psychiatric/Behavioral:  Negative for behavioral problems and suicidal ideas.        Objective:   Physical Exam  Vitals reviewed.   Constitutional:        General: She is not in acute distress.     Appearance: Normal appearance. She is well-developed. She is not ill-appearing, toxic-appearing or diaphoretic.   HENT:      Head: Normocephalic and atraumatic.      Right Ear: Tympanic membrane, ear canal and external ear normal. There is no impacted cerumen.      Left Ear: Tympanic membrane, ear canal and external ear normal. There is no impacted cerumen.      Nose: Nose normal. No congestion or rhinorrhea.      Mouth/Throat:      Pharynx: No oropharyngeal exudate or posterior oropharyngeal erythema.   Eyes:      General: No scleral icterus.        Right eye: No discharge.         Left eye: No discharge.      Pupils: Pupils are equal, round, and reactive to light.   Neck:      Thyroid: No thyromegaly.   Cardiovascular:      Rate and Rhythm: Normal rate and regular rhythm.      Heart sounds: Normal heart sounds. No murmur heard.     No friction rub. No gallop.   Pulmonary:      Effort: Pulmonary effort is normal. No respiratory distress.      Breath sounds: Normal breath sounds. No stridor. No wheezing, rhonchi or rales.   Chest:      Chest wall: No tenderness.   Musculoskeletal:         General: Normal range of motion.      Cervical back: Normal range of motion and neck supple. No rigidity or tenderness. No muscular tenderness.   Lymphadenopathy:      Cervical: No cervical adenopathy.   Skin:     General: Skin is warm.      Coloration: Skin is not jaundiced or pale.      Findings: No bruising or erythema.   Neurological:      General: No focal deficit present.      Mental Status: She is alert and oriented to person, place, and time.      Gait: Gait normal.   Psychiatric:         Mood and Affect: Mood normal.         Behavior: Behavior normal.         Thought Content: Thought content normal.         Judgment: Judgment normal.           72 hour patch testing:    Negative for allergens    Assessment:      1. Seasonal allergic rhinitis due to pollen    2. Allergic rhinitis  due to [place] house dust mite    3. Drug allergy    4. Intrinsic atopic dermatitis    5. Penicillin allergy            Plan:     Seasonal allergic rhinitis due to pollen    Allergic rhinitis due to [place] house dust mite    Drug allergy    Intrinsic atopic dermatitis  -     hydrocortisone 2.5 % ointment; Apply topically 2 (two) times daily.  Dispense: 60 g; Refill: 2    Penicillin allergy      Recommend avoidance of celestone.     Schedule for an oral challenge to Amoxicillin.  Recommend she avoid PCN and medications in this class.    Hydrocortisone for atopic dermatitis    RTC 2-4 weeks - oral challenge to Amoxicillin        BRYCE PARADA spent a total of  33minutes on the day of the visit.This includes face to face time and non-face to face time preparing to see the patient (eg, review of tests), obtaining and/or reviewing separately obtained history, documenting clinical information in the electronic or other health record, independently interpreting results and communicating results to the patient/family/caregiver, or care coordinator.

## 2024-10-16 ENCOUNTER — OFFICE VISIT (OUTPATIENT)
Dept: ORTHOPEDICS | Facility: CLINIC | Age: 44
End: 2024-10-16
Payer: COMMERCIAL

## 2024-10-16 VITALS — HEIGHT: 65 IN | BODY MASS INDEX: 26.85 KG/M2 | WEIGHT: 161.19 LBS

## 2024-10-16 DIAGNOSIS — G56.03 BILATERAL CARPAL TUNNEL SYNDROME: Primary | ICD-10-CM

## 2024-10-16 PROCEDURE — 1159F MED LIST DOCD IN RCRD: CPT | Mod: CPTII,S$GLB,, | Performed by: ORTHOPAEDIC SURGERY

## 2024-10-16 PROCEDURE — 3044F HG A1C LEVEL LT 7.0%: CPT | Mod: CPTII,S$GLB,, | Performed by: ORTHOPAEDIC SURGERY

## 2024-10-16 PROCEDURE — 20526 THER INJECTION CARP TUNNEL: CPT | Mod: 50,S$GLB,, | Performed by: ORTHOPAEDIC SURGERY

## 2024-10-16 PROCEDURE — 99213 OFFICE O/P EST LOW 20 MIN: CPT | Mod: 25,S$GLB,, | Performed by: ORTHOPAEDIC SURGERY

## 2024-10-16 PROCEDURE — 99999 PR PBB SHADOW E&M-EST. PATIENT-LVL III: CPT | Mod: PBBFAC,,, | Performed by: ORTHOPAEDIC SURGERY

## 2024-10-16 PROCEDURE — 3008F BODY MASS INDEX DOCD: CPT | Mod: CPTII,S$GLB,, | Performed by: ORTHOPAEDIC SURGERY

## 2024-10-16 PROCEDURE — 1160F RVW MEDS BY RX/DR IN RCRD: CPT | Mod: CPTII,S$GLB,, | Performed by: ORTHOPAEDIC SURGERY

## 2024-10-16 RX ORDER — METHYLPREDNISOLONE ACETATE 40 MG/ML
40 INJECTION, SUSPENSION INTRA-ARTICULAR; INTRALESIONAL; INTRAMUSCULAR; SOFT TISSUE
Status: DISCONTINUED | OUTPATIENT
Start: 2024-10-16 | End: 2024-10-16 | Stop reason: HOSPADM

## 2024-10-16 RX ADMIN — METHYLPREDNISOLONE ACETATE 40 MG: 40 INJECTION, SUSPENSION INTRA-ARTICULAR; INTRALESIONAL; INTRAMUSCULAR; SOFT TISSUE at 02:10

## 2024-10-16 NOTE — PROCEDURES
Carpal Tunnel    Date/Time: 10/16/2024 2:00 PM    Performed by: Amol Reinoso MD  Authorized by: Amol Reinoso MD    Consent Done?:  Yes (Verbal)  Indications:  Pain  Timeout: prior to procedure the correct patient, procedure, and site was verified    Prep: patient was prepped and draped in usual sterile fashion      Local anesthesia used?: Yes    Local anesthetic:  Lidocaine 2% without epinephrine  Anesthetic total (ml):  2    Location:  Wrist  Site:  R carpal tunnel and L carpal tunnel  Ultrasonic Guidance for Needle Placement?: No    Needle size:  25 G  Approach:  Volar  Medications:  40 mg methylPREDNISolone acetate 40 mg/mL; 40 mg methylPREDNISolone acetate 40 mg/mL

## 2024-10-16 NOTE — PROGRESS NOTES
Subjective:     Patient ID: Duane Romero is a 44 y.o. female.    Chief Complaint: Pain of the Left Hand and Pain of the Right Hand      HPI:  The patient is a 44-year-old female with bilateral carpal tunnel syndrome last injected 07/10/2024 with good results until recently who requests reinjection today this was documented by nerve conduction studies 2024 by Dr. Heard.  She has allergies to triamcinolone and we injected Depo-Medrol last time with no problem.    Past Medical History:   Diagnosis Date    Allergy     Asthma     Hyperlipidemia     Otitis media     Prediabetes     Sinusitis      Past Surgical History:   Procedure Laterality Date    ADENOIDECTOMY       SECTION      CYST REMOVAL Right     Under right arm    DILATION AND CURETTAGE OF UTERUS      HERNIA REPAIR  2022    HYSTERECTOMY  2021    FRANCO/BS    IMPLANTATION OF BONE ANCHORED HEARING AID (BAHA) Bilateral 2022    Procedure: INSERTION, BAHA;  Surgeon: Jeff Kelley MD;  Location: [facility] OR;  Service: ENT;  Laterality: Bilateral;    TONSILLECTOMY      TUBAL LIGATION      TYMPANOPLASTY      TYMPANOSTOMY TUBE PLACEMENT      UMBILICAL HERNIA REPAIR N/A 2022    Procedure: REPAIR, HERNIA, UMBILICAL, AGE 5 YEARS OR OLDER;  Surgeon: Philipp Paiz MD;  Location: [facility] OR;  Service: General;  Laterality: N/A;     Family History   Problem Relation Name Age of Onset    Hyperlipidemia Mother Mom     Diabetes Father Father         DM2 insulin pump & oral    Hypertension Father Father     Obesity Father Father     Breast cancer Maternal Aunt Aunt     Cancer Maternal Aunt Aunt         Skin cancer    Thyroid disease Maternal Grandmother Rima alcon     Migraines Neg Hx      Asthma Neg Hx       Social History     Socioeconomic History    Marital status:    Tobacco Use    Smoking status: Never    Smokeless tobacco: Never   Substance and Sexual Activity    Alcohol use: Not Currently     Comment: Occas. last drink on  06/22/2022    Drug use: Never    Sexual activity: Not Currently     Partners: Male     Birth control/protection: None     Comment: I had a partial hysterectomy 5-   Social History Narrative    Patient goal(s): Weight of 130 lbs    Motivation: Stay healthy (prevent dz/need for rx) to enjoy outdoor activities with family or by herself    Breakfast: 1-2 sausages & 1-2 eggs with 1 slice bread; water & coffee w/ Stevia & sugar-free creamer    Lunch: Boneless chicken wings & 1 thin slice of cheesecake or 1 turkey sandwich on white bread, 1 slice of American cheese; water    Dinner: Hamburger (90:10 or 80:20 meat) with cheese & bread or chicken; water or wine 5.5 oz    Snacks: Apples or 1-2 small bag of chips daily (160 jamaal)    Eating out: 3-4x/wk    Water (oz/day): 102 oz/d    Physical Activity: 35-50 min/d of resistance/aerobic & 4-5d/wk     Strategies: Lose it radha (smaller portions; lower carb)     Social Drivers of Health     Financial Resource Strain: Patient Declined (2/22/2024)    Overall Financial Resource Strain (CARDIA)     Difficulty of Paying Living Expenses: Patient declined   Food Insecurity: No Food Insecurity (2/22/2024)    Hunger Vital Sign     Worried About Running Out of Food in the Last Year: Never true     Ran Out of Food in the Last Year: Never true   Transportation Needs: No Transportation Needs (2/22/2024)    PRAPARE - Transportation     Lack of Transportation (Medical): No     Lack of Transportation (Non-Medical): No   Physical Activity: Unknown (2/22/2024)    Exercise Vital Sign     Days of Exercise per Week: 7 days   Stress: No Stress Concern Present (2/22/2024)    [employer] [place] [employer] - Occupational Stress Questionnaire     Feeling of Stress : Not at all   Housing Stability: Low Risk  (2/22/2024)    Housing Stability Vital Sign     Unable to Pay for Housing in the Last Year: No     Number of Places Lived in the Last Year: 2     Unstable Housing in the Last Year: No      Medication List with Changes/Refills   Current Medications    AZELASTINE (ASTELIN) 137 MCG (0.1 %) NASAL SPRAY    1 spray (137 mcg total) by Nasal route 2 (two) times daily.    BUPROPION (WELLBUTRIN XL) 150 MG TB24 TABLET    Take 1 tablet (150 mg total) by mouth once daily.    DEXTROAMPHETAMINE-AMPHETAMINE (ADDERALL XR) 25 MG 24 HR CAPSULE    Take 25 mg by mouth every morning.    DEXTROAMPHETAMINE-AMPHETAMINE (ADDERALL XR) 30 MG 24 HR CAPSULE    Take by mouth every morning.    EPINEPHRINE (AUVI-Q) 0.3 MG/0.3 ML ATIN    Inject 0.3 mLs (0.3 mg total) into the muscle once. for 1 dose    FLUTICASONE PROPIONATE (FLONASE) 50 MCG/ACTUATION NASAL SPRAY    USE 1 SPRAY (50 MCG TOTAL) IN EACH NOSTRIL ONCE DAILY    HYDROCORTISONE 2.5 % LOTION    Apply topically 2 (two) times daily.    HYDROCORTISONE 2.5 % OINTMENT    Apply topically 2 (two) times daily.    MONTELUKAST (SINGULAIR) 10 MG TABLET    TAKE 1 TABLET BY MOUTH EVERY DAY IN THE EVENING    TRIAMCINOLONE ACETONIDE 0.1% (KENALOG) 0.1 % OINTMENT    Apply topically 2 (two) times daily.     Review of patient's allergies indicates:   Allergen Reactions    Paraffin     Pcn [penicillins] Itching     Injection only.  Can tolerate oral form    Prednisone      Other reaction(s): increased heart rate     Review of Systems   Constitutional: Negative for malaise/fatigue.   HENT:  Positive for hearing loss.    Eyes:  Negative for double vision and visual disturbance.   Cardiovascular:  Positive for dyspnea on exertion and palpitations. Negative for chest pain.   Respiratory:  Negative for shortness of breath.    Endocrine: Negative for cold intolerance.   Hematologic/Lymphatic: Does not bruise/bleed easily.   Skin:  Negative for poor wound healing and suspicious lesions.   Musculoskeletal:  Negative for gout, joint pain and joint swelling.   Gastrointestinal:  Negative for nausea and vomiting.   Genitourinary:  Negative for dysuria.   Neurological:  Positive for numbness,  paresthesias and sensory change.   Psychiatric/Behavioral:  Negative for depression, memory loss and substance abuse. The patient is not nervous/anxious.    Allergic/Immunologic: Negative for persistent infections.       Objective:   Body mass index is 27.21 kg/m².  There were no vitals filed for this visit.              radiographs were not obtained today  Assessment:     Encounter Diagnosis   Name Primary?    Bilateral carpal tunnel syndrome Yes        Plan:       The patient injected in both carpal tunnels each with 1 cc Depo-Medrol and 1 cc 2% plain lidocaine under sterile technique.  She will wait at least 3 months between injections.              Disclaimer: This note was prepared using a voice recognition system and is likely to have sound alike errors within the text.

## 2024-12-26 ENCOUNTER — OFFICE VISIT (OUTPATIENT)
Dept: URGENT CARE | Facility: CLINIC | Age: 44
End: 2024-12-26
Payer: COMMERCIAL

## 2024-12-26 VITALS
SYSTOLIC BLOOD PRESSURE: 148 MMHG | DIASTOLIC BLOOD PRESSURE: 91 MMHG | HEART RATE: 72 BPM | HEIGHT: 64 IN | WEIGHT: 160.25 LBS | RESPIRATION RATE: 16 BRPM | BODY MASS INDEX: 27.36 KG/M2 | TEMPERATURE: 98 F | OXYGEN SATURATION: 98 %

## 2024-12-26 DIAGNOSIS — R05.9 COUGH, UNSPECIFIED TYPE: ICD-10-CM

## 2024-12-26 DIAGNOSIS — J30.89 ENVIRONMENTAL AND SEASONAL ALLERGIES: Primary | ICD-10-CM

## 2024-12-26 DIAGNOSIS — R09.82 POST-NASAL DRIP: ICD-10-CM

## 2024-12-26 DIAGNOSIS — R09.81 NASAL CONGESTION: ICD-10-CM

## 2024-12-26 DIAGNOSIS — R06.7 SNEEZING: ICD-10-CM

## 2024-12-26 LAB
CTP QC/QA: YES
POC MOLECULAR INFLUENZA A AGN: NEGATIVE
POC MOLECULAR INFLUENZA B AGN: NEGATIVE

## 2024-12-26 PROCEDURE — 87502 INFLUENZA DNA AMP PROBE: CPT | Mod: QW,S$GLB,, | Performed by: NURSE PRACTITIONER

## 2024-12-26 PROCEDURE — 99214 OFFICE O/P EST MOD 30 MIN: CPT | Mod: S$GLB,,, | Performed by: NURSE PRACTITIONER

## 2024-12-26 RX ORDER — BENZONATATE 200 MG/1
200 CAPSULE ORAL 3 TIMES DAILY PRN
Qty: 30 CAPSULE | Refills: 0 | Status: SHIPPED | OUTPATIENT
Start: 2024-12-26 | End: 2025-01-05

## 2024-12-26 RX ORDER — PROMETHAZINE HYDROCHLORIDE AND DEXTROMETHORPHAN HYDROBROMIDE 6.25; 15 MG/5ML; MG/5ML
5 SYRUP ORAL EVERY 6 HOURS PRN
Qty: 118 ML | Refills: 0 | Status: SHIPPED | OUTPATIENT
Start: 2024-12-26 | End: 2025-01-02

## 2024-12-26 RX ORDER — FLUTICASONE PROPIONATE 50 MCG
2 SPRAY, SUSPENSION (ML) NASAL DAILY
Qty: 16 G | Refills: 0 | Status: SHIPPED | OUTPATIENT
Start: 2024-12-26

## 2024-12-26 NOTE — PATIENT INSTRUCTIONS
Maintain hydration/increase fluids  Steam  Humidifier  Avoidance discussed  Increase Astelin 2 sprays each nostril twice daily  Flonase 2 sprays each nostril  Allegra D as directed for symptom management  Typical course and duration of illness discussed  Signs and symptoms of worsening discussed  Follow up as needed/with worsening

## 2024-12-26 NOTE — PROGRESS NOTES
"Subjective:      Patient ID: Duane Romero is a 44 y.o. female.    Vitals:  height is 5' 4" (1.626 m) and weight is 72.7 kg (160 lb 4.4 oz). Her tympanic temperature is 97.5 °F (36.4 °C). Her blood pressure is 148/91 (abnormal) and her pulse is 72. Her respiration is 16 and oxygen saturation is 98%.     Chief Complaint: Sinus Problem    44 year old female presents for evaluation of post nasal drip, fluid in ears, cough, sneezing x 5 days. Symptom onset Saturday night. Reports slight improvement since onset. (-) home Covid test Tuesday morning. Astelin nasal spray (1 spray) and allegra with mild relief. History of seasonal allergies and Down allergy, states that she recently bought a new comforter and noticed that it is mixed with Down today. No known sick contacts. Allergy to Celestone: shortness of breath, heart racing      Sinus Problem  This is a new problem. The current episode started in the past 7 days. There has been no fever. Her pain is at a severity of 0/10. She is experiencing no pain. Associated symptoms include congestion, coughing, ear pain (L>R), sinus pressure and sneezing. Pertinent negatives include no chills, diaphoresis, headaches, hoarse voice, neck pain, shortness of breath, sore throat or swollen glands. Past treatments include nasal decongestants. The treatment provided mild relief.       Constitution: Positive for appetite change (due to post nasal drip). Negative for chills, sweating, fatigue and fever.   HENT:  Positive for ear pain (L>R), congestion, postnasal drip and sinus pressure. Negative for sore throat.    Neck: neck negative. Negative for neck pain.   Cardiovascular: Negative.    Eyes: Negative.    Respiratory:  Positive for cough and sputum production (night time). Negative for shortness of breath and wheezing.    Gastrointestinal: Negative.    Endocrine: negative.   Genitourinary: Negative.    Musculoskeletal: Negative.    Skin: Negative.    Allergic/Immunologic: " Positive for environmental allergies, seasonal allergies and sneezing.   Neurological:  Negative for headaches.   Hematologic/Lymphatic: Negative.    Psychiatric/Behavioral: Negative.        Objective:     Physical Exam   Constitutional: She is oriented to person, place, and time. She is cooperative.  Non-toxic appearance. She does not appear ill. No distress. She is not intubated. normalawake  HENT:   Head: Normocephalic and atraumatic.   Ears:   Right Ear: No lacerations. No no drainage or swelling. No foreign bodies. Tympanic membrane is injected. Tympanic membrane is not scarred, not perforated, not erythematous, not retracted and not bulging. Tympanic membrane mobility is normal. no impacted cerumen  Left Ear: No lacerations. No no drainage or swelling. No foreign bodies. Tympanic membrane is injected and retracted. Tympanic membrane is not scarred, not perforated, not erythematous and not bulging. Tympanic membrane mobility is normal. no impacted cerumen  Nose: Rhinorrhea present. No mucosal edema. Right sinus exhibits no maxillary sinus tenderness and no frontal sinus tenderness. Left sinus exhibits no maxillary sinus tenderness and no frontal sinus tenderness.   Mouth/Throat: Uvula is midline, oropharynx is clear and moist and mucous membranes are normal. Mucous membranes are moist. Cobblestoning present. No oropharyngeal exudate, posterior oropharyngeal edema, posterior oropharyngeal erythema or tonsillar abscesses. Tonsils are 0 on the right. Tonsils are 0 on the left. No tonsillar exudate.   Bilateral cochlear implants noted      Comments: Bilateral cochlear implants noted  Eyes: Conjunctivae are normal. Pupils are equal, round, and reactive to light. Right eye exhibits no discharge. Left eye exhibits no discharge. No scleral icterus. Extraocular movement intact   Neck: Neck supple.   Cardiovascular: Normal rate, regular rhythm and normal heart sounds.   Pulmonary/Chest: Effort normal and breath sounds  normal. No accessory muscle usage or stridor. No apnea, no tachypnea and no bradypnea. She is not intubated. No respiratory distress. She has no decreased breath sounds. She has no wheezes. She has no rhonchi. She has no rales. She exhibits no tenderness.   Abdominal: Normal appearance.   Musculoskeletal: Normal range of motion.         General: Normal range of motion.   Neurological: no focal deficit. She is alert and oriented to person, place, and time.   Skin: Skin is warm, dry and not diaphoretic.   Psychiatric: Her behavior is normal. Mood, judgment and thought content normal.   Nursing note and vitals reviewed.    Results for orders placed or performed in visit on 12/26/24   POCT Influenza A/B MOLECULAR    Collection Time: 12/26/24  1:38 PM   Result Value Ref Range    POC Molecular Influenza A Ag Negative Negative    POC Molecular Influenza B Ag Negative Negative     Acceptable Yes        Assessment:     1. Environmental and seasonal allergies    2. Post-nasal drip    3. Cough, unspecified type    4. Sneezing    5. Nasal congestion        Plan:       Environmental and seasonal allergies    Post-nasal drip    Cough, unspecified type  -     POCT Influenza A/B MOLECULAR  -     promethazine-dextromethorphan (PROMETHAZINE-DM) 6.25-15 mg/5 mL Syrp; Take 5 mLs by mouth every 6 (six) hours as needed (cough).  Dispense: 118 mL; Refill: 0  -     benzonatate (TESSALON) 200 MG capsule; Take 1 capsule (200 mg total) by mouth 3 (three) times daily as needed for Cough (cough).  Dispense: 30 capsule; Refill: 0    Sneezing  -     POCT Influenza A/B MOLECULAR    Nasal congestion  -     fluticasone propionate (FLONASE) 50 mcg/actuation nasal spray; 2 sprays (100 mcg total) by Each Nostril route once daily.  Dispense: 16 g; Refill: 0        Patient presents with symptoms and examination that are consistent with allergy exacerbation. (-)Flu swab discussed. Exam negative for otitis media/bacterial sinusitis/bacterial  tonsillitis/bronchitis/pneumonia. Plan is to manage symptoms, prevent worsening, and follow up as needed/with worsening. Discussed with patient who verbalizes understanding.         Patient Instructions   Maintain hydration/increase fluids  Steam  Humidifier  Avoidance discussed  Increase Astelin 2 sprays each nostril twice daily  Flonase 2 sprays each nostril  Allegra D as directed for symptom management  Typical course and duration of illness discussed  Signs and symptoms of worsening discussed  Follow up as needed/with worsening

## 2025-01-13 ENCOUNTER — CLINICAL SUPPORT (OUTPATIENT)
Dept: AUDIOLOGY | Facility: CLINIC | Age: 45
End: 2025-01-13
Payer: COMMERCIAL

## 2025-01-13 ENCOUNTER — PATIENT MESSAGE (OUTPATIENT)
Dept: AUDIOLOGY | Facility: CLINIC | Age: 45
End: 2025-01-13

## 2025-01-13 DIAGNOSIS — Z96.21 STATUS POST PLACEMENT OF BONE ANCHORED HEARING AID (BAHA): Primary | ICD-10-CM

## 2025-01-13 NOTE — PROGRESS NOTES
Duane Romero was seen 01/13/2025 for a Bone Anchored Hearing Aid visit. One of her original processors is experiencing high battery drain. Lasting 1 day instead of 2-3. She noticed this about 2 months ago. She has been wearing her backup processors and having no issues. She will call [facility] for servicing once she double checks that it was not a bad batch of batteries and which processor it is causing the drain.     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 She has been asked at school to keep her voice down while in the classroom. She is asking me for a letter detailing her hearing loss and the problems associated with it to present to her employer if needed. I will have this ready for her and uploaded into her chart by the end of the week.

## 2025-01-17 ENCOUNTER — OFFICE VISIT (OUTPATIENT)
Dept: ORTHOPEDICS | Facility: CLINIC | Age: 45
End: 2025-01-17
Payer: COMMERCIAL

## 2025-01-17 VITALS — WEIGHT: 160.25 LBS | HEIGHT: 64 IN | BODY MASS INDEX: 27.36 KG/M2

## 2025-01-17 DIAGNOSIS — M77.12 LATERAL EPICONDYLITIS OF BOTH ELBOWS: ICD-10-CM

## 2025-01-17 DIAGNOSIS — G56.03 BILATERAL CARPAL TUNNEL SYNDROME: Primary | ICD-10-CM

## 2025-01-17 DIAGNOSIS — M77.11 LATERAL EPICONDYLITIS OF BOTH ELBOWS: ICD-10-CM

## 2025-01-17 PROCEDURE — 20551 NJX 1 TENDON ORIGIN/INSJ: CPT | Mod: 51,XS,S$GLB, | Performed by: ORTHOPAEDIC SURGERY

## 2025-01-17 PROCEDURE — 99999 PR PBB SHADOW E&M-EST. PATIENT-LVL III: CPT | Mod: PBBFAC,,, | Performed by: ORTHOPAEDIC SURGERY

## 2025-01-17 PROCEDURE — 99213 OFFICE O/P EST LOW 20 MIN: CPT | Mod: 25,S$GLB,, | Performed by: ORTHOPAEDIC SURGERY

## 2025-01-17 PROCEDURE — 1160F RVW MEDS BY RX/DR IN RCRD: CPT | Mod: CPTII,S$GLB,, | Performed by: ORTHOPAEDIC SURGERY

## 2025-01-17 PROCEDURE — 3008F BODY MASS INDEX DOCD: CPT | Mod: CPTII,S$GLB,, | Performed by: ORTHOPAEDIC SURGERY

## 2025-01-17 PROCEDURE — 1159F MED LIST DOCD IN RCRD: CPT | Mod: CPTII,S$GLB,, | Performed by: ORTHOPAEDIC SURGERY

## 2025-01-17 PROCEDURE — 20526 THER INJECTION CARP TUNNEL: CPT | Mod: 50,S$GLB,, | Performed by: ORTHOPAEDIC SURGERY

## 2025-01-17 RX ORDER — TRIAMCINOLONE ACETONIDE 40 MG/ML
40 INJECTION, SUSPENSION INTRA-ARTICULAR; INTRAMUSCULAR
Status: DISCONTINUED | OUTPATIENT
Start: 2025-01-17 | End: 2025-01-17 | Stop reason: HOSPADM

## 2025-01-17 RX ADMIN — TRIAMCINOLONE ACETONIDE 40 MG: 40 INJECTION, SUSPENSION INTRA-ARTICULAR; INTRAMUSCULAR at 10:01

## 2025-01-17 NOTE — PROGRESS NOTES
Subjective:     Patient ID: Duane Romero is a 44 y.o. female.    Chief Complaint: Pain of the Right Hand, Pain of the Left Hand, Pain of the Right Elbow, and Pain of the Left Elbow      HPI:  The patient is a 44-year-old female seen in follow-up with bilateral carpal tunnel syndrome status post bilateral carpal tunnel injections 10/16/2024.  She had both elbow lateral epicondyles injected 07/10/2024.  She request injection both carpal tunnels in both elbow lateral epicondylitis.  She had positive nerve conduction studies 2024 by Dr. Heard.    Past Medical History:   Diagnosis Date    Allergy     Asthma     Hyperlipidemia     Otitis media     Prediabetes     Sinusitis      Past Surgical History:   Procedure Laterality Date    ADENOIDECTOMY       SECTION      CYST REMOVAL Right     Under right arm    DILATION AND CURETTAGE OF UTERUS      HERNIA REPAIR  2022    HYSTERECTOMY  2021    FRANCO/BS    IMPLANTATION OF BONE ANCHORED HEARING AID (BAHA) Bilateral 2022    Procedure: INSERTION, BAHA;  Surgeon: Jeff Kelley MD;  Location: [facility] OR;  Service: ENT;  Laterality: Bilateral;    TONSILLECTOMY      TUBAL LIGATION      TYMPANOPLASTY      TYMPANOSTOMY TUBE PLACEMENT      UMBILICAL HERNIA REPAIR N/A 2022    Procedure: REPAIR, HERNIA, UMBILICAL, AGE 5 YEARS OR OLDER;  Surgeon: Philipp Paiz MD;  Location: [facility] OR;  Service: General;  Laterality: N/A;     Family History   Problem Relation Name Age of Onset    Hyperlipidemia Mother Mom     Diabetes Father Father         DM2 insulin pump & oral    Hypertension Father Father     Obesity Father Father     Breast cancer Maternal Aunt Aunt     Cancer Maternal Aunt Aunt         Skin cancer    Thyroid disease Maternal Grandmother Rimaroxana rondon     Migraines Neg Hx      Asthma Neg Hx       Social History     Socioeconomic History    Marital status:    Tobacco Use    Smoking status: Never    Smokeless tobacco: Never   Substance and  Sexual Activity    Alcohol use: Not Currently     Comment: Occas. last drink on 06/22/2022    Drug use: Never    Sexual activity: Not Currently     Partners: Male     Birth control/protection: None     Comment: I had a partial hysterectomy 5-   Social History Narrative    Patient goal(s): Weight of 130 lbs    Motivation: Stay healthy (prevent dz/need for rx) to enjoy outdoor activities with family or by herself    Breakfast: 1-2 sausages & 1-2 eggs with 1 slice bread; water & coffee w/ Stevia & sugar-free creamer    Lunch: Boneless chicken wings & 1 thin slice of cheesecake or 1 turkey sandwich on white bread, 1 slice of American cheese; water    Dinner: Hamburger (90:10 or 80:20 meat) with cheese & bread or chicken; water or wine 5.5 oz    Snacks: Apples or 1-2 small bag of chips daily (160 jamaal)    Eating out: 3-4x/wk    Water (oz/day): 102 oz/d    Physical Activity: 35-50 min/d of resistance/aerobic & 4-5d/wk     Strategies: Lose it radha (smaller portions; lower carb)     Social Drivers of Health     Financial Resource Strain: Patient Declined (2/22/2024)    Overall Financial Resource Strain (CARDIA)     Difficulty of Paying Living Expenses: Patient declined   Food Insecurity: No Food Insecurity (2/22/2024)    Hunger Vital Sign     Worried About Running Out of Food in the Last Year: Never true     Ran Out of Food in the Last Year: Never true   Transportation Needs: No Transportation Needs (2/22/2024)    PRAPARE - Transportation     Lack of Transportation (Medical): No     Lack of Transportation (Non-Medical): No   Physical Activity: Unknown (2/22/2024)    Exercise Vital Sign     Days of Exercise per Week: 7 days   Stress: No Stress Concern Present (2/22/2024)    [employer] [place] [employer] - Occupational Stress Questionnaire     Feeling of Stress : Not at all   Housing Stability: Low Risk  (2/22/2024)    Housing Stability Vital Sign     Unable to Pay for Housing in the Last Year: No     Number  of Places Lived in the Last Year: 2     Unstable Housing in the Last Year: No     Medication List with Changes/Refills   Current Medications    AZELASTINE (ASTELIN) 137 MCG (0.1 %) NASAL SPRAY    1 spray (137 mcg total) by Nasal route 2 (two) times daily.    BUPROPION (WELLBUTRIN XL) 150 MG TB24 TABLET    Take 1 tablet (150 mg total) by mouth once daily.    DEXTROAMPHETAMINE-AMPHETAMINE (ADDERALL XR) 25 MG 24 HR CAPSULE    Take 25 mg by mouth every morning.    DEXTROAMPHETAMINE-AMPHETAMINE (ADDERALL XR) 30 MG 24 HR CAPSULE    Take by mouth every morning.    EPINEPHRINE (AUVI-Q) 0.3 MG/0.3 ML ATIN    Inject 0.3 mLs (0.3 mg total) into the muscle once. for 1 dose    FLUTICASONE PROPIONATE (FLONASE) 50 MCG/ACTUATION NASAL SPRAY    USE 1 SPRAY (50 MCG TOTAL) IN EACH NOSTRIL ONCE DAILY    FLUTICASONE PROPIONATE (FLONASE) 50 MCG/ACTUATION NASAL SPRAY    2 sprays (100 mcg total) by Each Nostril route once daily.    HYDROCORTISONE 2.5 % LOTION    Apply topically 2 (two) times daily.    HYDROCORTISONE 2.5 % OINTMENT    Apply topically 2 (two) times daily.    MONTELUKAST (SINGULAIR) 10 MG TABLET    TAKE 1 TABLET BY MOUTH EVERY DAY IN THE EVENING    TRIAMCINOLONE ACETONIDE 0.1% (KENALOG) 0.1 % OINTMENT    Apply topically 2 (two) times daily.     Review of patient's allergies indicates:   Allergen Reactions    Paraffin     Pcn [penicillins] Itching     Injection only.  Can tolerate oral form    Prednisone      Other reaction(s): increased heart rate     Review of Systems   Constitutional: Negative for malaise/fatigue.   HENT:  Positive for hearing loss.    Eyes:  Negative for double vision and visual disturbance.   Cardiovascular:  Positive for dyspnea on exertion and palpitations. Negative for chest pain.   Respiratory:  Negative for shortness of breath.    Endocrine: Negative for cold intolerance.   Hematologic/Lymphatic: Does not bruise/bleed easily.   Skin:  Negative for poor wound healing and suspicious lesions.    Musculoskeletal:  Negative for gout, joint pain and joint swelling.   Gastrointestinal:  Negative for nausea and vomiting.   Genitourinary:  Negative for dysuria.   Neurological:  Positive for numbness, paresthesias and sensory change.   Psychiatric/Behavioral:  Negative for depression, memory loss and substance abuse. The patient is not nervous/anxious.    Allergic/Immunologic: Negative for persistent infections.       Objective:   Body mass index is 27.51 kg/m².  There were no vitals filed for this visit.             General    Constitutional: She is oriented to person, place, and time. She appears well-developed and well-nourished. No distress.   HENT:   Head: Normocephalic.   Eyes: EOM are normal.   Pulmonary/Chest: Effort normal.   Neurological: She is oriented to person, place, and time.   Psychiatric: She has a normal mood and affect.             Right Hand/Wrist Exam     Inspection   Scars: Wrist - absent Hand -  absent  Effusion: Wrist - absent Hand -  absent    Pain   Wrist - The patient exhibits pain of the flexor/pronator group and lateral epicondyle.    Tests   Phalens sign: positive  Tinel's sign (median nerve): positive  Carpal Tunnel Compression Test: positive    Atrophy   Thenar:  negative  Intrinsic:  negative    Other     Neuorologic Exam    Median Distribution: abnormal  Ulnar Distribution: normal  Radial Distribution: normal    Comments:  The patient has tenderness right elbow lateral epicondyle with pain on resisted wrist and finger extension.  She has a positive Tinel and positive Phalen sign.  There is no thenar atrophy noted.      Left Hand/Wrist Exam     Inspection   Scars: Wrist - absent Hand -  absent  Effusion: Wrist - absent Hand -  absent    Pain   Wrist - The patient exhibits pain of the flexor/pronator group and lateral epicondyle.    Tests   Phalens sign: positive  Tinel's sign (median nerve): positive  Carpal Tunnel Compression Test: positive    Atrophy  Thenar:   Negative  Intrinsic: negative    Other     Sensory Exam  Median Distribution: abnormal  Ulnar Distribution: normal  Radial Distribution: normal    Comments:  The patient has tenderness left elbow lateral epicondyle with pain on resisted wrist and finger extension.  She has a positive Tinel and positive Phalen sign.  There is no thenar atrophy noted.          Vascular Exam       Capillary Refill  Right Hand: normal capillary refill  Left Hand: normal capillary refill       radiographs were not obtained today  Assessment:     Encounter Diagnoses   Name Primary?    Bilateral carpal tunnel syndrome Yes    Lateral epicondylitis of both elbows         Plan:     The patient had both elbow lateral epicondyles injected with 1 cc Kenalog and 1 cc 2% plain lidocaine under sterile technique.  She had both carpal tunnels injected each with 1 cc Kenalog and 1 cc 2% plain lidocaine under sterile technique.  She will wait at least 3 months between injections.              Disclaimer: This note was prepared using a voice recognition system and is likely to have sound alike errors within the text.

## 2025-01-17 NOTE — PROCEDURES
Tendon Origin: R elbow, L elbow    Date/Time: 1/17/2025 10:15 AM    Performed by: Amol Reinoso MD  Authorized by: Amol Reinoso MD    Consent Done?:  Yes (Verbal)  Timeout: prior to procedure the correct patient, procedure, and site was verified    Timeout: prior to procedure the correct patient, procedure, and site was verified    Location:  Elbow  Site:  R elbow and L elbow  Prep: patient was prepped and draped in usual sterile fashion    Ultrasonic Guidance for Needle Placement?: No    Needle size:  25 G  Approach:  Anterolateral  Medications:  40 mg triamcinolone acetonide 40 mg/mL; 40 mg triamcinolone acetonide 40 mg/mL

## 2025-01-17 NOTE — PROCEDURES
Carpal Tunnel: L carpal tunnel, R carpal tunnel    Date/Time: 1/17/2025 10:15 AM    Performed by: Amol Reinoso MD  Authorized by: Amol Reinoso MD    Consent Done?:  Yes (Verbal)  Indications:  Pain  Timeout: prior to procedure the correct patient, procedure, and site was verified    Prep: patient was prepped and draped in usual sterile fashion      Local anesthesia used?: Yes    Local anesthetic:  Lidocaine 2% without epinephrine  Anesthetic total (ml):  2    Location:  Wrist  Site:  L carpal tunnel and R carpal tunnel  Ultrasonic Guidance for Needle Placement?: No    Needle size:  25 G  Approach:  Volar  Medications:  40 mg triamcinolone acetonide 40 mg/mL; 40 mg triamcinolone acetonide 40 mg/mL

## 2025-01-28 ENCOUNTER — PATIENT MESSAGE (OUTPATIENT)
Dept: AUDIOLOGY | Facility: CLINIC | Age: 45
End: 2025-01-28
Payer: COMMERCIAL

## 2025-02-20 ENCOUNTER — OFFICE VISIT (OUTPATIENT)
Dept: URGENT CARE | Facility: CLINIC | Age: 45
End: 2025-02-20
Payer: COMMERCIAL

## 2025-02-20 VITALS
WEIGHT: 157.5 LBS | OXYGEN SATURATION: 97 % | BODY MASS INDEX: 26.89 KG/M2 | HEIGHT: 64 IN | TEMPERATURE: 98 F | DIASTOLIC BLOOD PRESSURE: 82 MMHG | HEART RATE: 69 BPM | SYSTOLIC BLOOD PRESSURE: 155 MMHG | RESPIRATION RATE: 18 BRPM

## 2025-02-20 DIAGNOSIS — J30.2 SEASONAL ALLERGIC RHINITIS, UNSPECIFIED TRIGGER: Primary | ICD-10-CM

## 2025-02-20 RX ORDER — AZELASTINE 1 MG/ML
1 SPRAY, METERED NASAL 2 TIMES DAILY
Qty: 30 ML | Refills: 0 | Status: SHIPPED | OUTPATIENT
Start: 2025-02-20

## 2025-02-20 RX ORDER — MONTELUKAST SODIUM 10 MG/1
10 TABLET ORAL NIGHTLY
Qty: 30 TABLET | Refills: 0 | Status: SHIPPED | OUTPATIENT
Start: 2025-02-20 | End: 2025-03-22

## 2025-02-20 RX ORDER — FEXOFENADINE HCL 60 MG/1
60 TABLET, FILM COATED ORAL DAILY
COMMUNITY

## 2025-02-20 RX ORDER — METHYLPREDNISOLONE 4 MG/1
TABLET ORAL
Qty: 21 TABLET | Refills: 0 | Status: SHIPPED | OUTPATIENT
Start: 2025-02-20

## 2025-02-20 NOTE — PROGRESS NOTES
"Subjective:      Patient ID: Duane Romero is a 44 y.o. female.    Vitals:  height is 5' 4.25" (1.632 m) and weight is 71.5 kg (157 lb 8.3 oz). Her tympanic temperature is 97.7 °F (36.5 °C). Her blood pressure is 155/82 (abnormal) and her pulse is 69. Her respiration is 18 and oxygen saturation is 97%.     Chief Complaint: Sinus Problem    Patient presents with sinus drainage, sore throat, left ear pain.  Symptoms started 2 weeks ago and worsening.      Sinus Problem  This is a new problem. The current episode started 1 to 4 weeks ago (2). The problem has been gradually worsening since onset. There has been no fever. Associated symptoms include coughing and sneezing. Pertinent negatives include no congestion or sore throat.       HENT:  Negative for congestion and sore throat.    Respiratory:  Positive for cough.    Allergic/Immunologic: Positive for sneezing.      Objective:     Physical Exam   Constitutional: She is oriented to person, place, and time. She appears well-developed. She is cooperative.  Non-toxic appearance. She does not appear ill. No distress.   HENT:   Head: Normocephalic and atraumatic.   Ears:   Right Ear: Hearing, external ear and ear canal normal. Tympanic membrane is scarred.   Left Ear: Hearing, external ear and ear canal normal. Tympanic membrane is scarred.   Nose: No mucosal edema, rhinorrhea or nasal deformity. No epistaxis. Right sinus exhibits frontal sinus tenderness. Right sinus exhibits no maxillary sinus tenderness. Left sinus exhibits frontal sinus tenderness. Left sinus exhibits no maxillary sinus tenderness.   Mouth/Throat: Uvula is midline, oropharynx is clear and moist and mucous membranes are normal. No trismus in the jaw. Normal dentition. No uvula swelling. No oropharyngeal exudate, posterior oropharyngeal edema or posterior oropharyngeal erythema.   Eyes: Conjunctivae and lids are normal. No scleral icterus.   Neck: Trachea normal and phonation normal. Neck " supple. No edema present. No erythema present. No neck rigidity present.   Cardiovascular: Normal rate, regular rhythm, normal heart sounds and normal pulses.   Pulmonary/Chest: Effort normal and breath sounds normal. No respiratory distress. She has no decreased breath sounds. She has no rhonchi.   Abdominal: Normal appearance.   Musculoskeletal: Normal range of motion.         General: No deformity. Normal range of motion.   Neurological: She is alert and oriented to person, place, and time. She exhibits normal muscle tone. Coordination normal.   Skin: Skin is warm, dry, intact, not diaphoretic and not pale.   Psychiatric: Her speech is normal and behavior is normal. Judgment and thought content normal.   Nursing note and vitals reviewed.      Assessment:     1. Seasonal allergic rhinitis, unspecified trigger        Plan:       Seasonal allergic rhinitis, unspecified trigger  -     azelastine (ASTELIN) 137 mcg (0.1 %) nasal spray; 1 spray (137 mcg total) by Nasal route 2 (two) times daily.  Dispense: 30 mL; Refill: 0  -     montelukast (SINGULAIR) 10 mg tablet; Take 1 tablet (10 mg total) by mouth every evening.  Dispense: 30 tablet; Refill: 0  -     methylPREDNISolone (MEDROL DOSEPACK) 4 mg tablet; use as directed  Dispense: 21 tablet; Refill: 0    Chronic with acute exacerbation  Meds as above  Continue allegra and flonase  Add neti pot sinus rinse with distilled water  Follow up with allergist if not improving with treatment  Patient states that she has taken medrol dose pack in the past and did well with it with no reaction

## 2025-03-04 ENCOUNTER — PATIENT MESSAGE (OUTPATIENT)
Dept: AUDIOLOGY | Facility: CLINIC | Age: 45
End: 2025-03-04
Payer: COMMERCIAL

## 2025-03-08 ENCOUNTER — OFFICE VISIT (OUTPATIENT)
Dept: URGENT CARE | Facility: CLINIC | Age: 45
End: 2025-03-08
Payer: COMMERCIAL

## 2025-03-08 VITALS
OXYGEN SATURATION: 97 % | BODY MASS INDEX: 26.8 KG/M2 | WEIGHT: 157 LBS | RESPIRATION RATE: 18 BRPM | HEART RATE: 80 BPM | TEMPERATURE: 99 F | DIASTOLIC BLOOD PRESSURE: 92 MMHG | SYSTOLIC BLOOD PRESSURE: 132 MMHG | HEIGHT: 64 IN

## 2025-03-08 DIAGNOSIS — R05.9 COUGH, UNSPECIFIED TYPE: ICD-10-CM

## 2025-03-08 DIAGNOSIS — J31.0 OTHER RHINITIS: Primary | ICD-10-CM

## 2025-03-08 PROCEDURE — 99213 OFFICE O/P EST LOW 20 MIN: CPT | Mod: S$GLB,,,

## 2025-03-08 RX ORDER — BENZONATATE 200 MG/1
200 CAPSULE ORAL 3 TIMES DAILY PRN
Qty: 21 CAPSULE | Refills: 0 | Status: SHIPPED | OUTPATIENT
Start: 2025-03-08 | End: 2025-03-15

## 2025-03-08 NOTE — PATIENT INSTRUCTIONS
PLEASE READ YOUR DISCHARGE INSTRUCTIONS ENTIRELY AS IT CONTAINS IMPORTANT INFORMATION.      - Please drink plenty of fluids.  - Please get plenty of rest.  - You can take plain Mucinex (guaifenesin) 1200 mg twice a day to help loosen mucous.   - Use over the counter Flonase as directed  Please return here or go to the Emergency Department for any concerns or worsening of condition.  - Please take an over the counter antihistamine medication (Allegra/Claritin/Zyrtec/Xyzal) of your choice as directed. These are antihistamines that can help with runny nose, nasal congestion, sneezing, and helps to dry up post-nasal drip, which usually causes sore throat and cough.    -If you do NOT have high blood pressure, you may use a decongestant form (D)  of this medication (ie. Claritin- D, zyrtec-D, allegra-D, Mucinex-D) or if you do not take the D form, you can take sudafed (pseudoephedrine) over the counter, which is a decongestant. Do NOT take two decongestant (D) medications at the same time (such as mucinex-D and claritin-D or plain sudafed and claritin D). Dextromethorphan (DM) is a cough suppressant over the counter (ie. mucinex DM, robitussin, delsym; dayquil/nyquil has DM as well.) Alahist DM is another decongestant that contains an antihistamine and cough suppressant.     If you do have Hypertension or palpitations, it is safe to take Coricidin HBP for relief of sinus symptoms.    - If not allergic, please take over the counter Tylenol (Acetaminophen) and/or Motrin (Ibuprofen) as directed for control of pain and/or fever.  Avoid tylenol if you have a history of liver disease. Do not take ibuprofen if you have a history of GI bleeding, kidney disease, or if you take blood thinners.  Please follow up with your primary care doctor or specialist as needed.    -IF YOU RECEIVED PRESCRIPTION COUGH SUPPRESSANTS: Take prescription cough meds (pills) as prescribed; take prescription cough syrup at night as needed for cough.  Do  not take both the prescribed cough pills and syrup at the same time or within 6 hours of each other.  Do not take the cough syrup with any other sedative medication as it can can cause drowsiness. Do not operate any heavy machinery, drink or drive while taking the cough syrup.    Try taking half a dose first of the cough syrup to see how it affects you.     Sore throat recommendations: Warm fluids, warm salt water gargles, throat lozenges, tea, honey, soup, rest, hydration.    Use over the counter flonase: one spray each nostril twice daily OR two sprays each nostril once daily for sinus congestion and postnasal drip. This is a steroid nasal spray that works locally over time to decrease the inflammation in your nose/sinuses and help with allergic symptoms. This is not an quick- relief spray like afrin, but it works well if used daily.  Discontinue if you develop nose bleed    Sinus rinses DO NOT USE TAP WATER, if you must, water must be a rolling boil for 1 minute, let it cool, then use.  May use distilled water, or over the counter nasal saline rinses.  Vics vapor rub in shower to help open nasal passages.  May use nasal gel to keep passages moisturized.  May use Nasal saline sprays during the day for added relief of congestion.   For those who go to the gym, please do not use the sauna or steam room now to clear sinuses.    If you  smoke, please stop smoking.      Please return or see your primary care doctor if you develop new or worsening symptoms.     Please arrange follow up with your primary medical clinic as soon as possible. You must understand that you've received an Urgent Care treatment only and that you may be released before all of your medical problems are known or treated. You, the patient, will arrange for follow up as instructed. If your symptoms worsen or fail to improve you should go to the Emergency Room.

## 2025-03-08 NOTE — PROGRESS NOTES
"Subjective:      Patient ID: Duane Romero is a 44 y.o. female.    Vitals:  height is 5' 4" (1.626 m) and weight is 71.2 kg (157 lb). Her tympanic temperature is 98.9 °F (37.2 °C). Her blood pressure is 132/92 (abnormal) and her pulse is 80. Her respiration is 18 and oxygen saturation is 97%.     Chief Complaint: Sinus Problem    44 year old female is presenting with c/o allergy problems. Associated symptoms include post nasal drip, cough, ear pressure, runny nose alternating with nasal congestion, watery eyes, itchy ears, and sneezing. Pt notes she has been dealing with symptoms for the past month, and explained how she suffers from environmental/seasonal allergies. Recently seen in clinic and given steroid pack which was ineffective. Treatments tried include allegra, and Flonase nasal spray. States running low on cough pearls which usually help her at night. Used to be on allergy shots "years ago for when my allergies were year round." Denies sinus pain, cp, sob, known history of asthma. Allergic to celestone, paraffin, pcn, prednisone.    Sinus Problem  This is a chronic problem. The current episode started more than 1 month ago. The problem is unchanged. There has been no fever. Her pain is at a severity of 2/10. The pain is mild. Associated symptoms include congestion, coughing, ear pain (itching), headaches, sinus pressure and sneezing. Pertinent negatives include no chills, diaphoresis or shortness of breath. Past treatments include saline nose sprays and nasal decongestants. The treatment provided no relief.       Constitution: Negative for chills, sweating and fever.   HENT:  Positive for ear pain (itching), congestion, postnasal drip and sinus pressure. Negative for ear discharge, trouble swallowing and voice change.    Neck: Negative for neck stiffness.   Cardiovascular:  Negative for chest pain.   Eyes:  Positive for eye discharge (watery) and eye itching. Negative for eye redness. "   Respiratory:  Positive for cough. Negative for shortness of breath.    Skin:  Negative for rash and hives.   Allergic/Immunologic: Positive for sneezing. Negative for hives.   Neurological:  Positive for headaches.      Objective:     Vitals:    03/08/25 1024   BP: (!) 132/92   Pulse: 80   Resp: 18   Temp: 98.9 °F (37.2 °C)       Physical Exam   Constitutional: She is oriented to person, place, and time. She appears well-developed. She is cooperative.  Non-toxic appearance. She does not appear ill. No distress.   HENT:   Head: Normocephalic and atraumatic.   Ears:   Right Ear: Hearing, external ear and ear canal normal. no impacted cerumen  Left Ear: Hearing, external ear and ear canal normal. no impacted cerumen  Nose: Rhinorrhea present. No mucosal edema or nasal deformity. No epistaxis. Right sinus exhibits no maxillary sinus tenderness and no frontal sinus tenderness. Left sinus exhibits no maxillary sinus tenderness and no frontal sinus tenderness.   Mouth/Throat: Uvula is midline, oropharynx is clear and moist and mucous membranes are normal. Mucous membranes are moist. No trismus in the jaw. Normal dentition. No uvula swelling. Cobblestoning present. No oropharyngeal exudate, posterior oropharyngeal edema or posterior oropharyngeal erythema.   Eyes: Conjunctivae and lids are normal. Pupils are equal, round, and reactive to light. Right eye exhibits no discharge. Left eye exhibits no discharge. No scleral icterus. Extraocular movement intact gaze aligned appropriately periorbital hyperpigmentation  Neck: Trachea normal and phonation normal. Neck supple. No edema present. No erythema present. No neck rigidity present.   Cardiovascular: Normal rate, regular rhythm, normal heart sounds and normal pulses.   Pulmonary/Chest: Effort normal and breath sounds normal. No accessory muscle usage or stridor. No tachypnea. No respiratory distress. She has no decreased breath sounds. She has no wheezes. She has no  rhonchi. She has no rales.   Abdominal: Normal appearance.   Musculoskeletal: Normal range of motion.         General: No deformity. Normal range of motion.   Neurological: She is alert and oriented to person, place, and time. She exhibits normal muscle tone. Coordination and gait normal.   Skin: Skin is warm, dry, intact, not diaphoretic, not pale and no rash.   Psychiatric: Her speech is normal and behavior is normal. Judgment and thought content normal.   Nursing note and vitals reviewed.      Assessment:     1. Other rhinitis    2. Cough, unspecified type        Plan:       Other rhinitis    Cough, unspecified type  -     benzonatate (TESSALON) 200 MG capsule; Take 1 capsule (200 mg total) by mouth 3 (three) times daily as needed for Cough.  Dispense: 21 capsule; Refill: 0        Patient Instructions   PLEASE READ YOUR DISCHARGE INSTRUCTIONS ENTIRELY AS IT CONTAINS IMPORTANT INFORMATION.      - Please drink plenty of fluids.  - Please get plenty of rest.  - You can take plain Mucinex (guaifenesin) 1200 mg twice a day to help loosen mucous.   - Use over the counter Flonase as directed  Please return here or go to the Emergency Department for any concerns or worsening of condition.  - Please take an over the counter antihistamine medication (Allegra/Claritin/Zyrtec/Xyzal) of your choice as directed. These are antihistamines that can help with runny nose, nasal congestion, sneezing, and helps to dry up post-nasal drip, which usually causes sore throat and cough.    -If you do NOT have high blood pressure, you may use a decongestant form (D)  of this medication (ie. Claritin- D, zyrtec-D, allegra-D, Mucinex-D) or if you do not take the D form, you can take sudafed (pseudoephedrine) over the counter, which is a decongestant. Do NOT take two decongestant (D) medications at the same time (such as mucinex-D and claritin-D or plain sudafed and claritin D). Dextromethorphan (DM) is a cough suppressant over the counter  (ie. mucinex DM, robitussin, delsym; dayquil/nyquil has DM as well.) Alahist DM is another decongestant that contains an antihistamine and cough suppressant.     If you do have Hypertension or palpitations, it is safe to take Coricidin HBP for relief of sinus symptoms.    - If not allergic, please take over the counter Tylenol (Acetaminophen) and/or Motrin (Ibuprofen) as directed for control of pain and/or fever.  Avoid tylenol if you have a history of liver disease. Do not take ibuprofen if you have a history of GI bleeding, kidney disease, or if you take blood thinners.  Please follow up with your primary care doctor or specialist as needed.    -IF YOU RECEIVED PRESCRIPTION COUGH SUPPRESSANTS: Take prescription cough meds (pills) as prescribed; take prescription cough syrup at night as needed for cough.  Do not take both the prescribed cough pills and syrup at the same time or within 6 hours of each other.  Do not take the cough syrup with any other sedative medication as it can can cause drowsiness. Do not operate any heavy machinery, drink or drive while taking the cough syrup.    Try taking half a dose first of the cough syrup to see how it affects you.     Sore throat recommendations: Warm fluids, warm salt water gargles, throat lozenges, tea, honey, soup, rest, hydration.    Use over the counter flonase: one spray each nostril twice daily OR two sprays each nostril once daily for sinus congestion and postnasal drip. This is a steroid nasal spray that works locally over time to decrease the inflammation in your nose/sinuses and help with allergic symptoms. This is not an quick- relief spray like afrin, but it works well if used daily.  Discontinue if you develop nose bleed    Sinus rinses DO NOT USE TAP WATER, if you must, water must be a rolling boil for 1 minute, let it cool, then use.  May use distilled water, or over the counter nasal saline rinses.  Vics vapor rub in shower to help open nasal passages.   May use nasal gel to keep passages moisturized.  May use Nasal saline sprays during the day for added relief of congestion.   For those who go to the gym, please do not use the sauna or steam room now to clear sinuses.    If you  smoke, please stop smoking.      Please return or see your primary care doctor if you develop new or worsening symptoms.     Please arrange follow up with your primary medical clinic as soon as possible. You must understand that you've received an Urgent Care treatment only and that you may be released before all of your medical problems are known or treated. You, the patient, will arrange for follow up as instructed. If your symptoms worsen or fail to improve you should go to the Emergency Room.

## 2025-03-23 ENCOUNTER — OFFICE VISIT (OUTPATIENT)
Dept: URGENT CARE | Facility: CLINIC | Age: 45
End: 2025-03-23
Payer: COMMERCIAL

## 2025-03-23 VITALS
WEIGHT: 157 LBS | BODY MASS INDEX: 26.8 KG/M2 | SYSTOLIC BLOOD PRESSURE: 140 MMHG | TEMPERATURE: 99 F | OXYGEN SATURATION: 96 % | DIASTOLIC BLOOD PRESSURE: 71 MMHG | RESPIRATION RATE: 14 BRPM | HEIGHT: 64 IN | HEART RATE: 103 BPM

## 2025-03-23 DIAGNOSIS — L30.9 DERMATITIS: Primary | ICD-10-CM

## 2025-03-23 PROCEDURE — 99213 OFFICE O/P EST LOW 20 MIN: CPT | Mod: S$GLB,,, | Performed by: PHYSICIAN ASSISTANT

## 2025-03-23 RX ORDER — TRIAMCINOLONE ACETONIDE 1 MG/G
OINTMENT TOPICAL 2 TIMES DAILY
Qty: 45 G | Refills: 0 | Status: SHIPPED | OUTPATIENT
Start: 2025-03-23 | End: 2025-04-02

## 2025-03-23 NOTE — PATIENT INSTRUCTIONS
Triamcinolone ointment 2x daily. May use nonstick bandage over. Gentle cream such as CERAVE one-2 x daily. Avoid hot baths or showers.     Follow up with dermatologist if not improving within 10 days.

## 2025-03-23 NOTE — PROGRESS NOTES
"Subjective:      Patient ID: Duane Romero is a 44 y.o. female.    Vitals:  height is 5' 4" (1.626 m) and weight is 71.2 kg (157 lb). Her tympanic temperature is 98.5 °F (36.9 °C). Her blood pressure is 140/71 (abnormal) and her pulse is 103. Her respiration is 14 and oxygen saturation is 96%.     Chief Complaint: Rash    Onset of sxs 03/20/25. Pt is c/o a discolored rash on both elbows. Pt does have hx of eczema. Pt applied cetaphil eczema cream to the rash over the last 3 days. Pt is now experiencing flushed areas on both arms.  Doesn't normally have eczema to arms, just face and ears. Denies any new products or detergents     Rash  This is a new problem. The current episode started in the past 7 days. The problem is unchanged. The affected locations include the left elbow and right elbow. The rash is characterized by redness, bruising and itchiness. She was exposed to a new medication. Past treatments include anti-itch cream. The treatment provided no relief.       Skin:  Positive for rash.      Objective:     Physical Exam   Constitutional: She is oriented to person, place, and time. She appears well-developed. She is cooperative.  Non-toxic appearance. She does not appear ill. No distress.   HENT:   Head: Normocephalic and atraumatic.   Ears:   Right Ear: Hearing, tympanic membrane, external ear and ear canal normal.   Left Ear: Hearing, tympanic membrane, external ear and ear canal normal.   Nose: Nose normal. No mucosal edema, rhinorrhea or nasal deformity. No epistaxis. Right sinus exhibits no maxillary sinus tenderness and no frontal sinus tenderness. Left sinus exhibits no maxillary sinus tenderness and no frontal sinus tenderness.   Mouth/Throat: Uvula is midline, oropharynx is clear and moist and mucous membranes are normal. No trismus in the jaw. Normal dentition. No uvula swelling. No posterior oropharyngeal erythema.   Eyes: Conjunctivae and lids are normal. Right eye exhibits no discharge. " Left eye exhibits no discharge. No scleral icterus.   Neck: Trachea normal and phonation normal. Neck supple.   Cardiovascular: Normal rate, regular rhythm, normal heart sounds and normal pulses.   Pulmonary/Chest: Effort normal and breath sounds normal. No respiratory distress.   Abdominal: Normal appearance and bowel sounds are normal. She exhibits no distension and no mass. Soft. There is no abdominal tenderness.   Musculoskeletal: Normal range of motion.         General: No deformity. Normal range of motion.   Neurological: She is alert and oriented to person, place, and time. She exhibits normal muscle tone. Coordination normal.   Skin: Skin is warm, dry, intact, not diaphoretic and not pale.        Psychiatric: Her speech is normal and behavior is normal. Judgment and thought content normal.   Nursing note and vitals reviewed.      Assessment:     1. Dermatitis        Plan:       Dermatitis  -     triamcinolone acetonide 0.1% (KENALOG) 0.1 % ointment; Apply topically 2 (two) times daily. for 10 days  Dispense: 45 g; Refill: 0    Viviana Butts PA-C  [facility]       Patient Instructions   Triamcinolone ointment 2x daily. May use nonstick bandage over. Gentle cream such as CERAVE one-2 x daily. Avoid hot baths or showers.     Follow up with dermatologist if not improving within 10 days.

## 2025-03-24 ENCOUNTER — TELEPHONE (OUTPATIENT)
Dept: INTERNAL MEDICINE | Facility: CLINIC | Age: 45
End: 2025-03-24
Payer: COMMERCIAL

## 2025-03-24 ENCOUNTER — OFFICE VISIT (OUTPATIENT)
Dept: INTERNAL MEDICINE | Facility: CLINIC | Age: 45
End: 2025-03-24
Payer: COMMERCIAL

## 2025-03-24 VITALS
WEIGHT: 158.06 LBS | OXYGEN SATURATION: 99 % | HEART RATE: 103 BPM | HEIGHT: 64 IN | DIASTOLIC BLOOD PRESSURE: 78 MMHG | BODY MASS INDEX: 26.98 KG/M2 | SYSTOLIC BLOOD PRESSURE: 126 MMHG | TEMPERATURE: 97 F

## 2025-03-24 DIAGNOSIS — L20.9 ATOPIC DERMATITIS, UNSPECIFIED TYPE: Primary | ICD-10-CM

## 2025-03-24 PROCEDURE — 99999 PR PBB SHADOW E&M-EST. PATIENT-LVL IV: CPT | Mod: PBBFAC,,, | Performed by: PHYSICIAN ASSISTANT

## 2025-03-24 PROCEDURE — 3008F BODY MASS INDEX DOCD: CPT | Mod: CPTII,S$GLB,, | Performed by: PHYSICIAN ASSISTANT

## 2025-03-24 PROCEDURE — 1159F MED LIST DOCD IN RCRD: CPT | Mod: CPTII,S$GLB,, | Performed by: PHYSICIAN ASSISTANT

## 2025-03-24 PROCEDURE — 1160F RVW MEDS BY RX/DR IN RCRD: CPT | Mod: CPTII,S$GLB,, | Performed by: PHYSICIAN ASSISTANT

## 2025-03-24 PROCEDURE — 99213 OFFICE O/P EST LOW 20 MIN: CPT | Mod: S$GLB,,, | Performed by: PHYSICIAN ASSISTANT

## 2025-03-24 PROCEDURE — 3078F DIAST BP <80 MM HG: CPT | Mod: CPTII,S$GLB,, | Performed by: PHYSICIAN ASSISTANT

## 2025-03-24 PROCEDURE — 3074F SYST BP LT 130 MM HG: CPT | Mod: CPTII,S$GLB,, | Performed by: PHYSICIAN ASSISTANT

## 2025-03-24 NOTE — TELEPHONE ENCOUNTER
----- Message from Jeremias sent at 3/24/2025  1:44 PM CDT -----  Contact: self  call back [phone] the pt states she might be 5 mins late.

## 2025-03-24 NOTE — PROGRESS NOTES
"Subjective:       Patient ID: Duane Romero is a 44 y.o. female.    Chief Complaint: Rash      History of Present Illness    CHIEF COMPLAINT:  Ms. Romero presents today for follow-up on a skin rash.    SKIN CONCERNS:  She reports a rash that initially appeared on her knees and subsequently spread to her arm. The rash is described as red, with persistent involvement of the knees throughout the day. The rash progression began on Thursday, with significant spread of redness by Sunday. She experiences intermittent pruritus. The appearance is reportedly similar to her eczema.    ALLERGIES AND ENT HISTORY:  She has allergies to pollen, weeds, ligustrums, and grass. She has significant allergy-related hearing impairment requiring hearing aids. Her history is notable for 13 sets of ear tubes due to allergy-induced ear drainage causing tympanic membrane perforations. She has an upcoming allergy appointment scheduled during Easter break for allergen retesting and evaluation of potential new allergies.    FAMILY HISTORY:  She has a family history of eczema and psoriasis.    MEDICATIONS:  She is currently prescribed amphetamines.    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         Review of Systems   Constitutional:  Negative for chills, fatigue, fever and unexpected weight change.   Eyes:  Negative for visual disturbance.   Respiratory:  Negative for shortness of breath.    Cardiovascular:  Negative for chest pain.   Musculoskeletal:  Negative for myalgias.   Skin:  Positive for rash.   Neurological:  Negative for headaches.         Objective:     Vitals:    03/24/25 1415 03/24/25 1447   BP: (!) 140/91 126/78   BP Location:  Left arm   Patient Position:  Sitting   Pulse: 107 103   Temp: 97.2 °F (36.2 °C)    TempSrc: Tympanic    SpO2: 99%    Weight: 71.7 kg (158 lb 1.1 oz)    Height: 5' 4" (1.626 m)     "      Physical Exam  Vitals and nursing note reviewed.   Constitutional:       General: She is not in acute distress.     Appearance: She is well-developed.   HENT:      Head: Normocephalic and atraumatic.   Eyes:      General: Lids are normal. No scleral icterus.     Extraocular Movements: Extraocular movements intact.      Conjunctiva/sclera: Conjunctivae normal.   Pulmonary:      Effort: Pulmonary effort is normal.   Skin:         Neurological:      Mental Status: She is alert.      Cranial Nerves: No cranial nerve deficit.   Psychiatric:         Mood and Affect: Mood and affect normal.           Assessment:     1. Atopic dermatitis, unspecified type          Plan:   1. Atopic dermatitis, unspecified type        Assessment & Plan    IMPRESSION:  - Assessed rash on elbows, considering differential diagnoses of contact dermatitis, eczema (atopic dermatitis), and psoriasis.  - Determined rash presentation more consistent with eczema or contact dermatitis rather than psoriasis based on appearance and history.  - Considered possible allergic reaction to environmental factors (e.g., pollen) or recent use of Cetaphil product.    PATIENT EDUCATION:  - Explained differences between eczema, contact dermatitis, and psoriasis.  - Discussed that steroid ointments are often an effective initial treatment for various skin conditions.    ACTION ITEMS/LIFESTYLE:  - Ms. Romero to be mindful of changes in soap, lotions, and detergents that could potentially worsen skin condition.  - Recommend using unscented products.  - Ms. Romero to avoid high fructose corn syrup and refined sugars, which can exacerbate eczema and allergies.  - Recommend considering alternatives to Cetaphil, such as Aveeno, if concerned about a reaction.    MEDICATIONS:  - Started triamcinolone ointment: Apply twice daily for 1-2 weeks to affected areas. Recommend as initial treatment, effective for both eczema and potential psoriasis.    REFERRALS:  -  Advised dermatology follow-up if symptoms persist after initial treatment with triamcinolone ointment.           Future Appointments   Date Time Provider Department Center   4/21/2025 11:45 AM Amol Reinoso MD ON ORTHO  [facility]   4/25/2025  3:30 PM Madeline Persaud MD [facility] ALLERGY [facility] [place]

## 2025-03-25 ENCOUNTER — TELEPHONE (OUTPATIENT)
Dept: INTERNAL MEDICINE | Facility: CLINIC | Age: 45
End: 2025-03-25
Payer: COMMERCIAL

## 2025-03-25 NOTE — TELEPHONE ENCOUNTER
----- Message from Adriana Rosas PA-C sent at 3/25/2025  7:46 AM CDT -----  Please contact patient to get annual physical with Dr. ANIKET LEPE

## 2025-04-24 PROBLEM — F41.1 GAD (GENERALIZED ANXIETY DISORDER): Status: ACTIVE | Noted: 2025-04-24

## 2025-04-24 PROBLEM — F90.2 ATTENTION DEFICIT HYPERACTIVITY DISORDER (ADHD), COMBINED TYPE: Status: ACTIVE | Noted: 2025-04-24

## 2025-04-25 ENCOUNTER — OFFICE VISIT (OUTPATIENT)
Dept: ORTHOPEDICS | Facility: CLINIC | Age: 45
End: 2025-04-25
Payer: COMMERCIAL

## 2025-04-25 VITALS — WEIGHT: 158.06 LBS | HEIGHT: 64 IN | BODY MASS INDEX: 26.98 KG/M2

## 2025-04-25 DIAGNOSIS — G56.03 BILATERAL CARPAL TUNNEL SYNDROME: Primary | ICD-10-CM

## 2025-04-25 DIAGNOSIS — M77.12 LATERAL EPICONDYLITIS OF LEFT ELBOW: ICD-10-CM

## 2025-04-25 DIAGNOSIS — Z01.818 PREOPERATIVE EXAMINATION: ICD-10-CM

## 2025-04-25 PROCEDURE — 99999 PR PBB SHADOW E&M-EST. PATIENT-LVL III: CPT | Mod: PBBFAC,,, | Performed by: ORTHOPAEDIC SURGERY

## 2025-04-25 RX ORDER — TRIAMCINOLONE ACETONIDE 40 MG/ML
40 INJECTION, SUSPENSION INTRA-ARTICULAR; INTRAMUSCULAR
Status: SHIPPED | OUTPATIENT
Start: 2025-04-25

## 2025-04-25 RX ADMIN — TRIAMCINOLONE ACETONIDE 40 MG: 40 INJECTION, SUSPENSION INTRA-ARTICULAR; INTRAMUSCULAR at 11:04

## 2025-04-25 NOTE — PROCEDURES
Tendon Origin: L elbow    Date/Time: 4/25/2025 11:45 AM    Performed by: Amol Reinoso MD  Authorized by: Amol Reinoso MD    Consent Done?:  Yes (Verbal)  Timeout: prior to procedure the correct patient, procedure, and site was verified    Indications:  Pain  Timeout: prior to procedure the correct patient, procedure, and site was verified    Location:  Elbow  Site:  L elbow  Prep: patient was prepped and draped in usual sterile fashion    Needle size:  25 G  Approach:  Anterolateral  Medications:  40 mg triamcinolone acetonide 40 mg/mL

## 2025-04-25 NOTE — PROCEDURES
Carpal Tunnel: L carpal tunnel    Date/Time: 4/25/2025 11:45 AM    Performed by: Amol Reinoso MD  Authorized by: Amol Reinoso MD    Consent Done?:  Yes (Verbal)  Indications:  Pain  Timeout: prior to procedure the correct patient, procedure, and site was verified    Prep: patient was prepped and draped in usual sterile fashion      Local anesthesia used?: Yes    Local anesthetic:  Lidocaine 2% without epinephrine  Anesthetic total (ml):  1    Location:  Wrist  Site:  L carpal tunnel  Ultrasonic Guidance for Needle Placement?: No    Needle size:  25 G  Approach:  Volar  Medications:  40 mg triamcinolone acetonide 40 mg/mL

## 2025-04-25 NOTE — PROGRESS NOTES
Subjective:     Patient ID: Duane Romero is a 44 y.o. female.    Chief Complaint: Pain of the Right Hand, Pain of the Left Hand, Pain of the Left Elbow, and Pain of the Right Elbow      HPI:  The patient is a 44-year-old female with bilateral carpal tunnel syndrome last injected 2025 with good relief until recently.  She wishes to have a left carpal tunnel release.  She did have positive nerve conduction studies.  She wishes left elbow lateral epicondylitis injection today as well as left carpal tunnel injection today.  She was last injected 2025 with good relief until recently.    Past Medical History:   Diagnosis Date    Allergy     Asthma     Hyperlipidemia     Otitis media     Prediabetes     Sinusitis      Past Surgical History:   Procedure Laterality Date    ADENOIDECTOMY       SECTION      CYST REMOVAL Right     Under right arm    DILATION AND CURETTAGE OF UTERUS      HERNIA REPAIR  2022    HYSTERECTOMY  2021    FRANCO/BS    IMPLANTATION OF BONE ANCHORED HEARING AID (BAHA) Bilateral 2022    Procedure: INSERTION, BAHA;  Surgeon: Jeff Kelley MD;  Location: [facility] OR;  Service: ENT;  Laterality: Bilateral;    TONSILLECTOMY      TUBAL LIGATION      TYMPANOPLASTY      TYMPANOSTOMY TUBE PLACEMENT      UMBILICAL HERNIA REPAIR N/A 2022    Procedure: REPAIR, HERNIA, UMBILICAL, AGE 5 YEARS OR OLDER;  Surgeon: Philipp Paiz MD;  Location: [facility] OR;  Service: General;  Laterality: N/A;     Family History   Problem Relation Name Age of Onset    Hyperlipidemia Mother Mom     Diabetes Father Father         DM2 insulin pump & oral    Hypertension Father Father     Obesity Father Father     Breast cancer Maternal Aunt Aunt     Cancer Maternal Aunt Aunt         Skin cancer    Thyroid disease Maternal Grandmother Rima alcon     Migraines Neg Hx      Asthma Neg Hx       Social History[1]  Medication List with Changes/Refills   Current Medications     AZELASTINE (ASTELIN) 137 MCG (0.1 %) NASAL SPRAY    1 spray (137 mcg total) by Nasal route 2 (two) times daily.    DEXTROAMPHETAMINE-AMPHETAMINE (ADDERALL XR) 30 MG 24 HR CAPSULE    Take by mouth every morning.    EPINEPHRINE (AUVI-Q) 0.3 MG/0.3 ML ATIN    Inject 0.3 mLs (0.3 mg total) into the muscle once. for 1 dose    FEXOFENADINE (ALLEGRA) 60 MG TABLET    Take 60 mg by mouth once daily.    FLUTICASONE PROPIONATE (FLONASE) 50 MCG/ACTUATION NASAL SPRAY    USE 1 SPRAY (50 MCG TOTAL) IN EACH NOSTRIL ONCE DAILY    MONTELUKAST (SINGULAIR) 10 MG TABLET    TAKE 1 TABLET BY MOUTH EVERY DAY IN THE EVENING    TRIAMCINOLONE ACETONIDE 0.1% (KENALOG) 0.1 % OINTMENT    Apply topically 2 (two) times daily. for 10 days     Review of patient's allergies indicates:   Allergen Reactions    Celestone [betamethasone]      Allergy testing.    Paraffin     Pcn [penicillins] Itching     Injection only.  Can tolerate oral form    Prednisone      Other reaction(s): increased heart rate     Review of Systems   Constitutional: Negative for malaise/fatigue.   HENT:  Positive for hearing loss.    Eyes:  Negative for double vision and visual disturbance.   Cardiovascular:  Positive for dyspnea on exertion and palpitations. Negative for chest pain.   Respiratory:  Negative for shortness of breath.    Endocrine: Negative for cold intolerance.   Hematologic/Lymphatic: Does not bruise/bleed easily.   Skin:  Negative for poor wound healing and suspicious lesions.   Musculoskeletal:  Negative for gout, joint pain and joint swelling.   Gastrointestinal:  Negative for nausea and vomiting.   Genitourinary:  Negative for dysuria.   Neurological:  Positive for numbness, paresthesias and sensory change.   Psychiatric/Behavioral:  Positive for altered mental status. Negative for depression, memory loss and substance abuse. The patient is nervous/anxious.    Allergic/Immunologic: Negative for persistent infections.       Objective:   Body mass index is  27.13 kg/m².  There were no vitals filed for this visit.             General    Constitutional: She is oriented to person, place, and time. She appears well-developed and well-nourished. No distress.   HENT:   Head: Normocephalic. Mouth/Throat: Oropharynx is clear and moist.   Eyes: EOM are normal.   Cardiovascular:  Normal rate.            Pulmonary/Chest: Effort normal.   Abdominal: Soft.   Neurological: She is alert and oriented to person, place, and time. No cranial nerve deficit.   Psychiatric: She has a normal mood and affect.             Right Hand/Wrist Exam     Inspection   Scars: Wrist - absent Hand -  absent  Effusion: Wrist - absent Hand -  absent    Pain   Wrist - The patient exhibits pain of the flexor/pronator group.    Tests   Phalens sign: positive  Tinel's sign (median nerve): positive  Carpal Tunnel Compression Test: positive    Atrophy   Thenar:  negative  Intrinsic:  negative    Other     Neuorologic Exam    Median Distribution: abnormal  Ulnar Distribution: normal  Radial Distribution: normal    Comments:  The patient has a positive Tinel and positive Phalen sign.  There is no thenar atrophy noted.      Left Hand/Wrist Exam     Inspection   Scars: Wrist - absent Hand -  absent  Effusion: Wrist - absent Hand -  absent    Pain   Wrist - The patient exhibits pain of the flexor/pronator group.    Tests   Phalens sign: positive  Tinel's sign (median nerve): positive  Carpal Tunnel Compression Test: positive    Atrophy  Thenar:  Negative  Intrinsic: negative    Other     Sensory Exam  Median Distribution: abnormal  Ulnar Distribution: normal  Radial Distribution: normal    Comments:  The patient has a positive Tinel and positive Phalen sign.  There is no thenar atrophy noted.      Left Elbow Exam     Inspection   Scars: absent  Effusion: absent    Pain   The patient exhibits pain of the lateral epicondyle    Comments:  The patient has tenderness left elbow lateral epicondyle with pain on resisted  wrist and finger extension.        Vascular Exam       Capillary Refill  Right Hand: normal capillary refill  Left Hand: normal capillary refill       radiographs were not obtained today  Assessment:     Encounter Diagnosis   Name Primary?    Bilateral carpal tunnel syndrome Yes        Plan:       The patient was counseled regarding a left carpal tunnel release.  Risk complications and alternatives were discussed including the risk of infection, anesthetic risk, injury to nerves and vessels, loss of motion, and possible need for additional surgeries were discussed.  She seems to understand and agree to that surgery.  All questions were answered.  The patient was injected left carpal tunnel with 1 cc Kenalog and 1 cc 2% plain lidocaine under sterile technique.  She was injected left elbow lateral epicondyle with 1 cc Kenalog and 1 cc 2% plain lidocaine under sterile technique.  She will wait at least 3 months between injections.              Disclaimer: This note was prepared using a voice recognition system and is likely to have sound alike errors within the text.            [1]  Social History  Socioeconomic History    Marital status:    Tobacco Use    Smoking status: Never    Smokeless tobacco: Never   Substance and Sexual Activity    Alcohol use: Not Currently     Comment: Occas. last drink on 06/22/2022    Drug use: Never    Sexual activity: Not Currently     Partners: Male     Birth control/protection: None     Comment: I had a partial hysterectomy 5-   Social History Narrative    Patient goal(s): Weight of 130 lbs    Motivation: Stay healthy (prevent dz/need for rx) to enjoy outdoor activities with family or by herself    Breakfast: 1-2 sausages & 1-2 eggs with 1 slice bread; water & coffee w/ Stevia & sugar-free creamer    Lunch: Boneless chicken wings & 1 thin slice of cheesecake or 1 turkey sandwich on white bread, 1 slice of American cheese; water    Dinner: Hamburger (90:10 or 80:20  meat) with cheese & bread or chicken; water or wine 5.5 oz    Snacks: Apples or 1-2 small bag of chips daily (160 jamaal)    Eating out: 3-4x/wk    Water (oz/day): 102 oz/d    Physical Activity: 35-50 min/d of resistance/aerobic & 4-5d/wk     Strategies: Lose it radha (smaller portions; lower carb)     Social Drivers of Health     Financial Resource Strain: Patient Declined (2/22/2024)    Overall Financial Resource Strain (CARDIA)     Difficulty of Paying Living Expenses: Patient declined   Food Insecurity: No Food Insecurity (2/22/2024)    Hunger Vital Sign     Worried About Running Out of Food in the Last Year: Never true     Ran Out of Food in the Last Year: Never true   Transportation Needs: No Transportation Needs (2/22/2024)    PRAPARE - Transportation     Lack of Transportation (Medical): No     Lack of Transportation (Non-Medical): No   Physical Activity: Unknown (2/22/2024)    Exercise Vital Sign     Days of Exercise per Week: 7 days   Stress: No Stress Concern Present (2/22/2024)    [employer] - Occupational Stress Questionnaire     Feeling of Stress : Not at all   Housing Stability: Low Risk  (2/22/2024)    Housing Stability Vital Sign     Unable to Pay for Housing in the Last Year: No     Number of Places Lived in the Last Year: 2     Unstable Housing in the Last Year: No

## 2025-04-28 ENCOUNTER — PATIENT OUTREACH (OUTPATIENT)
Dept: ADMINISTRATIVE | Facility: HOSPITAL | Age: 45
End: 2025-04-28
Payer: COMMERCIAL

## 2025-04-28 ENCOUNTER — TELEPHONE (OUTPATIENT)
Dept: INTERNAL MEDICINE | Facility: CLINIC | Age: 45
End: 2025-04-28
Payer: COMMERCIAL

## 2025-04-28 DIAGNOSIS — N89.8 VAGINAL IRRITATION: Primary | ICD-10-CM

## 2025-04-28 NOTE — PROGRESS NOTES
Working mammogram report; Chart searched; Called pt, states she will schedule at [facility].

## 2025-04-28 NOTE — TELEPHONE ENCOUNTER
----- Message from Jeremias sent at 4/28/2025  1:58 PM CDT -----  Contact: self  Type:  Needs Medical AdviceWho Called: Duane Jaraymptoms (please be specific): irritation outside, not a UTIWould the patient rather a call back or a response via [facility]? Call [facility] Call Back Number: [phone] Information: the patient has questions and wants to know what she can do for the irritation.

## 2025-04-28 NOTE — TELEPHONE ENCOUNTER
Spoke with pt, she stated she has been having a lot of irritation in her private area. Right on the outside of labia. Pt stated it is not itching. Not having burning when urinating. Just all the time irritated since recent sexual activity. Pt wants to know if there is a cream or anything she could get to help with irritation. Please review and advise.     LV 03/24/2025 Adriana

## 2025-04-28 NOTE — H&P
Subjective:     Patient ID: Duane Romero is a 44 y.o. female.    Chief Complaint: No chief complaint on file.      HPI:  The patient is a 44-year-old female with bilateral carpal tunnel syndrome last injected 2025 with good relief until recently.  She wishes to have a left carpal tunnel release.  She did have positive nerve conduction studies.  She wishes left elbow lateral epicondylitis injection today as well as left carpal tunnel injection today.  She was last injected 2025 with good relief until recently.    Past Medical History:   Diagnosis Date    Allergy     Asthma     Hyperlipidemia     Otitis media     Prediabetes     Sinusitis      Past Surgical History:   Procedure Laterality Date    ADENOIDECTOMY       SECTION      CYST REMOVAL Right     Under right arm    DILATION AND CURETTAGE OF UTERUS      HERNIA REPAIR  2022    HYSTERECTOMY  2021    FRANCO/BS    IMPLANTATION OF BONE ANCHORED HEARING AID (BAHA) Bilateral 2022    Procedure: INSERTION, BAHA;  Surgeon: Jeff Kelley MD;  Location: [facility] OR;  Service: ENT;  Laterality: Bilateral;    TONSILLECTOMY      TUBAL LIGATION      TYMPANOPLASTY      TYMPANOSTOMY TUBE PLACEMENT      UMBILICAL HERNIA REPAIR N/A 2022    Procedure: REPAIR, HERNIA, UMBILICAL, AGE 5 YEARS OR OLDER;  Surgeon: Philipp Paiz MD;  Location: [facility] OR;  Service: General;  Laterality: N/A;     Family History   Problem Relation Name Age of Onset    Hyperlipidemia Mother Mom     Diabetes Father Father         DM2 insulin pump & oral    Hypertension Father Father     Obesity Father Father     Breast cancer Maternal Aunt Aunt     Cancer Maternal Aunt Aunt         Skin cancer    Thyroid disease Maternal Grandmother Rima alcon     Migraines Neg Hx      Asthma Neg Hx       Social History[1]  Medication List with Changes/Refills   Current Medications    AZELASTINE (ASTELIN) 137 MCG (0.1 %) NASAL SPRAY    1 spray (137 mcg total) by Nasal route 2  (two) times daily.    DEXTROAMPHETAMINE-AMPHETAMINE (ADDERALL XR) 30 MG 24 HR CAPSULE    Take by mouth every morning.    EPINEPHRINE (AUVI-Q) 0.3 MG/0.3 ML ATIN    Inject 0.3 mLs (0.3 mg total) into the muscle once. for 1 dose    FEXOFENADINE (ALLEGRA) 60 MG TABLET    Take 60 mg by mouth once daily.    FLUTICASONE PROPIONATE (FLONASE) 50 MCG/ACTUATION NASAL SPRAY    USE 1 SPRAY (50 MCG TOTAL) IN EACH NOSTRIL ONCE DAILY    MONTELUKAST (SINGULAIR) 10 MG TABLET    TAKE 1 TABLET BY MOUTH EVERY DAY IN THE EVENING    TRIAMCINOLONE ACETONIDE 0.1% (KENALOG) 0.1 % OINTMENT    Apply topically 2 (two) times daily. for 10 days     Review of patient's allergies indicates:   Allergen Reactions    Celestone [betamethasone]      Allergy testing.    Paraffin     Pcn [penicillins] Itching     Injection only.  Can tolerate oral form    Prednisone      Other reaction(s): increased heart rate     Review of Systems   Constitutional: Negative for malaise/fatigue.   HENT:  Positive for hearing loss.    Eyes:  Negative for double vision and visual disturbance.   Cardiovascular:  Positive for dyspnea on exertion and palpitations. Negative for chest pain.   Respiratory:  Negative for shortness of breath.    Endocrine: Negative for cold intolerance.   Hematologic/Lymphatic: Does not bruise/bleed easily.   Skin:  Negative for poor wound healing and suspicious lesions.   Musculoskeletal:  Negative for gout, joint pain and joint swelling.   Gastrointestinal:  Negative for nausea and vomiting.   Genitourinary:  Negative for dysuria.   Neurological:  Positive for numbness, paresthesias and sensory change.   Psychiatric/Behavioral:  Positive for altered mental status. Negative for depression, memory loss and substance abuse. The patient is nervous/anxious.    Allergic/Immunologic: Negative for persistent infections.       Objective:   There is no height or weight on file to calculate BMI.  There were no vitals filed for this visit.              General    Constitutional: She is oriented to person, place, and time. She appears well-developed and well-nourished. No distress.   HENT:   Head: Normocephalic. Mouth/Throat: Oropharynx is clear and moist.   Eyes: EOM are normal.   Cardiovascular:  Normal rate.            Pulmonary/Chest: Effort normal.   Abdominal: Soft.   Neurological: She is alert and oriented to person, place, and time. No cranial nerve deficit.   Psychiatric: She has a normal mood and affect.             Right Hand/Wrist Exam     Inspection   Scars: Wrist - absent Hand -  absent  Effusion: Wrist - absent Hand -  absent    Pain   Wrist - The patient exhibits pain of the flexor/pronator group.    Tests   Phalens sign: positive  Tinel's sign (median nerve): positive  Carpal Tunnel Compression Test: positive    Atrophy   Thenar:  negative  Intrinsic:  negative    Other     Neuorologic Exam    Median Distribution: abnormal  Ulnar Distribution: normal  Radial Distribution: normal    Comments:  The patient has a positive Tinel and positive Phalen sign.  There is no thenar atrophy noted.      Left Hand/Wrist Exam     Inspection   Scars: Wrist - absent Hand -  absent  Effusion: Wrist - absent Hand -  absent    Pain   Wrist - The patient exhibits pain of the flexor/pronator group.    Tests   Phalens sign: positive  Tinel's sign (median nerve): positive  Carpal Tunnel Compression Test: positive    Atrophy  Thenar:  Negative  Intrinsic: negative    Other     Sensory Exam  Median Distribution: abnormal  Ulnar Distribution: normal  Radial Distribution: normal    Comments:  The patient has a positive Tinel and positive Phalen sign.  There is no thenar atrophy noted.      Left Elbow Exam     Inspection   Scars: absent  Effusion: absent    Pain   The patient exhibits pain of the lateral epicondyle    Comments:  The patient has tenderness left elbow lateral epicondyle with pain on resisted wrist and finger extension.        Vascular Exam        Capillary Refill  Right Hand: normal capillary refill  Left Hand: normal capillary refill         radiographs were not obtained today  Assessment:     Encounter Diagnosis   Name Primary?    Bilateral carpal tunnel syndrome Yes        Plan:       The patient was counseled regarding a left carpal tunnel release.  Risk complications and alternatives were discussed including the risk of infection, anesthetic risk, injury to nerves and vessels, loss of motion, and possible need for additional surgeries were discussed.  She seems to understand and agree to that surgery.  All questions were answered.  The patient was injected left carpal tunnel with 1 cc Kenalog and 1 cc 2% plain lidocaine under sterile technique.  She was injected left elbow lateral epicondyle with 1 cc Kenalog and 1 cc 2% plain lidocaine under sterile technique.  She will wait at least 3 months between injections.              Disclaimer: This note was prepared using a voice recognition system and is likely to have sound alike errors within the text.          [1]   Social History  Socioeconomic History    Marital status:    Tobacco Use    Smoking status: Never    Smokeless tobacco: Never   Substance and Sexual Activity    Alcohol use: Not Currently     Comment: Occas. last drink on 06/22/2022    Drug use: Never    Sexual activity: Not Currently     Partners: Male     Birth control/protection: None     Comment: I had a partial hysterectomy 5-   Social History Narrative    Patient goal(s): Weight of 130 lbs    Motivation: Stay healthy (prevent dz/need for rx) to enjoy outdoor activities with family or by herself    Breakfast: 1-2 sausages & 1-2 eggs with 1 slice bread; water & coffee w/ Stevia & sugar-free creamer    Lunch: Boneless chicken wings & 1 thin slice of cheesecake or 1 turkey sandwich on white bread, 1 slice of American cheese; water    Dinner: Hamburger (90:10 or 80:20 meat) with cheese & bread or chicken; water or wine 5.5  oz    Snacks: Apples or 1-2 small bag of chips daily (160 jamaal)    Eating out: 3-4x/wk    Water (oz/day): 102 oz/d    Physical Activity: 35-50 min/d of resistance/aerobic & 4-5d/wk     Strategies: Lose it radha (smaller portions; lower carb)     Social Drivers of Health     Financial Resource Strain: Patient Declined (2/22/2024)    Overall Financial Resource Strain (CARDIA)     Difficulty of Paying Living Expenses: Patient declined   Food Insecurity: No Food Insecurity (2/22/2024)    Hunger Vital Sign     Worried About Running Out of Food in the Last Year: Never true     Ran Out of Food in the Last Year: Never true   Transportation Needs: No Transportation Needs (2/22/2024)    PRAPARE - Transportation     Lack of Transportation (Medical): No     Lack of Transportation (Non-Medical): No   Physical Activity: Unknown (2/22/2024)    Exercise Vital Sign     Days of Exercise per Week: 7 days   Stress: No Stress Concern Present (2/22/2024)    [employer] - Occupational Stress Questionnaire     Feeling of Stress : Not at all   Housing Stability: Low Risk  (2/22/2024)    Housing Stability Vital Sign     Unable to Pay for Housing in the Last Year: No     Number of Places Lived in the Last Year: 2     Unstable Housing in the Last Year: No

## 2025-04-29 RX ORDER — CLOTRIMAZOLE 1 %
CREAM (GRAM) TOPICAL 2 TIMES DAILY
Qty: 113 G | Refills: 0 | Status: SHIPPED | OUTPATIENT
Start: 2025-04-29

## 2025-04-30 ENCOUNTER — OFFICE VISIT (OUTPATIENT)
Dept: URGENT CARE | Facility: CLINIC | Age: 45
End: 2025-04-30
Payer: COMMERCIAL

## 2025-04-30 VITALS
BODY MASS INDEX: 26.92 KG/M2 | RESPIRATION RATE: 20 BRPM | HEIGHT: 64 IN | OXYGEN SATURATION: 97 % | SYSTOLIC BLOOD PRESSURE: 135 MMHG | TEMPERATURE: 98 F | WEIGHT: 157.69 LBS | DIASTOLIC BLOOD PRESSURE: 81 MMHG | HEART RATE: 88 BPM

## 2025-04-30 DIAGNOSIS — J00 ACUTE RHINITIS: Primary | ICD-10-CM

## 2025-04-30 PROCEDURE — 99213 OFFICE O/P EST LOW 20 MIN: CPT | Mod: S$GLB,,,

## 2025-04-30 NOTE — PROGRESS NOTES
"Subjective:      Patient ID: Duane Romreo is a 44 y.o. female.    Vitals:  height is 5' 4" (1.626 m) and weight is 71.5 kg (157 lb 10.8 oz). Her tympanic temperature is 97.8 °F (36.6 °C). Her blood pressure is 135/81 and her pulse is 88. Her respiration is 20 and oxygen saturation is 97%.     Chief Complaint: Sinus Problem    44-year-old female with past medical history significant for Dr. Hearing loss of bilateral ears, presents to the clinic with complaints of mild postnasal drip, dry occasional cough, sinus congestion, scratchy throat, ear discomfort that started yesterday.  Patient denies any fever, chills, body aches, sweats, chest pain, shortness of breath, chest congestion, sneezing.  Patient states she has been taking her Flonase as directed.  When asked about her antihistamines that were previously prescribed(Singulair), patient states she has not taken it yet and forgot that she had the medication at home.  No known sick contacts.  Patient is allergic to penicillins, celestone, and prednisone.      Sinus Problem  This is a new problem. The current episode started yesterday. The problem is unchanged. There has been no fever. Her pain is at a severity of 2/10. The pain is mild. Associated symptoms include congestion, coughing (mild, sporadic), ear pain and a sore throat. Pertinent negatives include no chills, diaphoresis, headaches, hoarse voice, neck pain, shortness of breath, sinus pressure, sneezing or swollen glands. Treatments tried: flonase. The treatment provided no relief.       Constitution: Negative for chills, sweating and fever.   HENT:  Positive for ear pain, congestion, postnasal drip and sore throat. Negative for ear discharge, sinus pressure, trouble swallowing and voice change.    Neck: Negative for neck pain and neck stiffness.   Cardiovascular:  Negative for chest pain.   Eyes:  Positive for eye discharge ("watery in the mornings"). Negative for eye itching and eye redness. "   Respiratory:  Positive for cough (mild, sporadic). Negative for sputum production and shortness of breath.    Musculoskeletal:  Negative for muscle ache.   Allergic/Immunologic: Negative for sneezing.   Neurological:  Negative for headaches.      Objective:     Vitals:    04/30/25 1702   BP: 135/81   Pulse: 88   Resp: 20   Temp: 97.8 °F (36.6 °C)       Physical Exam   Constitutional: She is oriented to person, place, and time. She appears well-developed. She is cooperative.  Non-toxic appearance. She does not appear ill. No distress.   HENT:   Head: Normocephalic and atraumatic.   Ears:   Right Ear: Hearing, external ear and ear canal normal. No no drainage or tenderness. Tympanic membrane is not erythematous and not bulging. no impacted cerumen  Left Ear: Hearing, external ear and ear canal normal. No no drainage or tenderness. Tympanic membrane is not erythematous and not bulging. no impacted cerumen  Nose: Congestion present. No mucosal edema, rhinorrhea or nasal deformity. No epistaxis. Right sinus exhibits no maxillary sinus tenderness and no frontal sinus tenderness. Left sinus exhibits no maxillary sinus tenderness and no frontal sinus tenderness.   Mouth/Throat: Uvula is midline, oropharynx is clear and moist and mucous membranes are normal. Mucous membranes are moist. No trismus in the jaw. Normal dentition. No uvula swelling. Cobblestoning (mild) present. No oropharyngeal exudate, posterior oropharyngeal edema or posterior oropharyngeal erythema. No tonsillar exudate.   Eyes: Conjunctivae and lids are normal. Pupils are equal, round, and reactive to light. Right eye exhibits no discharge. Left eye exhibits no discharge. No scleral icterus.   Neck: Trachea normal and phonation normal. Neck supple. No edema present. No erythema present. No neck rigidity present.   Cardiovascular: Normal rate, regular rhythm, normal heart sounds and normal pulses.   Pulmonary/Chest: Effort normal and breath sounds normal.  No accessory muscle usage or stridor. No tachypnea. No respiratory distress. She has no decreased breath sounds. She has no wheezes. She has no rhonchi. She has no rales.   Abdominal: Normal appearance.   Musculoskeletal: Normal range of motion.         General: No deformity. Normal range of motion.   Lymphadenopathy:     She has cervical adenopathy.   Neurological: She is alert and oriented to person, place, and time. She exhibits normal muscle tone. Coordination and gait normal.   Skin: Skin is warm, dry, intact, not diaphoretic, not pale and no rash.   Psychiatric: Her speech is normal and behavior is normal. Judgment and thought content normal.   Nursing note and vitals reviewed.      Assessment:     1. Acute rhinitis        Plan:       Acute rhinitis        Patient Instructions   PLEASE READ YOUR DISCHARGE INSTRUCTIONS ENTIRELY AS IT CONTAINS IMPORTANT INFORMATION.      - Please drink plenty of fluids.  - Please get plenty of rest.  - Please take your antihistamine medication (Singulair) as directed. These are antihistamines that can help with runny nose, nasal congestion, sneezing, and helps to dry up post-nasal drip, which usually causes sore throat and cough.    -If you do NOT have high blood pressure, you may use a decongestant form (D)  of this medication (ie. Claritin- D, zyrtec-D, allegra-D, Mucinex-D) or if you do not take the D form, you can take sudafed (pseudoephedrine) over the counter, which is a decongestant. Do NOT take two decongestant (D) medications at the same time (such as mucinex-D and claritin-D or plain sudafed and claritin D). Dextromethorphan (DM) is a cough suppressant over the counter (ie. mucinex DM, robitussin, delsym; dayquil/nyquil has DM as well.)    If you do have Hypertension or palpitations, it is safe to take Coricidin HBP for relief of sinus symptoms.    - If not allergic, please take over the counter Tylenol (Acetaminophen) and/or Motrin (Ibuprofen) as directed for control  of pain and/or fever.  Avoid tylenol if you have a history of liver disease. Do not take ibuprofen if you have a history of GI bleeding, kidney disease, or if you take blood thinners.  Please follow up with your primary care doctor or specialist as needed.    Sore throat recommendations: Warm fluids, warm salt water gargles, throat lozenges, tea, honey, soup, rest, hydration.    Use over the counter flonase: one spray each nostril twice daily OR two sprays each nostril once daily for sinus congestion and postnasal drip. This is a steroid nasal spray that works locally over time to decrease the inflammation in your nose/sinuses and help with allergic symptoms. This is not an quick- relief spray like afrin, but it works well if used daily.  Discontinue if you develop nose bleed    Sinus rinses DO NOT USE TAP WATER, if you must, water must be a rolling boil for 1 minute, let it cool, then use.  May use distilled water, or over the counter nasal saline rinses.  Vics vapor rub in shower to help open nasal passages.  May use nasal gel to keep passages moisturized.  May use Nasal saline sprays during the day for added relief of congestion.   For those who go to the gym, please do not use the sauna or steam room now to clear sinuses.    If you  smoke, please stop smoking.      Please return or see your primary care doctor if you develop new or worsening symptoms.     Please arrange follow up with your primary medical clinic as soon as possible. You must understand that you've received an Urgent Care treatment only and that you may be released before all of your medical problems are known or treated. You, the patient, will arrange for follow up as instructed. If your symptoms worsen or fail to improve you should go to the Emergency Room.

## 2025-04-30 NOTE — PATIENT INSTRUCTIONS
PLEASE READ YOUR DISCHARGE INSTRUCTIONS ENTIRELY AS IT CONTAINS IMPORTANT INFORMATION.      - Please drink plenty of fluids.  - Please get plenty of rest.  - Please take your antihistamine medication (Singulair) as directed. These are antihistamines that can help with runny nose, nasal congestion, sneezing, and helps to dry up post-nasal drip, which usually causes sore throat and cough.    -If you do NOT have high blood pressure, you may use a decongestant form (D)  of this medication (ie. Claritin- D, zyrtec-D, allegra-D, Mucinex-D) or if you do not take the D form, you can take sudafed (pseudoephedrine) over the counter, which is a decongestant. Do NOT take two decongestant (D) medications at the same time (such as mucinex-D and claritin-D or plain sudafed and claritin D). Dextromethorphan (DM) is a cough suppressant over the counter (ie. mucinex DM, robitussin, delsym; dayquil/nyquil has DM as well.)    If you do have Hypertension or palpitations, it is safe to take Coricidin HBP for relief of sinus symptoms.    - If not allergic, please take over the counter Tylenol (Acetaminophen) and/or Motrin (Ibuprofen) as directed for control of pain and/or fever.  Avoid tylenol if you have a history of liver disease. Do not take ibuprofen if you have a history of GI bleeding, kidney disease, or if you take blood thinners.  Please follow up with your primary care doctor or specialist as needed.    Sore throat recommendations: Warm fluids, warm salt water gargles, throat lozenges, tea, honey, soup, rest, hydration.    Use over the counter flonase: one spray each nostril twice daily OR two sprays each nostril once daily for sinus congestion and postnasal drip. This is a steroid nasal spray that works locally over time to decrease the inflammation in your nose/sinuses and help with allergic symptoms. This is not an quick- relief spray like afrin, but it works well if used daily.  Discontinue if you develop nose bleed    Sinus  rinses DO NOT USE TAP WATER, if you must, water must be a rolling boil for 1 minute, let it cool, then use.  May use distilled water, or over the counter nasal saline rinses.  Vics vapor rub in shower to help open nasal passages.  May use nasal gel to keep passages moisturized.  May use Nasal saline sprays during the day for added relief of congestion.   For those who go to the gym, please do not use the sauna or steam room now to clear sinuses.    If you  smoke, please stop smoking.      Please return or see your primary care doctor if you develop new or worsening symptoms.     Please arrange follow up with your primary medical clinic as soon as possible. You must understand that you've received an Urgent Care treatment only and that you may be released before all of your medical problems are known or treated. You, the patient, will arrange for follow up as instructed. If your symptoms worsen or fail to improve you should go to the Emergency Room.

## 2025-05-02 ENCOUNTER — OFFICE VISIT (OUTPATIENT)
Dept: URGENT CARE | Facility: CLINIC | Age: 45
End: 2025-05-02
Payer: COMMERCIAL

## 2025-05-02 VITALS
RESPIRATION RATE: 18 BRPM | HEART RATE: 80 BPM | SYSTOLIC BLOOD PRESSURE: 136 MMHG | OXYGEN SATURATION: 96 % | HEIGHT: 64 IN | BODY MASS INDEX: 26.8 KG/M2 | TEMPERATURE: 98 F | WEIGHT: 157 LBS | DIASTOLIC BLOOD PRESSURE: 82 MMHG

## 2025-05-02 DIAGNOSIS — J06.9 VIRAL URI WITH COUGH: Primary | ICD-10-CM

## 2025-05-02 DIAGNOSIS — J06.0 SORE THROAT AND LARYNGITIS: ICD-10-CM

## 2025-05-02 RX ORDER — BENZONATATE 200 MG/1
200 CAPSULE ORAL 3 TIMES DAILY PRN
Qty: 21 CAPSULE | Refills: 0 | Status: SHIPPED | OUTPATIENT
Start: 2025-05-02 | End: 2025-05-09

## 2025-05-02 NOTE — PATIENT INSTRUCTIONS
Laryngitis  - Vocal rest  -Throat lozenges, warm liquids, tea with honey to relieve throat irritation  - Humidifiers  - Drink lots of fluids  - Most of the time, laryngitis is caused by viral infection and should self resolve within 1-2 weeks. If symptoms persist longer than 3 weeks and are not due to acute upper respiratory tract infection,and are associated with any of the following: shortness of breath, stridor, cough, hemoptysis, throat pain, difficulty swallowing, unilateral otalgia, fever, night sweats, or weight loss the patient should be promptly referred to an otolaryngologist.      PLEASE READ YOUR DISCHARGE INSTRUCTIONS ENTIRELY AS IT CONTAINS IMPORTANT INFORMATION.      - Please drink plenty of fluids.  - Please get plenty of rest.  - You can take plain Mucinex (guaifenesin) 1200 mg twice a day to help loosen mucous.   - Use over the counter Flonase as directed  Please return here or go to the Emergency Department for any concerns or worsening of condition.  - Please take an over the counter antihistamine medication (Allegra/Claritin/Zyrtec/Xyzal) of your choice as directed. These are antihistamines that can help with runny nose, nasal congestion, sneezing, and helps to dry up post-nasal drip, which usually causes sore throat and cough.    -If you do NOT have high blood pressure, you may use a decongestant form (D)  of this medication (ie. Claritin- D, zyrtec-D, allegra-D, Mucinex-D) or if you do not take the D form, you can take sudafed (pseudoephedrine) over the counter, which is a decongestant. Do NOT take two decongestant (D) medications at the same time (such as mucinex-D and claritin-D or plain sudafed and claritin D). Dextromethorphan (DM) is a cough suppressant over the counter (ie. mucinex DM, robitussin, delsym; dayquil/nyquil has DM as well.)    If you do have Hypertension or palpitations, it is safe to take Coricidin HBP for relief of sinus symptoms.    - If not allergic, please take over the  counter Tylenol (Acetaminophen) and/or Motrin (Ibuprofen) as directed for control of pain and/or fever.  Avoid tylenol if you have a history of liver disease. Do not take ibuprofen if you have a history of GI bleeding, kidney disease, or if you take blood thinners.  Please follow up with your primary care doctor or specialist as needed.    Sore throat recommendations: Warm fluids, warm salt water gargles, throat lozenges, tea, honey, soup, rest, hydration.    Use over the counter flonase: one spray each nostril twice daily OR two sprays each nostril once daily for sinus congestion and postnasal drip. This is a steroid nasal spray that works locally over time to decrease the inflammation in your nose/sinuses and help with allergic symptoms. This is not an quick- relief spray like afrin, but it works well if used daily.  Discontinue if you develop nose bleed    Sinus rinses DO NOT USE TAP WATER, if you must, water must be a rolling boil for 1 minute, let it cool, then use.  May use distilled water, or over the counter nasal saline rinses.  Vics vapor rub in shower to help open nasal passages.  May use nasal gel to keep passages moisturized.  May use Nasal saline sprays during the day for added relief of congestion.   For those who go to the gym, please do not use the sauna or steam room now to clear sinuses.    If you  smoke, please stop smoking.      Please return or see your primary care doctor if you develop new or worsening symptoms.     Please arrange follow up with your primary medical clinic as soon as possible. You must understand that you've received an Urgent Care treatment only and that you may be released before all of your medical problems are known or treated. You, the patient, will arrange for follow up as instructed. If your symptoms worsen or fail to improve you should go to the Emergency Room.

## 2025-05-02 NOTE — PROGRESS NOTES
"Subjective:      Patient ID: Duane Romero is a 44 y.o. female.    Vitals:  height is 5' 4" (1.626 m) and weight is 71.2 kg (157 lb). Her tympanic temperature is 98 °F (36.7 °C). Her blood pressure is 136/82 and her pulse is 80. Her respiration is 18 and oxygen saturation is 96%.     Chief Complaint: Sinus Problem    44-year-old female Pt presents to the clinic with new laryngitis and worsening cough since recent visit with us earlier in the week.  She states she is still having the nasal congestion with postnasal drip.  She came into days ago but symptoms have since worsened.  She has started her Singulair and has continued her Flonase nasal spray as directed from previous visit.  She denies any fever, chills, body aches, sweats, ear pain.  She denies any trouble swallowing, chest pain, shortness of breath.  She is allergic to Celestone, prednisone, penicillins and paraffin.      Sinus Problem  This is a new problem. The problem has been gradually worsening since onset. There has been no fever. She is experiencing no pain. Associated symptoms include congestion, coughing, a hoarse voice and a sore throat. Pertinent negatives include no chills, diaphoresis, neck pain, shortness of breath, sinus pressure, sneezing or swollen glands. Past treatments include nasal decongestants. The treatment provided mild relief.       Constitution: Negative for chills, sweating and fever.   HENT:  Positive for congestion, postnasal drip, sore throat and voice change (hoarse). Negative for ear discharge, sinus pressure and trouble swallowing.    Neck: Negative for neck pain and neck stiffness.   Cardiovascular:  Negative for chest pain.   Respiratory:  Positive for cough. Negative for shortness of breath.    Allergic/Immunologic: Negative for sneezing.      Objective:     Vitals:    05/02/25 1120   BP: 136/82   Pulse: 80   Resp: 18   Temp: 98 °F (36.7 °C)       Physical Exam   Constitutional: She is oriented to person, " place, and time. She appears well-developed. She is cooperative.  Non-toxic appearance. She does not appear ill. No distress.   HENT:   Head: Normocephalic and atraumatic.   Ears:   Right Ear: Hearing, tympanic membrane, external ear and ear canal normal. No swelling or tenderness. Tympanic membrane is not erythematous and not bulging. no impacted cerumen  Left Ear: Hearing, tympanic membrane, external ear and ear canal normal. No swelling or tenderness. Tympanic membrane is not erythematous and not bulging. no impacted cerumen  Nose: Nose normal. No mucosal edema, rhinorrhea or nasal deformity. No epistaxis. Right sinus exhibits no maxillary sinus tenderness and no frontal sinus tenderness. Left sinus exhibits no maxillary sinus tenderness and no frontal sinus tenderness.   Mouth/Throat: Uvula is midline, oropharynx is clear and moist and mucous membranes are normal. Mucous membranes are moist. No trismus in the jaw. Normal dentition. No uvula swelling. Cobblestoning present. No oropharyngeal exudate, posterior oropharyngeal edema or posterior oropharyngeal erythema. No tonsillar exudate.   Eyes: Conjunctivae and lids are normal. Pupils are equal, round, and reactive to light. Right eye exhibits no discharge. Left eye exhibits no discharge. No scleral icterus.   Neck: Trachea normal and phonation normal. Neck supple. No edema present. No erythema present. No neck rigidity present.   Cardiovascular: Normal rate, regular rhythm, normal heart sounds and normal pulses.   Pulmonary/Chest: Effort normal and breath sounds normal. No accessory muscle usage or stridor. No tachypnea. No respiratory distress. She has no decreased breath sounds. She has no wheezes. She has no rhonchi. She has no rales.   Abdominal: Normal appearance.   Musculoskeletal: Normal range of motion.         General: No deformity. Normal range of motion.   Neurological: She is alert and oriented to person, place, and time. She exhibits normal muscle  tone. Coordination and gait normal.   Skin: Skin is warm, dry, intact, not diaphoretic, not pale and no rash.   Psychiatric: Her speech is normal and behavior is normal. Judgment and thought content normal.   Nursing note and vitals reviewed.      Assessment:     1. Viral URI with cough    2. Sore throat and laryngitis        Plan:       Viral URI with cough  -     benzonatate (TESSALON) 200 MG capsule; Take 1 capsule (200 mg total) by mouth 3 (three) times daily as needed for Cough.  Dispense: 21 capsule; Refill: 0    Sore throat and laryngitis        Patient Instructions   Laryngitis  - Vocal rest  -Throat lozenges, warm liquids, tea with honey to relieve throat irritation  - Humidifiers  - Drink lots of fluids  - Most of the time, laryngitis is caused by viral infection and should self resolve within 1-2 weeks. If symptoms persist longer than 3 weeks and are not due to acute upper respiratory tract infection,and are associated with any of the following: shortness of breath, stridor, cough, hemoptysis, throat pain, difficulty swallowing, unilateral otalgia, fever, night sweats, or weight loss the patient should be promptly referred to an otolaryngologist.      PLEASE READ YOUR DISCHARGE INSTRUCTIONS ENTIRELY AS IT CONTAINS IMPORTANT INFORMATION.      - Please drink plenty of fluids.  - Please get plenty of rest.  - You can take plain Mucinex (guaifenesin) 1200 mg twice a day to help loosen mucous.   - Use over the counter Flonase as directed  Please return here or go to the Emergency Department for any concerns or worsening of condition.  - Please take an over the counter antihistamine medication (Allegra/Claritin/Zyrtec/Xyzal) of your choice as directed. These are antihistamines that can help with runny nose, nasal congestion, sneezing, and helps to dry up post-nasal drip, which usually causes sore throat and cough.    -If you do NOT have high blood pressure, you may use a decongestant form (D)  of this  medication (ie. Claritin- D, zyrtec-D, allegra-D, Mucinex-D) or if you do not take the D form, you can take sudafed (pseudoephedrine) over the counter, which is a decongestant. Do NOT take two decongestant (D) medications at the same time (such as mucinex-D and claritin-D or plain sudafed and claritin D). Dextromethorphan (DM) is a cough suppressant over the counter (ie. mucinex DM, robitussin, delsym; dayquil/nyquil has DM as well.)    If you do have Hypertension or palpitations, it is safe to take Coricidin HBP for relief of sinus symptoms.    - If not allergic, please take over the counter Tylenol (Acetaminophen) and/or Motrin (Ibuprofen) as directed for control of pain and/or fever.  Avoid tylenol if you have a history of liver disease. Do not take ibuprofen if you have a history of GI bleeding, kidney disease, or if you take blood thinners.  Please follow up with your primary care doctor or specialist as needed.    Sore throat recommendations: Warm fluids, warm salt water gargles, throat lozenges, tea, honey, soup, rest, hydration.    Use over the counter flonase: one spray each nostril twice daily OR two sprays each nostril once daily for sinus congestion and postnasal drip. This is a steroid nasal spray that works locally over time to decrease the inflammation in your nose/sinuses and help with allergic symptoms. This is not an quick- relief spray like afrin, but it works well if used daily.  Discontinue if you develop nose bleed    Sinus rinses DO NOT USE TAP WATER, if you must, water must be a rolling boil for 1 minute, let it cool, then use.  May use distilled water, or over the counter nasal saline rinses.  Vics vapor rub in shower to help open nasal passages.  May use nasal gel to keep passages moisturized.  May use Nasal saline sprays during the day for added relief of congestion.   For those who go to the gym, please do not use the sauna or steam room now to clear sinuses.    If you  smoke, please stop  smoking.      Please return or see your primary care doctor if you develop new or worsening symptoms.     Please arrange follow up with your primary medical clinic as soon as possible. You must understand that you've received an Urgent Care treatment only and that you may be released before all of your medical problems are known or treated. You, the patient, will arrange for follow up as instructed. If your symptoms worsen or fail to improve you should go to the Emergency Room.

## 2025-05-08 ENCOUNTER — TELEPHONE (OUTPATIENT)
Dept: INTERNAL MEDICINE | Facility: CLINIC | Age: 45
End: 2025-05-08

## 2025-05-08 ENCOUNTER — OFFICE VISIT (OUTPATIENT)
Dept: INTERNAL MEDICINE | Facility: CLINIC | Age: 45
End: 2025-05-08
Payer: COMMERCIAL

## 2025-05-08 VITALS
WEIGHT: 154.56 LBS | BODY MASS INDEX: 26.39 KG/M2 | HEIGHT: 64 IN | OXYGEN SATURATION: 97 % | TEMPERATURE: 98 F | HEART RATE: 76 BPM | DIASTOLIC BLOOD PRESSURE: 82 MMHG | SYSTOLIC BLOOD PRESSURE: 144 MMHG

## 2025-05-08 DIAGNOSIS — H66.002 ACUTE SUPPURATIVE OTITIS MEDIA OF LEFT EAR WITHOUT SPONTANEOUS RUPTURE OF TYMPANIC MEMBRANE, RECURRENCE NOT SPECIFIED: Primary | ICD-10-CM

## 2025-05-08 PROCEDURE — 3008F BODY MASS INDEX DOCD: CPT | Mod: CPTII,S$GLB,, | Performed by: PHYSICIAN ASSISTANT

## 2025-05-08 PROCEDURE — 99999 PR PBB SHADOW E&M-EST. PATIENT-LVL IV: CPT | Mod: PBBFAC,,, | Performed by: PHYSICIAN ASSISTANT

## 2025-05-08 PROCEDURE — 99213 OFFICE O/P EST LOW 20 MIN: CPT | Mod: S$GLB,,, | Performed by: PHYSICIAN ASSISTANT

## 2025-05-08 PROCEDURE — 1160F RVW MEDS BY RX/DR IN RCRD: CPT | Mod: CPTII,S$GLB,, | Performed by: PHYSICIAN ASSISTANT

## 2025-05-08 PROCEDURE — 3077F SYST BP >= 140 MM HG: CPT | Mod: CPTII,S$GLB,, | Performed by: PHYSICIAN ASSISTANT

## 2025-05-08 PROCEDURE — 1159F MED LIST DOCD IN RCRD: CPT | Mod: CPTII,S$GLB,, | Performed by: PHYSICIAN ASSISTANT

## 2025-05-08 PROCEDURE — 3079F DIAST BP 80-89 MM HG: CPT | Mod: CPTII,S$GLB,, | Performed by: PHYSICIAN ASSISTANT

## 2025-05-08 RX ORDER — AZITHROMYCIN 250 MG/1
TABLET, FILM COATED ORAL
Qty: 6 TABLET | Refills: 0 | Status: SHIPPED | OUTPATIENT
Start: 2025-05-08

## 2025-05-08 NOTE — TELEPHONE ENCOUNTER
Patient declined scheduling a b/p check at this time.  She stated she will just pop in after school is done for the summer.

## 2025-05-08 NOTE — PROGRESS NOTES
Subjective:       Patient ID: Duane Romero is a 44 y.o. female.    Chief Complaint: Allergic Reaction (Allergy symptoms)      History of Present Illness    HPI:  Ms. Romero reports symptom onset on April 30th, with rhinorrhea, cephalgia, otic pressure, pharyngalgia, and a burning sensation in the throat. She also had ocular and cephalic pressure. A cough developed on Thursday night. She was evaluated at an [facility] clinic on Thursday and Monday. Laryngitis began on Thursday and progressed throughout the day.    Ms. Romero has a history of rhinitis related to allergies and is allergic to ragweed, pollen, and grass. She describes a burning sensation in the throat, potentially contributing to a burning feeling in the ears. She has a history of ear problems and uses hearing aids. She was previously prescribed Singulair, Flonase, Allegra, and Azelastine for symptom management. A decongestant was recommended but not taken due to concerns about interactions with ADHD medication. Cough pearls were prescribed 6 days ago, which she reports are effective when taken at night.    Ms. Romero expresses concern about the severity of her allergies and the perceived ineffectiveness of current treatments. She mentions changing allergy care providers to receive allergy shots. She has a history of tympanic membrane perforations, precluding the use of otic drops containing steroids. The left ear is noted to have the most significant hearing loss.    Ms. Romero denies daytime coughing, only coughing at night.         Review of Systems   Constitutional:  Negative for chills and fever.   HENT:  Positive for congestion, ear pain, rhinorrhea and sore throat. Negative for ear discharge and trouble swallowing.    Respiratory:  Positive for cough. Negative for shortness of breath.    Cardiovascular:  Negative for chest pain.   Neurological:  Positive for headaches. Negative for dizziness and light-headedness.      "    Objective:     Vitals:    05/08/25 1023 05/08/25 1104   BP: (!) 145/84 (!) 144/82   BP Location: Right arm Right arm   Patient Position: Sitting Sitting   Pulse: 76    Temp: 97.9 °F (36.6 °C)    TempSrc: Tympanic    SpO2: 97%    Weight: 70.1 kg (154 lb 8.7 oz)    Height: 5' 4" (1.626 m)          Physical Exam  Vitals and nursing note reviewed.   Constitutional:       General: She is not in acute distress.     Appearance: She is well-developed.   HENT:      Head: Normocephalic and atraumatic.      Right Ear: Ear canal and external ear normal. A middle ear effusion is present.      Left Ear: Ear canal and external ear normal. A middle ear effusion is present. Tympanic membrane is erythematous.      Nose: Nose normal.      Mouth/Throat:      Lips: Pink.      Mouth: Mucous membranes are moist.      Pharynx: Oropharynx is clear.   Eyes:      General: Lids are normal. No scleral icterus.     Extraocular Movements: Extraocular movements intact.      Conjunctiva/sclera: Conjunctivae normal.   Cardiovascular:      Rate and Rhythm: Normal rate and regular rhythm.   Pulmonary:      Effort: Pulmonary effort is normal.      Breath sounds: Normal breath sounds. No decreased breath sounds, wheezing, rhonchi or rales.   Neurological:      Mental Status: She is alert.      Cranial Nerves: No cranial nerve deficit.   Psychiatric:         Mood and Affect: Mood and affect normal.           Assessment:     1. Acute suppurative otitis media of left ear without spontaneous rupture of tympanic membrane, recurrence not specified          Plan:   1. Acute suppurative otitis media of left ear without spontaneous rupture of tympanic membrane, recurrence not specified  -     azithromycin (Z-ART) 250 MG tablet; Take 2 tablets by mouth on day 1; Take 1 tablet by mouth on days 2-5  Dispense: 6 tablet; Refill: 0        Assessment & Plan     Explained difference between pseudoephedrine (behind-the-counter) and phenylephrine (OTC) " decongestants.   Discussed potential side effects of decongestants, including elevated BP and palpitations.   Explained that milder decongestants are generally safe for patient's condition, but would be contraindicated in cases of heart problems, arrhythmia, or uncontrolled high BP.   Started OTC phenylephrine (Sudafed) for congestion.   Selected Z-Brian (azithromycin) as antibiotic of choice for ear infection.           Future Appointments   Date Time Provider Department Center   5/23/2025 11:00 AM [facility] ON [facility]   6/3/2025 11:00 AM Amol Reinoso MD ON ORTHO [facility]   6/11/2025 11:00 AM Amol Reinoso MD ON ORTHO [facility]   6/23/2025  2:00 PM [facility]   6/24/2025 12:30 PM Madeline Persaud MD [facility]

## 2025-05-08 NOTE — PATIENT INSTRUCTIONS
I suggest taking over the counter Sudafed PE (phenylephrine is generic name) as directed on package.

## 2025-05-21 DIAGNOSIS — R73.03 PREDIABETES: ICD-10-CM

## 2025-05-23 ENCOUNTER — HOSPITAL ENCOUNTER (OUTPATIENT)
Dept: CARDIOLOGY | Facility: HOSPITAL | Age: 45
Discharge: HOME | End: 2025-05-23
Attending: ANESTHESIOLOGY
Payer: COMMERCIAL

## 2025-05-23 ENCOUNTER — OFFICE VISIT (OUTPATIENT)
Dept: INTERNAL MEDICINE | Facility: CLINIC | Age: 45
End: 2025-05-23
Payer: COMMERCIAL

## 2025-05-23 VITALS
HEART RATE: 71 BPM | DIASTOLIC BLOOD PRESSURE: 89 MMHG | OXYGEN SATURATION: 97 % | SYSTOLIC BLOOD PRESSURE: 142 MMHG | TEMPERATURE: 98 F

## 2025-05-23 DIAGNOSIS — E11.9 TYPE 2 DIABETES MELLITUS WITHOUT COMPLICATION, WITHOUT LONG-TERM CURRENT USE OF INSULIN: ICD-10-CM

## 2025-05-23 DIAGNOSIS — G56.03 BILATERAL CARPAL TUNNEL SYNDROME: Primary | ICD-10-CM

## 2025-05-23 DIAGNOSIS — F90.2 ATTENTION DEFICIT HYPERACTIVITY DISORDER (ADHD), COMBINED TYPE: ICD-10-CM

## 2025-05-23 DIAGNOSIS — Z01.818 PREOPERATIVE EXAMINATION: ICD-10-CM

## 2025-05-23 DIAGNOSIS — Z01.818 PREOP TESTING: ICD-10-CM

## 2025-05-23 DIAGNOSIS — J30.2 SEASONAL ALLERGIES: ICD-10-CM

## 2025-05-23 DIAGNOSIS — H90.0 CONDUCTIVE HEARING LOSS OF BOTH EARS: ICD-10-CM

## 2025-05-23 LAB
OHS QRS DURATION: 88 MS
OHS QTC CALCULATION: 418 MS

## 2025-05-23 PROCEDURE — 99999 PR PBB SHADOW E&M-EST. PATIENT-LVL III: CPT | Mod: PBBFAC,,,

## 2025-05-23 PROCEDURE — 93005 ELECTROCARDIOGRAM TRACING: CPT

## 2025-05-23 PROCEDURE — 93010 ELECTROCARDIOGRAM REPORT: CPT | Mod: ,,, | Performed by: INTERNAL MEDICINE

## 2025-05-23 NOTE — ASSESSMENT & PLAN NOTE
RELEASE, CARPAL TUNNEL (LEFT) planned per Dr. Reinoso on 5/29/2025     Known risk factors for perioperative complications: Cochlear implants present    Diabetes Mellitus   Difficulty with intubation is not anticipated.    Cardiac Risk Estimation: Based on the Revised Cardiac Risk index, patient has RCRI score of 0 with a 3.9 % risk of a major cardiac event in a low risk procedure.    1.) Preoperative workup as follows: ECG, hemoglobin, hematocrit, electrolytes, creatinine, glucose.  2.) Change in medication regimen before surgery: discontinue ASA 6 days before surgery, discontinue NSAIDs 5 days before surgery, hold Metformin 24 hours prior to surgery. Hold all vitamins/supplements for 7 days prior to surgery, with the exception of Potassium and Iron supplementation. Hold semaglutide/tirzepatide for 7 days prior to surgery. Please refrain from use of alcohol for 72 hours prior to procedure.   3.) Prophylaxis for cardiac events with perioperative beta-blockers: not indicated.  4.) Invasive hemodynamic monitoring perioperatively: at the discretion of anesthesiologist.  5.) Deep vein thrombosis prophylaxis postoperatively: regimen to be chosen by surgical team.  6.) Surveillance for postoperative MI with ECG immediately postoperatively and on postoperati ve days 1 and 2 AND troponin levels 24 hours postoperatively and on day 4 or hospital discharge (whichever comes first): at the discretion of anesthesiologist.  7.) Current medications which may produce withdrawal symptoms if withheld perioperatively: None   8.) Other measures: Postoperative hypertension management with IV hydralazine until able to take oral medications.  Postoperative incentive spirometry to prevent pneumonia.

## 2025-05-23 NOTE — ASSESSMENT & PLAN NOTE
-patient has bilateral cochlear implants present   -patient is to have no MRI imaging and no steroids (Epi pen for Celestone allergy) per patient instruction   -pt has 70% hearing loss and asks that you speak into the Right ear once implants are removed

## 2025-05-23 NOTE — ASSESSMENT & PLAN NOTE
-stable on prescribed regime   -Astelin and Flonase continued- patient takes as needed for acute allergy flare  -Singulair continued at night- patient takes as needed

## 2025-05-23 NOTE — PROGRESS NOTES
Preoperative History and Physical  [facility]                                                                   Chief Complaint: Preoperative evaluation     History of Present Illness:      Duane Romero is a 44 y.o. female who presents to the office today for a preoperative consultation at the request of Dr. Reinoso who plans on performing a RELEASE, CARPAL TUNNEL on May 29.     Functional Status:      The patient is able to climb a flight of stairs. The patient is able to ambulate without difficulty. The patient's functional status is not affected by the surgical problem. The patient's functional status is not affected by shortness of breath, chest pain, dyspnea on exertion and fatigue.    MET score greater than 4    Patient Anesthesia History:      History of Malignant Hyperthermia: no  History of Pseudocholinesterase Deficiency: no  History PONV: no  History of difficult intubation: no  History of delayed emergence: no  History of high fever after anesthesia: no    Family Anesthesia History:      History of Malignant Hyperthermia: no  History of Pseudocholinesterase Deficiency: no    Past Medical History:      Past Medical History:   Diagnosis Date    ADHD (attention deficit hyperactivity disorder)     Allergy     Asthma     Diabetes mellitus 2025    Hyperlipidemia     Otitis media     Prediabetes     Sinusitis         Past Surgical History:      Past Surgical History:   Procedure Laterality Date    ADENOIDECTOMY       SECTION      CYST REMOVAL Right     Under right arm    DILATION AND CURETTAGE OF UTERUS      HERNIA REPAIR  2022    HYSTERECTOMY  2021    FRANCO/BS    IMPLANTATION OF BONE ANCHORED HEARING AID (BAHA) Bilateral 2022    Procedure: INSERTION, BAHA;  Surgeon: Jeff Kelley MD;  Location: [facility];  Service: ENT;  Laterality: Bilateral;    TONSILLECTOMY      TUBAL LIGATION      TYMPANOPLASTY      TYMPANOSTOMY TUBE  PLACEMENT      UMBILICAL HERNIA REPAIR N/A 06/24/2022    Procedure: REPAIR, HERNIA, UMBILICAL, AGE 5 YEARS OR OLDER;  Surgeon: Philipp Paiz MD;  Location: [facility] OR;  Service: General;  Laterality: N/A;        Social History:      Social History     Socioeconomic History    Marital status:    Tobacco Use    Smoking status: Never    Smokeless tobacco: Never   Substance and Sexual Activity    Alcohol use: Yes     Comment: ocassional - no ETOH 72 hours before surgery    Drug use: Never    Sexual activity: Not Currently     Partners: Male     Birth control/protection: None     Comment: I had a partial hysterectomy 5-   Social History Narrative    Patient goal(s): Weight of 130 lbs    Motivation: Stay healthy (prevent dz/need for rx) to enjoy outdoor activities with family or by herself    Breakfast: 1-2 sausages & 1-2 eggs with 1 slice bread; water & coffee w/ Stevia & sugar-free creamer    Lunch: Boneless chicken wings & 1 thin slice of cheesecake or 1 turkey sandwich on white bread, 1 slice of American cheese; water    Dinner: Hamburger (90:10 or 80:20 meat) with cheese & bread or chicken; water or wine 5.5 oz    Snacks: Apples or 1-2 small bag of chips daily (160 jamaal)    Eating out: 3-4x/wk    Water (oz/day): 102 oz/d    Physical Activity: 35-50 min/d of resistance/aerobic & 4-5d/wk     Strategies: Lose it radha (smaller portions; lower carb)     Social Drivers of Health     Financial Resource Strain: Low Risk  (5/8/2025)    Overall Financial Resource Strain (CARDIA)     Difficulty of Paying Living Expenses: Not hard at all   Food Insecurity: No Food Insecurity (5/8/2025)    Hunger Vital Sign     Worried About Running Out of Food in the Last Year: Never true     Ran Out of Food in the Last Year: Never true   Transportation Needs: No Transportation Needs (5/8/2025)    PRAPARE - Transportation     Lack of Transportation (Medical): No     Lack of Transportation (Non-Medical): No   Physical Activity:  Sufficiently Active (5/8/2025)    Exercise Vital Sign     Days of Exercise per Week: 6 days     Minutes of Exercise per Session: 60 min   Stress: No Stress Concern Present (5/8/2025)    [employer] [place] [employer] - Occupational Stress Questionnaire     Feeling of Stress : Only a little   Housing Stability: High Risk (5/8/2025)    Housing Stability Vital Sign     Unable to Pay for Housing in the Last Year: Yes     Number of Times Moved in the Last Year: 1     Homeless in the Last Year: No        Family History:      Family History   Problem Relation Name Age of Onset    Hyperlipidemia Mother Mom     Diabetes Father Father         DM2 insulin pump & oral    Hypertension Father Father     Obesity Father Father     Breast cancer Maternal Aunt Aunt     Cancer Maternal Aunt Aunt         Skin cancer    Thyroid disease Maternal Grandmother Rima rondon     Migraines Neg Hx      Asthma Neg Hx         Allergies:      Review of patient's allergies indicates:   Allergen Reactions    Celestone [betamethasone]      Allergy testing.    Paraffin     Pcn [penicillins] Itching     Injection only.  Can tolerate oral form    Prednisone      Other reaction(s): increased heart rate       Medications:      Current Outpatient Medications   Medication Sig    azelastine (ASTELIN) 137 mcg (0.1 %) nasal spray 1 spray (137 mcg total) by Nasal route 2 (two) times daily.    clotrimazole (LOTRIMIN) 1 % cream Apply topically 2 (two) times daily. (Patient taking differently: Apply topically 2 (two) times daily. PRN)    dextroamphetamine-amphetamine (ADDERALL XR) 30 MG 24 hr capsule Take by mouth every morning.    EPINEPHrine (AUVI-Q) 0.3 mg/0.3 mL AtIn Inject 0.3 mLs (0.3 mg total) into the muscle once. for 1 dose    fluticasone propionate (FLONASE) 50 mcg/actuation nasal spray USE 1 SPRAY (50 MCG TOTAL) IN EACH NOSTRIL ONCE DAILY    montelukast (SINGULAIR) 10 mg tablet TAKE 1 TABLET BY MOUTH EVERY DAY IN THE EVENING     No current  facility-administered medications for this visit.     Facility-Administered Medications Ordered in Other Visits   Medication    triamcinolone acetonide injection 40 mg    triamcinolone acetonide injection 40 mg       Vitals:      Vitals:    05/23/25 1057   BP: (!) 142/89   Pulse: 71   Temp: 98.4 °F (36.9 °C)       Review of Systems:        Constitutional: Negative for fever, chills, weight loss, malaise/fatigue and diaphoresis.   HENT: Negative for hearing loss, ear pain, nosebleeds, congestion, sore throat, neck pain, tinnitus and ear discharge.    Eyes: Negative for blurred vision, double vision, photophobia, pain, discharge and redness.   Respiratory: Negative for cough, hemoptysis, sputum production, shortness of breath, wheezing and stridor.    Cardiovascular: Negative for chest pain, palpitations, orthopnea, claudication, leg swelling and PND.   Gastrointestinal: Negative for heartburn, nausea, vomiting, abdominal pain, diarrhea, constipation, blood in stool and melena.   Genitourinary: Negative for dysuria, urgency, frequency, hematuria and flank pain.   Musculoskeletal: Negative for myalgias, back pain, and falls. + L wrist/hand pain   Skin: Negative for itching and rash.   Neurological: Negative for dizziness, tingling, tremors, sensory change, speech change, focal weakness, seizures, loss of consciousness, weakness. + headaches (chronic).   Endo/Heme/Allergies: Negative for environmental allergies and polydipsia. Does not bruise/bleed easily.   Psychiatric/Behavioral: Negative for depression, suicidal ideas, hallucinations, memory loss and substance abuse. The patient is not nervous/anxious and does not have insomnia.    All 14 systems reviewed and negative except as noted above.    Physical Exam:      Constitutional: Appears well-developed, well-nourished and in no acute distress.  Patient is oriented to person, place, and time. Cochlear implants present (bilateral)  Head: Normocephalic and atraumatic.  Mucous membranes moist.  Neck: Neck supple no mass.   Cardiovascular: Normal rate and regular rhythm.  S1 S2 appreciated by ascultation.  Pulmonary/Chest: Effort normal and clear to auscultation bilaterally. No respiratory distress.   Abdomen: Soft. Non-tender and non-distended. Bowel sounds are normal.   Neurological: Patient is alert and oriented to person, place and time. Moves all extremities.  Skin: Warm and dry. No lesions.  Extremities: No clubbing, cyanosis or edema.    Laboratory data:      Reviewed and noted in plan where applicable. Please see chart for full laboratory data.    Lab Results   Component Value Date    WBC 6.67 05/23/2025    HGB 15.5 05/23/2025    HCT 43.2 05/23/2025    MCV 88 05/23/2025     05/23/2025     Comprehensive Metabolic Panel  Order: [id number]   Status: Final result  Dx: Preop testing            Component  Ref Range & Units (hover) 11:40  (5/23/25) 1 yr ago  (4/5/24) 1 yr ago  (10/11/23) 2 yr ago  (6/6/22) 5 yr ago  (11/27/19)   Sodium 136 138 137 140 137   Potassium 3.9 4.3 3.8 4.5 4.0   Chloride 102 107 106 108 106   CO2 27 23 25 24 21 Low    Glucose 190 High  138 High  211 High  106 232 High    BUN 14 14 14 19 13   Creatinine 0.7 0.7 0.8 0.6 0.7   Calcium 9.4 9.0 9.0 8.8 8.8   Protein Total 6.6  6.1 R 5.8 Low  R 6.3 R   Albumin 3.7  3.7 3.7 3.5   Bilirubin Total 0.6  0.4 CM 0.5 CM 0.6 CM   Comment: For infants and newborns, interpretation of results should be based  on gestational age, weight and in agreement with clinical  observations.    Premature Infant recommended reference ranges:  0-24 hours:  <8.0 mg/dL  24-48 hours: <12.0 mg/dL  3-5 days:    <15.0 mg/dL  6-29 days:   <15.0 mg/dL   ALP 70  68 R 53 Low  R 87 R   AST 10 Low   13 R 15 R 13 R   ALT 19  16 R 21 R 18 R   Anion Gap 7 Low  8 6 Low  8 10   eGFR >60 >60.0 R >60.0 R     Comment: Estimated GFR calculated using the CKD-EPI creatinine (2021) equation.   Resulting Agency [facility]              Specimen Collected: 05/23/25 11:40 CDT Last Resulted: 05/23/25 12:28 CDT       Predictors of intubation difficulty:       Morbid obesity? no   Anatomically abnormal facies? no   Prominent incisors? no   Receding mandible? no   Short, thick neck? no   Neck range of motion: normal   Dentition: Missing teeth (R bottom premolar x 1)  Based on the Modified Mallampati, patient is a mallampati score: II (hard and soft palate, upper portion of tonsils anduvula visible)    Cardiographics:      ECG: Normal sinus rhythm with sinus arrhythmia -Nonspecific T wave abnormality-   Abnormal ECG   Echocardiogram: not indicated    Imaging:      Chest x-ray: not indicated    Assessment and Plan:      1. Bilateral carpal tunnel syndrome  Assessment & Plan:  RELEASE, CARPAL TUNNEL (LEFT) planned per Dr. Reinoso on 5/29/2025     Known risk factors for perioperative complications: Cochlear implants present    Diabetes Mellitus   Difficulty with intubation is not anticipated.    Cardiac Risk Estimation: Based on the Revised Cardiac Risk index, patient has RCRI score of 0 with a 3.9 % risk of a major cardiac event in a low risk procedure.    1.) Preoperative workup as follows: ECG, hemoglobin, hematocrit, electrolytes, creatinine, glucose.  2.) Change in medication regimen before surgery: discontinue ASA 6 days before surgery, discontinue NSAIDs 5 days before surgery, hold Metformin 24 hours prior to surgery. Hold all vitamins/supplements for 7 days prior to surgery, with the exception of Potassium and Iron supplementation. Hold semaglutide/tirzepatide for 7 days prior to surgery. Please refrain from use of alcohol for 72 hours prior to procedure.   3.) Prophylaxis for cardiac events with perioperative beta-blockers: not indicated.  4.) Invasive hemodynamic monitoring perioperatively: at the discretion of anesthesiologist.  5.) Deep vein thrombosis prophylaxis postoperatively: regimen to be chosen by surgical team.  6.) Surveillance for  postoperative MI with ECG immediately postoperatively and on postoperati ve days 1 and 2 AND troponin levels 24 hours postoperatively and on day 4 or hospital discharge (whichever comes first): at the discretion of anesthesiologist.  7.) Current medications which may produce withdrawal symptoms if withheld perioperatively: None   8.) Other measures: Postoperative hypertension management with IV hydralazine until able to take oral medications.  Postoperative incentive spirometry to prevent pneumonia.       2. Preoperative examination  -     Ambulatory referral/consult to Pre-Admit  -     Hemoglobin A1C; Future; Expected date: 05/23/2025    3. Conductive hearing loss of both ears  Assessment & Plan:  -patient has bilateral cochlear implants present   -patient is to have no MRI imaging and no steroids (Epi pen for Celestone allergy) per patient instruction   -pt has 70% hearing loss and asks that you speak into the Right ear once implants are removed      4. Attention deficit hyperactivity disorder (ADHD), combined type  Overview:  Followed by Psychiatry, continue current treatment plan, Dr. Cao [place] [facility] [place]    Assessment & Plan:  -Adderall prescribed- patient advised to hold medication for 48 hours prior to procedure       5. Seasonal allergies  Assessment & Plan:  -stable on prescribed regime   -Astelin and Flonase continued- patient takes as needed for acute allergy flare  -Singulair continued at night- patient takes as needed       6. Type 2 diabetes mellitus without complication, without long-term current use of insulin  Assessment & Plan:  HbA1c 7.7 on 5/23/2025 and glucose 190 (non-fasting) on CMP on 5/23/2025  -patient notified and reports DM usually controlled by diet and exercise and confirms recent noncompliance dietary restrictions   -patient advised to follow ADA diet and increase exercise   -patient to follow up with PCP outpatient for further evaluation and repeat lab analysis                Electronically  signed by Harper Baca NP on 5/24/2025 at 10:54 AM.

## 2025-05-23 NOTE — ASSESSMENT & PLAN NOTE
-HbA1c 7.7 on 5/23/2025   -glucose 190 (non-fasting) on CMP on 5/23/2025  -patient notified and reports previous diagnosis of Diabetes Mellitus   -patient advised to follow ADA diet and increase exercise   -patient to follow up with PCP outpatient for further evaluation and repeat lab analysis

## 2025-05-23 NOTE — PRE ADMISSION SCREENING
Pre op instructions reviewed with patient during Clinic Visit with Provider.    To confirm, Surgery is scheduled on 5/29/25. We will call you late afternoon the business day prior to surgery with your arrival time.    *Please report to the [facility] Lobby (1st Floor) located off of [facility] (2nd Entrance/Building on the left, in front of the flag pole). Address: [address] [place] [address] [place] [address]    INSTRUCTIONS IMPORTANT!!!    Do not eat after 12 midnight, Do not smoke or use chewing tobacco after 12 midnight!  OK to brush teeth, but no gum, candy, or mints!       - Patients should stop full meals at midnight, but they can consume clear liquids up to 3 hours prior to scheduled arrival time.  -Clear liquids include Gatorade, water, soda, black coffee, jello or tea (no milk or creamer), and clear juices.  -Clear liquids do NOT include anything with pulp or food particles (Chicken broth, ice cream, yogurt etc.)             !!!!!! NOTHING RED OR PURPLE !!!!      MORNING OF SURGERY, drink small sip of water with the following medications instructed by Pre-Admit Provider:  NONE      *ADHD Medication: Stop taking ADDERALL 48 hours prior to surgery, as this can affect the anesthesia used. Bariatric surgeries must HOLD 7 Days prior to surgery!    Diabetic Patients: If you take diabetic or weight loss medication, Do NOT take morning of surgery unless instructed by Doctor. Metformin to be stopped 24 hrs prior to surgery.     DO NOT take long-acting insulin the evening before surgery. Blood sugars will be checked in pre-op by Nurse.    If you take Ozempic/ Mounjaro / Wegovy / Trulicity / Semaglutide, any weight loss injections OR PHENTERMINE --->>> PLEASE LET US KNOW IMMEDIATELY, as these medications need to be stopped 7 days prior to surgery!    *Patients should HOLD all vitamins, herbal supplements, weight loss medication, aspirin products & NSAIDS 7 days prior to surgery, as these can thin the blood.  Ok to take Tylenol.    ____  Avoid Alcoholic beverages 3 days prior to surgery, as it can thin the blood.  ____  NO Acrylic/fake nails or nail polish worn day of surgery (specifically hand/arm & foot surgeries).  ____  NO powder, lotions, deodorants, oils or cream on body.  ____  Remove all jewelry, piercings, & foreign objects prior to arrival and leave at home.  ____  Remove Dentures, Hearing Aids & Contact Lens prior to surgery.  ____  Bring photo ID and insurance information to hospital (Leave Valuables at Home).  ____  If going home the same day, arrange for a ride home. You will not be able to drive for 24 hrs if Anesthesia was used.   ____  Females (ages 11-60): may need to give a urine sample the morning of surgery; please see Pre op Nurse prior to using the restroom.  ____  Wear clean, loose fitting clothing to allow for dressings/ bandages.    Bathing Instructions:    -Shower with anti-bacterial Soap (ex: Hibiclens or Dial) the night before surgery and the morning of.   -Do not use Hibiclens on your face or genitals.   -Apply clean clothes after shower.  -Do not shave your face morning of surgery   -Do not shave your body 3 days prior to surgery unless instructed otherwise by your Surgeon.    [facility]/Ride Policy:  Only 2 adults allowed in pre op/recovery area during your procedure. You MUST HAVE A RIDE HOME from a responsible adult that you know and trust. Medical Transport, Uber or Lyft can ONLY be used if patient has a responsible adult to accompany them during ride home.       *Signs and symptoms of Infection Before or After Surgery:               !!!If you experience any fever, chills, nausea/ vomiting, foul odor/ excessive drainage from surgical site, flu-like symptoms, new wounds or cuts, PLEASE CALL THE SURGEON OFFICE at [phone] or SEND MESSAGE THROUGH Simple!!!     *If you are running late day of surgery, please call the Surgery Dept @ [phone].    *Billing question, please  call  [phone]     Thank you,  -[facility] Pre Admit Dept.  [phone]   or [phone]  M-F 7:30 am-4:00 pm (Closed Major Holidays)    Additional Tests Scheduled Today:  EKG (4TH Floor) AND LABS (1ST Floor) Check in at the

## 2025-05-24 PROBLEM — E11.9 DIABETES MELLITUS: Status: ACTIVE | Noted: 2025-05-24

## 2025-05-24 PROBLEM — E11.9 DIABETES MELLITUS: Status: RESOLVED | Noted: 2025-05-24 | Resolved: 2025-05-24

## 2025-05-24 NOTE — ASSESSMENT & PLAN NOTE
HbA1c 7.7 on 5/23/2025 and glucose 190 (non-fasting) on CMP on 5/23/2025  -patient notified and reports DM usually controlled by diet and exercise and confirms recent noncompliance dietary restrictions   -patient advised to follow ADA diet and increase exercise   -patient to follow up with PCP outpatient for further evaluation and repeat lab analysis

## 2025-05-28 NOTE — PRE-PROCEDURE INSTRUCTIONS
Called and spoke with PATIENT about the following:     Please arrive to [facility] ([facility] at 7:30AM on 5/29/25 for your scheduled procedure.  Address: [address] [place] [address] [place] [address] (2nd Building on left, 1st Floor Lobby)    !!!NO FOOD after midnight! You may have clear liquids up to 3 hrs before your arrival to the Hospital!!!  Clear liquids include Gatorade, water, soda, black coffee or tea (no milk or creamer), and clear juices.  Clear liquids do NOT include anything with pulp or food particles (Chicken broth, ice cream, yogurt, Jello, etc.)    Thank you,  -[facility] Pre Admit Dept.  Mon-Fri 7:30 am - 4 pm [phone]

## 2025-05-29 ENCOUNTER — ANESTHESIA EVENT (OUTPATIENT)
Dept: SURGERY | Facility: HOSPITAL | Age: 45
End: 2025-05-29
Payer: COMMERCIAL

## 2025-05-29 ENCOUNTER — HOSPITAL ENCOUNTER (OUTPATIENT)
Facility: HOSPITAL | Age: 45
Discharge: HOME | End: 2025-05-29
Attending: ORTHOPAEDIC SURGERY | Admitting: ORTHOPAEDIC SURGERY
Payer: COMMERCIAL

## 2025-05-29 ENCOUNTER — ANESTHESIA (OUTPATIENT)
Dept: SURGERY | Facility: HOSPITAL | Age: 45
End: 2025-05-29
Payer: COMMERCIAL

## 2025-05-29 VITALS
HEART RATE: 72 BPM | OXYGEN SATURATION: 98 % | BODY MASS INDEX: 26.6 KG/M2 | RESPIRATION RATE: 16 BRPM | TEMPERATURE: 98 F | DIASTOLIC BLOOD PRESSURE: 80 MMHG | WEIGHT: 155 LBS | SYSTOLIC BLOOD PRESSURE: 137 MMHG

## 2025-05-29 DIAGNOSIS — G56.00 CARPAL TUNNEL SYNDROME: ICD-10-CM

## 2025-05-29 DIAGNOSIS — G56.02 CARPAL TUNNEL SYNDROME OF LEFT WRIST: Primary | ICD-10-CM

## 2025-05-29 DIAGNOSIS — Z01.818 PREOP TESTING: ICD-10-CM

## 2025-05-29 PROCEDURE — 36000707: Performed by: ORTHOPAEDIC SURGERY

## 2025-05-29 PROCEDURE — 37000009 HC ANESTHESIA EA ADD 15 MINS: Performed by: ORTHOPAEDIC SURGERY

## 2025-05-29 PROCEDURE — 64721 CARPAL TUNNEL SURGERY: CPT | Mod: LT,,, | Performed by: ORTHOPAEDIC SURGERY

## 2025-05-29 PROCEDURE — 37000008 HC ANESTHESIA 1ST 15 MINUTES: Performed by: ORTHOPAEDIC SURGERY

## 2025-05-29 PROCEDURE — 25000003 PHARM REV CODE 250: Performed by: ANESTHESIOLOGY

## 2025-05-29 PROCEDURE — 63600175 PHARM REV CODE 636 W HCPCS: Performed by: ORTHOPAEDIC SURGERY

## 2025-05-29 PROCEDURE — 63600175 PHARM REV CODE 636 W HCPCS: Performed by: ANESTHESIOLOGY

## 2025-05-29 PROCEDURE — 36000706: Performed by: ORTHOPAEDIC SURGERY

## 2025-05-29 PROCEDURE — 71000033 HC RECOVERY, INTIAL HOUR: Performed by: ORTHOPAEDIC SURGERY

## 2025-05-29 PROCEDURE — 71000015 HC POSTOP RECOV 1ST HR: Performed by: ORTHOPAEDIC SURGERY

## 2025-05-29 RX ORDER — HYDROMORPHONE HYDROCHLORIDE 2 MG/ML
0.2 INJECTION, SOLUTION INTRAMUSCULAR; INTRAVENOUS; SUBCUTANEOUS EVERY 5 MIN PRN
Status: DISCONTINUED | OUTPATIENT
Start: 2025-05-29 | End: 2025-05-29 | Stop reason: HOSPADM

## 2025-05-29 RX ORDER — OXYCODONE AND ACETAMINOPHEN 5; 325 MG/1; MG/1
1 TABLET ORAL
Status: DISCONTINUED | OUTPATIENT
Start: 2025-05-29 | End: 2025-05-29 | Stop reason: HOSPADM

## 2025-05-29 RX ORDER — MIDAZOLAM HYDROCHLORIDE 1 MG/ML
INJECTION INTRAMUSCULAR; INTRAVENOUS
Status: DISCONTINUED | OUTPATIENT
Start: 2025-05-29 | End: 2025-05-29

## 2025-05-29 RX ORDER — CLINDAMYCIN PHOSPHATE 900 MG/50ML
900 INJECTION, SOLUTION INTRAVENOUS
Status: DISCONTINUED | OUTPATIENT
Start: 2025-05-29 | End: 2025-05-29 | Stop reason: HOSPADM

## 2025-05-29 RX ORDER — SODIUM CHLORIDE 0.9 % (FLUSH) 0.9 %
10 SYRINGE (ML) INJECTION
Status: DISCONTINUED | OUTPATIENT
Start: 2025-05-29 | End: 2025-05-29 | Stop reason: HOSPADM

## 2025-05-29 RX ORDER — CHLORHEXIDINE GLUCONATE ORAL RINSE 1.2 MG/ML
10 SOLUTION DENTAL
Status: DISCONTINUED | OUTPATIENT
Start: 2025-05-29 | End: 2025-05-29 | Stop reason: HOSPADM

## 2025-05-29 RX ORDER — FENTANYL CITRATE 50 UG/ML
INJECTION, SOLUTION INTRAMUSCULAR; INTRAVENOUS
Status: DISCONTINUED | OUTPATIENT
Start: 2025-05-29 | End: 2025-05-29

## 2025-05-29 RX ORDER — LIDOCAINE HYDROCHLORIDE 20 MG/ML
INJECTION, SOLUTION EPIDURAL; INFILTRATION; INTRACAUDAL; PERINEURAL
Status: DISCONTINUED | OUTPATIENT
Start: 2025-05-29 | End: 2025-05-29 | Stop reason: HOSPADM

## 2025-05-29 RX ORDER — HYDROCODONE BITARTRATE AND ACETAMINOPHEN 5; 325 MG/1; MG/1
1 TABLET ORAL EVERY 6 HOURS PRN
Qty: 15 TABLET | Refills: 0 | Status: SHIPPED | OUTPATIENT
Start: 2025-05-29

## 2025-05-29 RX ORDER — KETOROLAC TROMETHAMINE 30 MG/ML
15 INJECTION, SOLUTION INTRAMUSCULAR; INTRAVENOUS EVERY 8 HOURS PRN
Status: DISCONTINUED | OUTPATIENT
Start: 2025-05-29 | End: 2025-05-29 | Stop reason: HOSPADM

## 2025-05-29 RX ORDER — HYDROCODONE BITARTRATE AND ACETAMINOPHEN 5; 325 MG/1; MG/1
1 TABLET ORAL EVERY 4 HOURS PRN
Status: DISCONTINUED | OUTPATIENT
Start: 2025-05-29 | End: 2025-05-29 | Stop reason: HOSPADM

## 2025-05-29 RX ORDER — CHLORHEXIDINE GLUCONATE ORAL RINSE 1.2 MG/ML
10 SOLUTION DENTAL 2 TIMES DAILY
Status: DISCONTINUED | OUTPATIENT
Start: 2025-05-29 | End: 2025-05-29 | Stop reason: HOSPADM

## 2025-05-29 RX ORDER — PROPOFOL 10 MG/ML
VIAL (ML) INTRAVENOUS CONTINUOUS PRN
Status: DISCONTINUED | OUTPATIENT
Start: 2025-05-29 | End: 2025-05-29

## 2025-05-29 RX ORDER — ONDANSETRON HYDROCHLORIDE 2 MG/ML
4 INJECTION, SOLUTION INTRAVENOUS DAILY PRN
Status: DISCONTINUED | OUTPATIENT
Start: 2025-05-29 | End: 2025-05-29 | Stop reason: HOSPADM

## 2025-05-29 RX ADMIN — FENTANYL CITRATE 50 MCG: 50 INJECTION, SOLUTION INTRAMUSCULAR; INTRAVENOUS at 09:05

## 2025-05-29 RX ADMIN — PROPOFOL 100 MCG/KG/MIN: 10 INJECTION, EMULSION INTRAVENOUS at 09:05

## 2025-05-29 RX ADMIN — GLYCOPYRROLATE 0.2 MG: 0.2 INJECTION, SOLUTION INTRAMUSCULAR; INTRAVITREAL at 09:05

## 2025-05-29 RX ADMIN — OXYCODONE HYDROCHLORIDE AND ACETAMINOPHEN 1 TABLET: 5; 325 TABLET ORAL at 10:05

## 2025-05-29 RX ADMIN — MIDAZOLAM HYDROCHLORIDE 2 MG: 1 INJECTION, SOLUTION INTRAMUSCULAR; INTRAVENOUS at 09:05

## 2025-05-29 NOTE — H&P
Subjective:     Patient ID: Duane Romero is a 44 y.o. female.    Chief Complaint: No chief complaint on file.      HPI:  The patient is a 44-year-old female with bilateral carpal tunnel syndrome last injected 2025 with good relief until recently.  She wishes to have a left carpal tunnel release.  She did have positive nerve conduction studies.  She wishes left elbow lateral epicondylitis injection today as well as left carpal tunnel injection today.  She was last injected 2025 with good relief until recently.    Past Medical History:   Diagnosis Date    ADHD (attention deficit hyperactivity disorder)     Allergy     Asthma     Diabetes mellitus 2025    Hyperlipidemia     Otitis media     Prediabetes     Sinusitis      Past Surgical History:   Procedure Laterality Date    ADENOIDECTOMY       SECTION      CYST REMOVAL Right     Under right arm    DILATION AND CURETTAGE OF UTERUS      HERNIA REPAIR  2022    HYSTERECTOMY  2021    FRANCO/BS    IMPLANTATION OF BONE ANCHORED HEARING AID (BAHA) Bilateral 2022    Procedure: INSERTION, BAHA;  Surgeon: Jeff Kelley MD;  Location: [facility] OR;  Service: ENT;  Laterality: Bilateral;    TONSILLECTOMY      TUBAL LIGATION      TYMPANOPLASTY      TYMPANOSTOMY TUBE PLACEMENT      UMBILICAL HERNIA REPAIR N/A 2022    Procedure: REPAIR, HERNIA, UMBILICAL, AGE 5 YEARS OR OLDER;  Surgeon: Philipp Paiz MD;  Location: [facility] OR;  Service: General;  Laterality: N/A;     Family History   Problem Relation Name Age of Onset    Hyperlipidemia Mother Mom     Diabetes Father Father         DM2 insulin pump & oral    Hypertension Father Father     Obesity Father Father     Breast cancer Maternal Aunt Aunt     Cancer Maternal Aunt Aunt         Skin cancer    Thyroid disease Maternal Grandmother Rima rondon     Migraines Neg Hx      Asthma Neg Hx       Social History[1]  Medication List with Changes/Refills   Current Medications    AZELASTINE  (ASTELIN) 137 MCG (0.1 %) NASAL SPRAY    1 spray (137 mcg total) by Nasal route 2 (two) times daily.    CLOTRIMAZOLE (LOTRIMIN) 1 % CREAM    Apply topically 2 (two) times daily.    DEXTROAMPHETAMINE-AMPHETAMINE (ADDERALL XR) 30 MG 24 HR CAPSULE    Take by mouth every morning.    EPINEPHRINE (AUVI-Q) 0.3 MG/0.3 ML ATIN    Inject 0.3 mLs (0.3 mg total) into the muscle once. for 1 dose    FLUTICASONE PROPIONATE (FLONASE) 50 MCG/ACTUATION NASAL SPRAY    USE 1 SPRAY (50 MCG TOTAL) IN EACH NOSTRIL ONCE DAILY    MONTELUKAST (SINGULAIR) 10 MG TABLET    TAKE 1 TABLET BY MOUTH EVERY DAY IN THE EVENING     Review of patient's allergies indicates:   Allergen Reactions    Celestone [betamethasone]      Allergy testing.    Paraffin     Pcn [penicillins] Itching     Injection only.  Can tolerate oral form    Prednisone      Other reaction(s): increased heart rate     Review of Systems   Constitutional: Negative for malaise/fatigue.   HENT:  Positive for hearing loss.    Eyes:  Negative for double vision and visual disturbance.   Cardiovascular:  Positive for dyspnea on exertion and palpitations. Negative for chest pain.   Respiratory:  Negative for shortness of breath.    Endocrine: Negative for cold intolerance.   Hematologic/Lymphatic: Does not bruise/bleed easily.   Skin:  Negative for poor wound healing and suspicious lesions.   Musculoskeletal:  Negative for gout, joint pain and joint swelling.   Gastrointestinal:  Negative for nausea and vomiting.   Genitourinary:  Negative for dysuria.   Neurological:  Positive for numbness, paresthesias and sensory change.   Psychiatric/Behavioral:  Positive for altered mental status. Negative for depression, memory loss and substance abuse. The patient is nervous/anxious.    Allergic/Immunologic: Negative for persistent infections.       Objective:   There is no height or weight on file to calculate BMI.  There were no vitals filed for this visit.             General    Constitutional:  She is oriented to person, place, and time. She appears well-developed and well-nourished. No distress.   HENT:   Head: Normocephalic. Mouth/Throat: Oropharynx is clear and moist.   Eyes: EOM are normal.   Cardiovascular:  Normal rate.            Pulmonary/Chest: Effort normal.   Abdominal: Soft.   Neurological: She is alert and oriented to person, place, and time. No cranial nerve deficit.   Psychiatric: She has a normal mood and affect.             Right Hand/Wrist Exam     Inspection   Scars: Wrist - absent Hand -  absent  Effusion: Wrist - absent Hand -  absent    Pain   Wrist - The patient exhibits pain of the flexor/pronator group.    Tests   Phalens sign: positive  Tinel's sign (median nerve): positive  Carpal Tunnel Compression Test: positive    Atrophy   Thenar:  negative  Intrinsic:  negative    Other     Neuorologic Exam    Median Distribution: abnormal  Ulnar Distribution: normal  Radial Distribution: normal    Comments:  The patient has a positive Tinel and positive Phalen sign.  There is no thenar atrophy noted.      Left Hand/Wrist Exam     Inspection   Scars: Wrist - absent Hand -  absent  Effusion: Wrist - absent Hand -  absent    Pain   Wrist - The patient exhibits pain of the flexor/pronator group.    Tests   Phalens sign: positive  Tinel's sign (median nerve): positive  Carpal Tunnel Compression Test: positive    Atrophy  Thenar:  Negative  Intrinsic: negative    Other     Sensory Exam  Median Distribution: abnormal  Ulnar Distribution: normal  Radial Distribution: normal    Comments:  The patient has a positive Tinel and positive Phalen sign.  There is no thenar atrophy noted.      Left Elbow Exam     Inspection   Scars: absent  Effusion: absent    Pain   The patient exhibits pain of the lateral epicondyle    Comments:  The patient has tenderness left elbow lateral epicondyle with pain on resisted wrist and finger extension.        Vascular Exam       Capillary Refill  Right Hand: normal  capillary refill  Left Hand: normal capillary refill         radiographs were not obtained today  Assessment:     Encounter Diagnosis   Name Primary?    Bilateral carpal tunnel syndrome Yes        Plan:       The patient was counseled regarding a left carpal tunnel release.  Risk complications and alternatives were discussed including the risk of infection, anesthetic risk, injury to nerves and vessels, loss of motion, and possible need for additional surgeries were discussed.  She seems to understand and agree to that surgery.  All questions were answered.  The patient was injected left carpal tunnel with 1 cc Kenalog and 1 cc 2% plain lidocaine under sterile technique.  She was injected left elbow lateral epicondyle with 1 cc Kenalog and 1 cc 2% plain lidocaine under sterile technique.  She will wait at least 3 months between injections.              Disclaimer: This note was prepared using a voice recognition system and is likely to have sound alike errors within the text.          [1]   Social History  Socioeconomic History    Marital status:    Tobacco Use    Smoking status: Never    Smokeless tobacco: Never   Substance and Sexual Activity    Alcohol use: Yes     Comment: ocassional - no ETOH 72 hours before surgery    Drug use: Never    Sexual activity: Not Currently     Partners: Male     Birth control/protection: None     Comment: I had a partial hysterectomy 5-   Social History Narrative    Patient goal(s): Weight of 130 lbs    Motivation: Stay healthy (prevent dz/need for rx) to enjoy outdoor activities with family or by herself    Breakfast: 1-2 sausages & 1-2 eggs with 1 slice bread; water & coffee w/ Stevia & sugar-free creamer    Lunch: Boneless chicken wings & 1 thin slice of cheesecake or 1 turkey sandwich on white bread, 1 slice of American cheese; water    Dinner: Hamburger (90:10 or 80:20 meat) with cheese & bread or chicken; water or wine 5.5 oz    Snacks: Apples or 1-2 small bag of  chips daily (160 jamaal)    Eating out: 3-4x/wk    Water (oz/day): 102 oz/d    Physical Activity: 35-50 min/d of resistance/aerobic & 4-5d/wk     Strategies: Lose it radha (smaller portions; lower carb)     Social Drivers of Health     Financial Resource Strain: Low Risk  (5/8/2025)    Overall Financial Resource Strain (CARDIA)     Difficulty of Paying Living Expenses: Not hard at all   Food Insecurity: No Food Insecurity (5/8/2025)    Hunger Vital Sign     Worried About Running Out of Food in the Last Year: Never true     Ran Out of Food in the Last Year: Never true   Transportation Needs: No Transportation Needs (5/8/2025)    PRAPARE - Transportation     Lack of Transportation (Medical): No     Lack of Transportation (Non-Medical): No   Physical Activity: Sufficiently Active (5/8/2025)    Exercise Vital Sign     Days of Exercise per Week: 6 days     Minutes of Exercise per Session: 60 min   Stress: No Stress Concern Present (5/8/2025)    [facility] [facility] - Occupational Stress Questionnaire     Feeling of Stress : Only a little   Housing Stability: High Risk (5/8/2025)    Housing Stability Vital Sign     Unable to Pay for Housing in the Last Year: Yes     Number of Times Moved in the Last Year: 1     Homeless in the Last Year: No

## 2025-05-29 NOTE — DISCHARGE SUMMARY
[facility] - Surgery (Hospital)  Discharge Note  Short Stay    Procedure(s) (LRB):  RELEASE, CARPAL TUNNEL (Left)      OUTCOME: Patient tolerated treatment/procedure well without complication and is now ready for discharge.    DISPOSITION: Home or Self Care    FINAL DIAGNOSIS:  Left carpal tunnel syndrome    FOLLOWUP: In clinic    DISCHARGE INSTRUCTIONS:    Discharge Procedure Orders   CBC Auto Differential   Standing Status: Future Number of Occurrences: 1 Standing Exp. Date: 07/22/26     Order Specific Question Answer Comments   Send normal result to authorizing provider's In Basket if patient is active on MyChart: Yes      Comprehensive Metabolic Panel   Standing Status: Future Number of Occurrences: 1 Standing Exp. Date: 07/22/26     Diet general     Call MD for:  temperature >100.4     Call MD for:  persistent nausea and vomiting     Call MD for:  severe uncontrolled pain     Call MD for:  difficulty breathing, headache or visual disturbances     Call MD for:  redness, tenderness, or signs of infection (pain, swelling, redness, odor or green/yellow discharge around incision site)     Call MD for:  hives     Call MD for:  persistent dizziness or light-headedness     Call MD for:  extreme fatigue     EKG 12-lead   Standing Status: Future Number of Occurrences: 1 Standing Exp. Date: 05/23/26        TIME SPENT ON DISCHARGE:  20 minutes

## 2025-05-29 NOTE — ANESTHESIA POSTPROCEDURE EVALUATION
Anesthesia Post Evaluation    Patient: Duane Romero    Procedure(s) Performed: Procedure(s) (LRB):  RELEASE, CARPAL TUNNEL (Left)    Final Anesthesia Type: MAC      Patient location during evaluation: PACU  Patient participation: Yes- Able to Participate  Level of consciousness: awake and alert and oriented  Post-procedure vital signs: reviewed and stable  Pain management: adequate  Airway patency: patent  SANTOS mitigation strategies: Multimodal analgesia  PONV status at discharge: No PONV  Anesthetic complications: no      Cardiovascular status: blood pressure returned to baseline and hemodynamically stable  Respiratory status: unassisted  Hydration status: euvolemic  Follow-up not needed.              Vitals Value Taken Time   /80 05/29/25 10:15   Temp 36.8 °C (98.3 °F) 05/29/25 09:54   Pulse 72 05/29/25 10:21   Resp 21 05/29/25 10:18   SpO2 98 % 05/29/25 10:18   Vitals shown include unfiled device data.      Event Time   Out of Recovery 10:20:30         Pain/Candelaria Score: Pain Rating Prior to Med Admin: 5 (5/29/2025 10:11 AM)  Candelaria Score: 10 (5/29/2025 10:21 AM)

## 2025-05-29 NOTE — OP NOTE
[facility] - Surgery ([facility])  Orthopedic Surgery  Operative Note    SUMMARY     Date of Procedure: 5/29/2025   Assistant: None    Procedure: Procedure(s) (LRB):  RELEASE, CARPAL TUNNEL (Left)       Surgeons and Role:     * Amol Reinoso MD - Primary    Assisting Surgeon: None    Pre-Operative Diagnosis:  Left carpal tunnel syndrome    Post-Operative Diagnosis:  Left carpal tunnel syndrome    Anesthesia:  Local with sedation    Technical Procedures Used:  left carpal tunnel release    Description of the Findings of the Procedure:  The patient was taken to the operating room where 10 cc of 2% plain lidocaine was used for local anesthetic and was administered.  After exsanguination of the extremity a proximal tourniquet was inflated to 250 mm of mercury.  Satisfactory anesthesia had been achieved the left hand was prepped and draped in the usual sterile fashion.  The patient did receive 900 mg clindamycin intravenously preoperatively.  At this time a longitudinal incision was made in line with the 4th ray over the transverse carpal ligament.  The incision was extended using blunt and sharp dissection under 3.5 loupe magnification.  The transverse carpal ligament was identified and sharply incised under direct vision.  A complete release was performed and verified by the surgeon's small finger both proximally and distally.  No additional pathology was encountered.  Satisfactory release had been achieved skin was closed using horizontal mattress 4 0 nylon suture.  Xeroform gauze 4x4s and 2 ABD pads were over wrapped with 3 in gauze dressing.  The patient tolerated the procedure well and was transferred to the recovery room in satisfactory condition.      Complications: No    Estimated Blood Loss (EBL): 5cc                        Condition: Good    Disposition: PACU - hemodynamically stable.    Attestation: I was present and scrubbed for the entire procedure.

## 2025-05-29 NOTE — ANESTHESIA PREPROCEDURE EVALUATION
2025  Duane Romero is a 44 y.o., female.    Past Medical History:   Diagnosis Date    ADHD (attention deficit hyperactivity disorder)     Allergy     Asthma     Diabetes mellitus 2025    Hyperlipidemia     Otitis media     Prediabetes     Sinusitis      Past Surgical History:   Procedure Laterality Date    ADENOIDECTOMY       SECTION      CYST REMOVAL Right     Under right arm    DILATION AND CURETTAGE OF UTERUS      HERNIA REPAIR  2022    HYSTERECTOMY  2021    FRANCO/BS    IMPLANTATION OF BONE ANCHORED HEARING AID (BAHA) Bilateral 2022    Procedure: INSERTION, BAHA;  Surgeon: Jeff Kelley MD;  Location: [facility];  Service: ENT;  Laterality: Bilateral;    TONSILLECTOMY      TUBAL LIGATION      TYMPANOPLASTY      TYMPANOSTOMY TUBE PLACEMENT      UMBILICAL HERNIA REPAIR N/A 2022    Procedure: REPAIR, HERNIA, UMBILICAL, AGE 5 YEARS OR OLDER;  Surgeon: Philipp Paiz MD;  Location: [facility];  Service: General;  Laterality: N/A;       Pre-op Assessment    I have reviewed the Patient Summary Reports.    I have reviewed the NPO Status.   I have reviewed the Medications.     Review of Systems  Anesthesia Hx:  No problems with previous Anesthesia                Social:  Non-Smoker       Hematology/Oncology:  Hematology Normal                                     Cardiovascular:                hyperlipidemia                               Pulmonary:    Asthma  Shortness of breath                  Renal/:  Renal/ Normal                 Hepatic/GI:  Hepatic/GI Normal                    Neurological:  Neurology Normal                                      Endocrine:  Diabetes   Metabolic syndrome            Physical Exam  General: Well nourished    Airway:  Mallampati: II   Mouth Opening: Normal  TM Distance: Normal  Neck ROM: Normal ROM    Dental:  Intact        Anesthesia  Plan  Type of Anesthesia, risks & benefits discussed:    Anesthesia Type: MAC  Intra-op Monitoring Plan: Standard ASA Monitors  Post Op Pain Control Plan: multimodal analgesia  Induction:  IV  Airway Plan: , Post-Induction  Informed Consent: Informed consent signed with the Patient and all parties understand the risks and agree with anesthesia plan.  All questions answered.   ASA Score: 3    Ready For Surgery From Anesthesia Perspective.     .      Chemistry        Component Value Date/Time     05/23/2025 1140     04/05/2024 0800    K 3.9 05/23/2025 1140    K 4.3 04/05/2024 0800     05/23/2025 1140     04/05/2024 0800    CO2 27 05/23/2025 1140    CO2 23 04/05/2024 0800    BUN 14 05/23/2025 1140    CREATININE 0.7 05/23/2025 1140     (H) 05/23/2025 1140     (H) 04/05/2024 0800        Component Value Date/Time    CALCIUM 9.4 05/23/2025 1140    CALCIUM 9.0 04/05/2024 0800    ALKPHOS 70 05/23/2025 1140    ALKPHOS 68 10/11/2023 1145    AST 10 (L) 05/23/2025 1140    AST 13 10/11/2023 1145    ALT 19 05/23/2025 1140    ALT 16 10/11/2023 1145    BILITOT 0.6 05/23/2025 1140    BILITOT 0.4 10/11/2023 1145    ESTGFRAFRICA >60.0 06/06/2022 0810    EGFRNONAA >60.0 06/06/2022 0810        Lab Results   Component Value Date    WBC 6.67 05/23/2025    HGB 15.5 05/23/2025    HCT 43.2 05/23/2025    MCV 88 05/23/2025     05/23/2025

## 2025-05-29 NOTE — TRANSFER OF CARE
Anesthesia Transfer of Care Note    Patient: Duane Romero    Procedure(s) Performed: Procedure(s) (LRB):  RELEASE, CARPAL TUNNEL (Left)    Patient location: PACU    Anesthesia Type: MAC    Transport from OR: Transported from OR on room air with adequate spontaneous ventilation    Post pain: adequate analgesia    Post assessment: no apparent anesthetic complications and tolerated procedure well    Post vital signs: stable    Level of consciousness: awake    Nausea/Vomiting: no nausea/vomiting    Complications: none    Transfer of care protocol was followed      Last vitals: Visit Vitals  /80 (BP Location: Right arm, Patient Position: Lying)   Pulse 77   Temp 36.8 °C (98.3 °F) (Temporal)   Resp 14   Wt 70.3 kg (154 lb 15.7 oz)   LMP 05/30/2021   SpO2 95%   Breastfeeding No   BMI 26.60 kg/m²

## 2025-06-03 ENCOUNTER — OFFICE VISIT (OUTPATIENT)
Dept: ORTHOPEDICS | Facility: CLINIC | Age: 45
End: 2025-06-03
Payer: COMMERCIAL

## 2025-06-03 VITALS — WEIGHT: 155 LBS | HEIGHT: 64 IN | BODY MASS INDEX: 26.46 KG/M2

## 2025-06-03 DIAGNOSIS — G56.03 BILATERAL CARPAL TUNNEL SYNDROME: Primary | ICD-10-CM

## 2025-06-03 PROCEDURE — 1160F RVW MEDS BY RX/DR IN RCRD: CPT | Mod: CPTII,S$GLB,, | Performed by: ORTHOPAEDIC SURGERY

## 2025-06-03 PROCEDURE — 99999 PR PBB SHADOW E&M-EST. PATIENT-LVL III: CPT | Mod: PBBFAC,,, | Performed by: ORTHOPAEDIC SURGERY

## 2025-06-03 PROCEDURE — 3051F HG A1C>EQUAL 7.0%<8.0%: CPT | Mod: CPTII,S$GLB,, | Performed by: ORTHOPAEDIC SURGERY

## 2025-06-03 PROCEDURE — 99024 POSTOP FOLLOW-UP VISIT: CPT | Mod: S$GLB,,, | Performed by: ORTHOPAEDIC SURGERY

## 2025-06-03 PROCEDURE — 1159F MED LIST DOCD IN RCRD: CPT | Mod: CPTII,S$GLB,, | Performed by: ORTHOPAEDIC SURGERY

## 2025-06-11 ENCOUNTER — OFFICE VISIT (OUTPATIENT)
Dept: ORTHOPEDICS | Facility: CLINIC | Age: 45
End: 2025-06-11
Payer: COMMERCIAL

## 2025-06-11 VITALS — WEIGHT: 155 LBS | BODY MASS INDEX: 26.46 KG/M2 | HEIGHT: 64 IN

## 2025-06-11 DIAGNOSIS — G56.03 BILATERAL CARPAL TUNNEL SYNDROME: Primary | ICD-10-CM

## 2025-06-11 PROCEDURE — 1159F MED LIST DOCD IN RCRD: CPT | Mod: CPTII,S$GLB,, | Performed by: ORTHOPAEDIC SURGERY

## 2025-06-11 PROCEDURE — 3051F HG A1C>EQUAL 7.0%<8.0%: CPT | Mod: CPTII,S$GLB,, | Performed by: ORTHOPAEDIC SURGERY

## 2025-06-11 PROCEDURE — 99024 POSTOP FOLLOW-UP VISIT: CPT | Mod: S$GLB,,, | Performed by: ORTHOPAEDIC SURGERY

## 2025-06-11 PROCEDURE — 99999 PR PBB SHADOW E&M-EST. PATIENT-LVL II: CPT | Mod: PBBFAC,,, | Performed by: ORTHOPAEDIC SURGERY

## 2025-06-11 PROCEDURE — 1160F RVW MEDS BY RX/DR IN RCRD: CPT | Mod: CPTII,S$GLB,, | Performed by: ORTHOPAEDIC SURGERY

## 2025-06-11 NOTE — PROGRESS NOTES
Subjective:     Patient ID: Duane Romero is a 44 y.o. female.    Chief Complaint: Post-op Evaluation of the Left Wrist (PO 2 left CTR) and Follow-up      HPI:  The patient is a 44-year-old female seen in follow-up after a left carpal tunnel release date of surgery was 2025.  She seems to be doing well.    Past Medical History:   Diagnosis Date    ADHD (attention deficit hyperactivity disorder)     Allergy     Asthma     Diabetes mellitus 2025    Hyperlipidemia     Otitis media     Prediabetes     Sinusitis      Past Surgical History:   Procedure Laterality Date    ADENOIDECTOMY      CARPAL TUNNEL RELEASE Left 2025    Procedure: RELEASE, CARPAL TUNNEL;  Surgeon: Amol Reinoso MD;  Location: [facility] OR;  Service: Orthopedics;  Laterality: Left;     SECTION      CYST REMOVAL Right     Under right arm    DILATION AND CURETTAGE OF UTERUS      HERNIA REPAIR  2022    HYSTERECTOMY  2021    FRANCO/BS    IMPLANTATION OF BONE ANCHORED HEARING AID (BAHA) Bilateral 2022    Procedure: INSERTION, BAHA;  Surgeon: Jeff Kelley MD;  Location: [facility] OR;  Service: ENT;  Laterality: Bilateral;    TONSILLECTOMY      TUBAL LIGATION      TYMPANOPLASTY      TYMPANOSTOMY TUBE PLACEMENT      UMBILICAL HERNIA REPAIR N/A 2022    Procedure: REPAIR, HERNIA, UMBILICAL, AGE 5 YEARS OR OLDER;  Surgeon: Philipp Paiz MD;  Location: [facility] OR;  Service: General;  Laterality: N/A;     Family History   Problem Relation Name Age of Onset    Hyperlipidemia Mother Mom     Diabetes Father Father         DM2 insulin pump & oral    Hypertension Father Father     Obesity Father Father     Breast cancer Maternal Aunt Aunt     Cancer Maternal Aunt Aunt         Skin cancer    Thyroid disease Maternal Grandmother Rima alcon     Migraines Neg Hx      Asthma Neg Hx       Social History[1]  Medication List with Changes/Refills   Current Medications    AZELASTINE (ASTELIN) 137 MCG (0.1 %) NASAL SPRAY    1  spray (137 mcg total) by Nasal route 2 (two) times daily.    CLOTRIMAZOLE (LOTRIMIN) 1 % CREAM    Apply topically 2 (two) times daily.    DEXTROAMPHETAMINE-AMPHETAMINE (ADDERALL XR) 30 MG 24 HR CAPSULE    Take by mouth every morning.    EPINEPHRINE (AUVI-Q) 0.3 MG/0.3 ML ATIN    Inject 0.3 mLs (0.3 mg total) into the muscle once. for 1 dose    FLUTICASONE PROPIONATE (FLONASE) 50 MCG/ACTUATION NASAL SPRAY    USE 1 SPRAY (50 MCG TOTAL) IN EACH NOSTRIL ONCE DAILY    HYDROCODONE-ACETAMINOPHEN (NORCO) 5-325 MG PER TABLET    Take 1 tablet by mouth every 6 (six) hours as needed for Pain.    MONTELUKAST (SINGULAIR) 10 MG TABLET    TAKE 1 TABLET BY MOUTH EVERY DAY IN THE EVENING     Review of patient's allergies indicates:   Allergen Reactions    Celestone [betamethasone]      Allergy testing.    Paraffin     Pcn [penicillins] Itching     Injection only.  Can tolerate oral form    Prednisone      Other reaction(s): increased heart rate     Review of Systems   Constitutional: Negative for malaise/fatigue.   HENT:  Positive for hearing loss.    Eyes:  Negative for double vision and visual disturbance.   Cardiovascular:  Positive for dyspnea on exertion and palpitations. Negative for chest pain.   Respiratory:  Negative for shortness of breath.    Endocrine: Negative for cold intolerance.   Hematologic/Lymphatic: Does not bruise/bleed easily.   Skin:  Negative for poor wound healing and suspicious lesions.   Musculoskeletal:  Negative for gout, joint pain and joint swelling.   Gastrointestinal:  Negative for nausea and vomiting.   Genitourinary:  Negative for dysuria.   Neurological:  Positive for numbness, paresthesias and sensory change.   Psychiatric/Behavioral:  Positive for altered mental status. Negative for depression, memory loss and substance abuse. The patient is nervous/anxious.    Allergic/Immunologic: Negative for persistent infections.       Objective:   Body mass index is 26.6 kg/m².  There were no vitals filed  for this visit.             General    Constitutional: She is oriented to person, place, and time. She appears well-developed and well-nourished. No distress.   HENT:   Head: Normocephalic.   Eyes: EOM are normal.   Pulmonary/Chest: Effort normal.   Neurological: She is oriented to person, place, and time.   Psychiatric: She has a normal mood and affect.         Left Hand/Wrist Exam     Inspection   Scars: Wrist - present Hand -  present  Effusion: Wrist - absent Hand -  absent    Other     Sensory Exam  Median Distribution: normal  Ulnar Distribution: normal  Radial Distribution: normal    Comments:  The suture line is intact left carpal tunnel incision.  There is no sign of infection.  There are no motor or sensory deficits.          Vascular Exam       Capillary Refill  Left Hand: normal capillary refill        radiographs were not obtained today  Assessment:     Encounter Diagnosis   Name Primary?    Bilateral carpal tunnel syndrome Yes    Status post left carpal tunnel release    Plan:     The patient had the sutures removed today.  Steri-Strips were applied.  Wound care was discussed.  She seems to be doing well and will return on a as needed basis.                Disclaimer: This note was prepared using a voice recognition system and is likely to have sound alike errors within the text.          [1]   Social History  Socioeconomic History    Marital status:    Tobacco Use    Smoking status: Never    Smokeless tobacco: Never   Substance and Sexual Activity    Alcohol use: Yes     Comment: ocassional - no ETOH 72 hours before surgery    Drug use: Never    Sexual activity: Not Currently     Partners: Male     Birth control/protection: None     Comment: I had a partial hysterectomy 5-   Social History Narrative    Patient goal(s): Weight of 130 lbs    Motivation: Stay healthy (prevent dz/need for rx) to enjoy outdoor activities with family or by herself    Breakfast: 1-2 sausages & 1-2 eggs with 1  slice bread; water & coffee w/ Stevia & sugar-free creamer    Lunch: Boneless chicken wings & 1 thin slice of cheesecake or 1 turkey sandwich on white bread, 1 slice of American cheese; water    Dinner: Hamburger (90:10 or 80:20 meat) with cheese & bread or chicken; water or wine 5.5 oz    Snacks: Apples or 1-2 small bag of chips daily (160 jamaal)    Eating out: 3-4x/wk    Water (oz/day): 102 oz/d    Physical Activity: 35-50 min/d of resistance/aerobic & 4-5d/wk     Strategies: Lose it radha (smaller portions; lower carb)     Social Drivers of Health     Financial Resource Strain: Low Risk  (5/8/2025)    Overall Financial Resource Strain (CARDIA)     Difficulty of Paying Living Expenses: Not hard at all   Food Insecurity: No Food Insecurity (5/8/2025)    Hunger Vital Sign     Worried About Running Out of Food in the Last Year: Never true     Ran Out of Food in the Last Year: Never true   Transportation Needs: No Transportation Needs (5/8/2025)    PRAPARE - Transportation     Lack of Transportation (Medical): No     Lack of Transportation (Non-Medical): No   Physical Activity: Sufficiently Active (5/8/2025)    Exercise Vital Sign     Days of Exercise per Week: 6 days     Minutes of Exercise per Session: 60 min   Stress: No Stress Concern Present (5/8/2025)    [employer] - Occupational Stress Questionnaire     Feeling of Stress : Only a little   Housing Stability: High Risk (5/8/2025)    Housing Stability Vital Sign     Unable to Pay for Housing in the Last Year: Yes     Number of Times Moved in the Last Year: 1     Homeless in the Last Year: No

## 2025-06-20 ENCOUNTER — TELEPHONE (OUTPATIENT)
Dept: AUDIOLOGY | Facility: CLINIC | Age: 45
End: 2025-06-20
Payer: COMMERCIAL

## 2025-06-20 ENCOUNTER — TELEPHONE (OUTPATIENT)
Dept: OTOLARYNGOLOGY | Facility: CLINIC | Age: 45
End: 2025-06-20
Payer: COMMERCIAL

## 2025-06-20 ENCOUNTER — DOCUMENTATION ONLY (OUTPATIENT)
Dept: AUDIOLOGY | Facility: CLINIC | Age: 45
End: 2025-06-20
Payer: COMMERCIAL

## 2025-06-20 NOTE — TELEPHONE ENCOUNTER
Copied from CRM #[id number]. Topic: General Inquiry - Patient Advice  >> Jun 20, 2025 11:39 AM Petty wrote:  Type:  Needs Medical Advice    Who Called: Duane Romero        Symptoms (please be specific): need to talk to the nurse about getting an referral for her hearing aide   How long has patient had these symptoms:    Pharmacy name and phone #:  \  Would the patient rather a call back or a response via [facility]? Call back  Best Call Back Number: [phone]  Additional Information: n   [phone]

## 2025-06-20 NOTE — TELEPHONE ENCOUNTER
Spoke to pt who states she needs her hearing aides worked on and would like a referral to Dr. Lora's office. Informed I will call once done. Voiced understanding.

## 2025-06-20 NOTE — TELEPHONE ENCOUNTER
She is having issues with her 2 right [employer] processors. One is experiencing high battery drain and the other will turn on for seconds then shut off. She will call [facility] Monday to determine cost of repair and will plan to send off one at a time. She will let me know what she finds out.

## 2025-06-24 ENCOUNTER — TELEPHONE (OUTPATIENT)
Dept: OTOLARYNGOLOGY | Facility: CLINIC | Age: 45
End: 2025-06-24
Payer: COMMERCIAL

## 2025-06-24 NOTE — TELEPHONE ENCOUNTER
Spoke to Grisel Houston re hearing aides. States she has spoken to pt and has informed that she is unable to go to [facility] to have them fixed due to her ins. Voiced understanding.

## 2025-06-26 ENCOUNTER — TELEPHONE (OUTPATIENT)
Dept: ORTHOPEDICS | Facility: CLINIC | Age: 45
End: 2025-06-26
Payer: COMMERCIAL

## 2025-06-26 NOTE — TELEPHONE ENCOUNTER
Spoke to the patient an she stated that she's only feeling numbness and a little pain an I told her that it is noraml to still feel some pain but that we would definitely get her in to see Dr. Reinoso to get her checked out the patient declined an stated that she isn't hurting that bad. I explained to her that if she feels that it gets worse to give us a call an we can get her in the office really soon an that Dr. Reinoso is in the office in [place] on Tuesday virk Wednesdays.         Copied from CRM #[id number]. Topic: General Inquiry - Patient Advice  >> Jun 25, 2025  2:43 PM Jonel wrote:  .Type: Patient Call Back        Who called:   patient      What is the request in detail:  called in concerning recently having surgery done on her hand for carpal tunnel . Patient states the pain in her thumb has gotten worse since surgery . Please call back     Can the clinic reply by [facility]?           Would the patient rather a call back or a response via [facility]?call         Best call back number:  [phone]

## 2025-08-13 ENCOUNTER — OFFICE VISIT (OUTPATIENT)
Dept: ORTHOPEDICS | Facility: CLINIC | Age: 45
End: 2025-08-13
Payer: COMMERCIAL

## 2025-08-13 VITALS — WEIGHT: 156.06 LBS | BODY MASS INDEX: 26.64 KG/M2 | HEIGHT: 64 IN

## 2025-08-13 DIAGNOSIS — M77.11 LATERAL EPICONDYLITIS OF BOTH ELBOWS: ICD-10-CM

## 2025-08-13 DIAGNOSIS — M77.12 LATERAL EPICONDYLITIS OF BOTH ELBOWS: ICD-10-CM

## 2025-08-13 DIAGNOSIS — M18.12 ARTHRITIS OF CARPOMETACARPAL (CMC) JOINT OF LEFT THUMB: Primary | ICD-10-CM

## 2025-08-13 PROCEDURE — 99999 PR PBB SHADOW E&M-EST. PATIENT-LVL II: CPT | Mod: PBBFAC,,, | Performed by: ORTHOPAEDIC SURGERY

## 2025-08-29 ENCOUNTER — OFFICE VISIT (OUTPATIENT)
Dept: URGENT CARE | Facility: CLINIC | Age: 45
End: 2025-08-29
Payer: COMMERCIAL

## 2025-08-29 VITALS
SYSTOLIC BLOOD PRESSURE: 126 MMHG | TEMPERATURE: 98 F | OXYGEN SATURATION: 99 % | BODY MASS INDEX: 26.08 KG/M2 | HEIGHT: 64 IN | RESPIRATION RATE: 20 BRPM | DIASTOLIC BLOOD PRESSURE: 84 MMHG | WEIGHT: 152.75 LBS | HEART RATE: 73 BPM

## 2025-08-29 DIAGNOSIS — J30.9 ALLERGIC RHINITIS, UNSPECIFIED SEASONALITY, UNSPECIFIED TRIGGER: Primary | ICD-10-CM

## 2025-08-29 RX ORDER — AZELASTINE 1 MG/ML
1 SPRAY, METERED NASAL 2 TIMES DAILY PRN
Qty: 30 ML | Refills: 0 | Status: SHIPPED | OUTPATIENT
Start: 2025-08-29 | End: 2025-09-28

## 2025-08-29 RX ORDER — EPINEPHRINE 0.3 MG/.3ML
0.3 INJECTION SUBCUTANEOUS DAILY PRN
COMMUNITY
Start: 2025-06-06

## 2025-08-29 RX ORDER — LEVOCETIRIZINE DIHYDROCHLORIDE 5 MG/1
5 TABLET, FILM COATED ORAL NIGHTLY PRN
Qty: 30 TABLET | Refills: 0 | Status: SHIPPED | OUTPATIENT
Start: 2025-08-29 | End: 2025-09-28

## 2025-08-29 RX ORDER — FLUTICASONE PROPIONATE 50 MCG
1 SPRAY, SUSPENSION (ML) NASAL 2 TIMES DAILY PRN
Qty: 16 G | Refills: 0 | Status: SHIPPED | OUTPATIENT
Start: 2025-08-29 | End: 2025-09-28

## 2025-08-31 ENCOUNTER — TELEPHONE (OUTPATIENT)
Dept: URGENT CARE | Facility: CLINIC | Age: 45
End: 2025-08-31
Payer: COMMERCIAL

## (undated) DEVICE — TOURNIQUET SB QC DP 18X4IN

## (undated) DEVICE — SUT BONE WAX 2.5 GRMS 12/BX

## (undated) DEVICE — DRAPE THREE-QTR REINF 53X77IN

## (undated) DEVICE — BANDAGE ELASTIC 3X5 VELCRO ST

## (undated) DEVICE — SEE MEDLINE ITEM 157128

## (undated) DEVICE — NDL PNEUMO INSUFFLATI 120MM

## (undated) DEVICE — PUNCH BIOPSY 4MM STERILE DISPO

## (undated) DEVICE — DRESSING XEROFORM FOIL PK 1X8

## (undated) DEVICE — GLOVE BIOGEL SZ 8 1/2

## (undated) DEVICE — BLADE SURG #15 CARBON STEEL

## (undated) DEVICE — SYR 10CC LUER LOCK

## (undated) DEVICE — SEE MEDLINE ITEM 157117

## (undated) DEVICE — COVER LIGHT HANDLE 80/CA

## (undated) DEVICE — GOWN NONREINF SET-IN SLV 2XL

## (undated) DEVICE — PAD ABDOMINAL STERILE 8X10IN

## (undated) DEVICE — DRAPE U SPLIT SHEET 54X76IN

## (undated) DEVICE — GLOVE SURGICAL LATEX SZ 7

## (undated) DEVICE — SOL 9P NACL IRR PIC IL

## (undated) DEVICE — KIT EAR EAOFE

## (undated) DEVICE — SUT VICRYL 3-0 27 SH

## (undated) DEVICE — ELECTRODE NEEDLE 2.8IN

## (undated) DEVICE — GAUZE SPONGE 4X4 12PLY

## (undated) DEVICE — SYR SLIP TIP 1CC

## (undated) DEVICE — TOWEL OR DISP STRL BLUE 4/PK

## (undated) DEVICE — CLOSURE SKIN STERI STRIP 1/2X4

## (undated) DEVICE — SUCTION SURGICAL STR 7FR

## (undated) DEVICE — ALCOHOL 70% ANTISEPTIC ISO 4OZ

## (undated) DEVICE — DRAPE HAND STERILE

## (undated) DEVICE — PUNCH BIOPSY 6MM DERMAL DISP

## (undated) DEVICE — CORD BIPOLAR 12 FOOT

## (undated) DEVICE — SYR 3CC LUER LOC

## (undated) DEVICE — BIT DRILL COUNTERSINK 4MM

## (undated) DEVICE — COVER CAMERA OPERATING ROOM

## (undated) DEVICE — SUCTION SURGICAL FRAZR

## (undated) DEVICE — SCRUB DYNA-HEX LIQ 4% CHG 4OZ

## (undated) DEVICE — PACK BASIC SETUP SC BR

## (undated) DEVICE — PAD CAST SPECIALIST STRL 3

## (undated) DEVICE — NDL HYPO REG 25G X 1 1/2

## (undated) DEVICE — SPONGE COTTON TRAY 4X4IN

## (undated) DEVICE — SHEARS HARMONIC CRVD 9 CM

## (undated) DEVICE — ELECTRODE REM PLYHSV RETURN 9

## (undated) DEVICE — SUT 4-0 ETHILON 18 PS-2

## (undated) DEVICE — KIT DRESS GLASSCK EAR ADLT ST

## (undated) DEVICE — SUT PROLENE 0 MO6 30IN BLUE

## (undated) DEVICE — BANDAGE ESMARK ELASTIC ST 4X9

## (undated) DEVICE — NDL ECLIPSE SAF REG 25GX1.5IN

## (undated) DEVICE — Device

## (undated) DEVICE — GUIDE DRILL CONICAL TIP F/3

## (undated) DEVICE — MARKER SKIN STND TIP BLUE BARR

## (undated) DEVICE — UNDERGLOVES BIOGEL PI SZ 7 LF

## (undated) DEVICE — SEE MEDLINE ITEM 157148

## (undated) DEVICE — SUT 3-0 VICRYL / SH (J416)

## (undated) DEVICE — MANIFOLD 4 PORT

## (undated) DEVICE — NDL SAFETY 25G X 1.5 ECLIPSE

## (undated) DEVICE — UNDERGLOVES BIOGEL PI SIZE 8.5

## (undated) DEVICE — ADHESIVE MASTISOL VIAL 48/BX

## (undated) DEVICE — DRESSING XEROFORM NONADH 1X8IN

## (undated) DEVICE — SEE MEDLINE ITEM 157027

## (undated) DEVICE — SUT MONOCRYL 4.0 PS2 CP496G

## (undated) DEVICE — SOL NACL IRR 1000ML BTL

## (undated) DEVICE — DRESSING TRANS 4X4 TEGADERM

## (undated) DEVICE — HEADREST ROUND DISP FOAM 9IN

## (undated) DEVICE — APPLICATOR CHLORAPREP ORN 26ML